# Patient Record
Sex: MALE | Race: WHITE | Employment: UNEMPLOYED | URBAN - METROPOLITAN AREA
[De-identification: names, ages, dates, MRNs, and addresses within clinical notes are randomized per-mention and may not be internally consistent; named-entity substitution may affect disease eponyms.]

---

## 2017-01-01 ENCOUNTER — OFFICE VISIT (OUTPATIENT)
Dept: FAMILY MEDICINE | Facility: CLINIC | Age: 67
End: 2017-01-01
Payer: COMMERCIAL

## 2017-01-01 ENCOUNTER — TELEPHONE (OUTPATIENT)
Dept: FAMILY MEDICINE | Facility: CLINIC | Age: 67
End: 2017-01-01

## 2017-01-01 ENCOUNTER — OFFICE VISIT (OUTPATIENT)
Dept: FAMILY MEDICINE | Facility: CLINIC | Age: 67
End: 2017-01-01
Payer: MEDICARE

## 2017-01-01 ENCOUNTER — OFFICE VISIT (OUTPATIENT)
Dept: FAMILY MEDICINE | Facility: CLINIC | Age: 67
End: 2017-01-01

## 2017-01-01 VITALS
SYSTOLIC BLOOD PRESSURE: 100 MMHG | OXYGEN SATURATION: 95 % | BODY MASS INDEX: 22.96 KG/M2 | RESPIRATION RATE: 16 BRPM | DIASTOLIC BLOOD PRESSURE: 60 MMHG | TEMPERATURE: 98.1 F | WEIGHT: 160 LBS | HEART RATE: 67 BPM

## 2017-01-01 VITALS
TEMPERATURE: 97.3 F | RESPIRATION RATE: 20 BRPM | SYSTOLIC BLOOD PRESSURE: 112 MMHG | DIASTOLIC BLOOD PRESSURE: 66 MMHG | WEIGHT: 162 LBS | OXYGEN SATURATION: 94 % | BODY MASS INDEX: 23.24 KG/M2 | HEART RATE: 63 BPM

## 2017-01-01 VITALS
TEMPERATURE: 97.8 F | WEIGHT: 160 LBS | RESPIRATION RATE: 18 BRPM | SYSTOLIC BLOOD PRESSURE: 112 MMHG | BODY MASS INDEX: 22.9 KG/M2 | HEIGHT: 70 IN | OXYGEN SATURATION: 90 % | HEART RATE: 76 BPM | DIASTOLIC BLOOD PRESSURE: 74 MMHG

## 2017-01-01 VITALS
RESPIRATION RATE: 14 BRPM | TEMPERATURE: 97.4 F | DIASTOLIC BLOOD PRESSURE: 70 MMHG | BODY MASS INDEX: 23.34 KG/M2 | HEIGHT: 70 IN | OXYGEN SATURATION: 98 % | WEIGHT: 163 LBS | HEART RATE: 68 BPM | SYSTOLIC BLOOD PRESSURE: 110 MMHG

## 2017-01-01 VITALS
WEIGHT: 162 LBS | SYSTOLIC BLOOD PRESSURE: 132 MMHG | DIASTOLIC BLOOD PRESSURE: 86 MMHG | RESPIRATION RATE: 16 BRPM | BODY MASS INDEX: 23.24 KG/M2 | OXYGEN SATURATION: 93 % | HEART RATE: 74 BPM | TEMPERATURE: 98.4 F

## 2017-01-01 DIAGNOSIS — Z12.11 SCREEN FOR COLON CANCER: ICD-10-CM

## 2017-01-01 DIAGNOSIS — G47.00 INSOMNIA, UNSPECIFIED TYPE: ICD-10-CM

## 2017-01-01 DIAGNOSIS — R06.2 WHEEZING: ICD-10-CM

## 2017-01-01 DIAGNOSIS — M54.40 CHRONIC MIDLINE LOW BACK PAIN WITH SCIATICA, SCIATICA LATERALITY UNSPECIFIED: ICD-10-CM

## 2017-01-01 DIAGNOSIS — G89.29 CHRONIC BILATERAL LOW BACK PAIN WITH LEFT-SIDED SCIATICA: ICD-10-CM

## 2017-01-01 DIAGNOSIS — F33.1 MODERATE EPISODE OF RECURRENT MAJOR DEPRESSIVE DISORDER (H): ICD-10-CM

## 2017-01-01 DIAGNOSIS — G89.4 CHRONIC PAIN SYNDROME: Primary | ICD-10-CM

## 2017-01-01 DIAGNOSIS — M54.50 CHRONIC MIDLINE LOW BACK PAIN WITHOUT SCIATICA: ICD-10-CM

## 2017-01-01 DIAGNOSIS — J44.1 OBSTRUCTIVE CHRONIC BRONCHITIS WITH EXACERBATION (H): ICD-10-CM

## 2017-01-01 DIAGNOSIS — G47.09 OTHER INSOMNIA: Primary | ICD-10-CM

## 2017-01-01 DIAGNOSIS — G89.29 CHRONIC BILATERAL LOW BACK PAIN WITH LEFT-SIDED SCIATICA: Primary | ICD-10-CM

## 2017-01-01 DIAGNOSIS — Z53.9 ERRONEOUS ENCOUNTER--DISREGARD: Primary | ICD-10-CM

## 2017-01-01 DIAGNOSIS — G89.29 CHRONIC MIDLINE LOW BACK PAIN WITH SCIATICA, SCIATICA LATERALITY UNSPECIFIED: ICD-10-CM

## 2017-01-01 DIAGNOSIS — Z23 NEED FOR VACCINATION: ICD-10-CM

## 2017-01-01 DIAGNOSIS — G89.29 CHRONIC MIDLINE LOW BACK PAIN WITHOUT SCIATICA: Primary | ICD-10-CM

## 2017-01-01 DIAGNOSIS — G89.29 CHRONIC MIDLINE LOW BACK PAIN WITHOUT SCIATICA: ICD-10-CM

## 2017-01-01 DIAGNOSIS — R05.9 COUGH: ICD-10-CM

## 2017-01-01 DIAGNOSIS — M54.42 CHRONIC BILATERAL LOW BACK PAIN WITH LEFT-SIDED SCIATICA: ICD-10-CM

## 2017-01-01 DIAGNOSIS — J41.0 SIMPLE CHRONIC BRONCHITIS (H): ICD-10-CM

## 2017-01-01 DIAGNOSIS — F17.200 TOBACCO USE DISORDER: ICD-10-CM

## 2017-01-01 DIAGNOSIS — Z12.11 SCREEN FOR COLON CANCER: Primary | ICD-10-CM

## 2017-01-01 DIAGNOSIS — Z12.11 ENCOUNTER FOR SCREENING FOR MALIGNANT NEOPLASM OF COLON: ICD-10-CM

## 2017-01-01 DIAGNOSIS — Z23 NEED FOR PROPHYLACTIC VACCINATION AND INOCULATION AGAINST INFLUENZA: ICD-10-CM

## 2017-01-01 DIAGNOSIS — E78.5 HYPERLIPIDEMIA LDL GOAL <100: ICD-10-CM

## 2017-01-01 DIAGNOSIS — G89.4 CHRONIC PAIN SYNDROME: ICD-10-CM

## 2017-01-01 DIAGNOSIS — Z23 NEED FOR PROPHYLACTIC VACCINATION AGAINST STREPTOCOCCUS PNEUMONIAE (PNEUMOCOCCUS): ICD-10-CM

## 2017-01-01 DIAGNOSIS — M54.42 CHRONIC BILATERAL LOW BACK PAIN WITH LEFT-SIDED SCIATICA: Primary | ICD-10-CM

## 2017-01-01 DIAGNOSIS — M51.06 INTERVERTEBRAL LUMBAR DISC DISORDER WITH MYELOPATHY, LUMBAR REGION: ICD-10-CM

## 2017-01-01 DIAGNOSIS — M54.50 CHRONIC MIDLINE LOW BACK PAIN WITHOUT SCIATICA: Primary | ICD-10-CM

## 2017-01-01 LAB
GABAPENTIN SERPLBLD-MCNC: 7.8 UG/ML
PAIN DRUG SCR UR W RPTD MEDS: NORMAL

## 2017-01-01 PROCEDURE — 99213 OFFICE O/P EST LOW 20 MIN: CPT | Mod: 25 | Performed by: FAMILY MEDICINE

## 2017-01-01 PROCEDURE — 99214 OFFICE O/P EST MOD 30 MIN: CPT | Performed by: FAMILY MEDICINE

## 2017-01-01 PROCEDURE — 80307 DRUG TEST PRSMV CHEM ANLYZR: CPT | Mod: 90 | Performed by: FAMILY MEDICINE

## 2017-01-01 PROCEDURE — 90715 TDAP VACCINE 7 YRS/> IM: CPT | Performed by: FAMILY MEDICINE

## 2017-01-01 PROCEDURE — 90670 PCV13 VACCINE IM: CPT | Performed by: FAMILY MEDICINE

## 2017-01-01 PROCEDURE — 80171 DRUG SCREEN QUANT GABAPENTIN: CPT | Mod: 90 | Performed by: FAMILY MEDICINE

## 2017-01-01 PROCEDURE — 99000 SPECIMEN HANDLING OFFICE-LAB: CPT | Performed by: FAMILY MEDICINE

## 2017-01-01 PROCEDURE — 99214 OFFICE O/P EST MOD 30 MIN: CPT | Performed by: INTERNAL MEDICINE

## 2017-01-01 PROCEDURE — G0009 ADMIN PNEUMOCOCCAL VACCINE: HCPCS | Performed by: FAMILY MEDICINE

## 2017-01-01 PROCEDURE — 36415 COLL VENOUS BLD VENIPUNCTURE: CPT | Performed by: FAMILY MEDICINE

## 2017-01-01 PROCEDURE — 90472 IMMUNIZATION ADMIN EACH ADD: CPT | Performed by: FAMILY MEDICINE

## 2017-01-01 RX ORDER — ALBUTEROL SULFATE 90 UG/1
2 AEROSOL, METERED RESPIRATORY (INHALATION) EVERY 6 HOURS
Qty: 1 INHALER | Refills: 5 | Status: SHIPPED | OUTPATIENT
Start: 2017-01-01 | End: 2017-01-01

## 2017-01-01 RX ORDER — CYCLOBENZAPRINE HCL 10 MG
10 TABLET ORAL 3 TIMES DAILY PRN
Qty: 90 TABLET | Refills: 5 | Status: ON HOLD | OUTPATIENT
Start: 2017-01-01 | End: 2018-01-01

## 2017-01-01 RX ORDER — DOXEPIN HYDROCHLORIDE 25 MG/1
25 CAPSULE ORAL AT BEDTIME
Qty: 30 CAPSULE | Refills: 1 | Status: ON HOLD | OUTPATIENT
Start: 2017-01-01 | End: 2018-01-01

## 2017-01-01 RX ORDER — AZITHROMYCIN 500 MG/1
500 TABLET, FILM COATED ORAL DAILY
Qty: 3 TABLET | Refills: 0 | Status: ON HOLD | OUTPATIENT
Start: 2017-01-01 | End: 2018-01-01

## 2017-01-01 RX ORDER — ALBUTEROL SULFATE 90 UG/1
2 AEROSOL, METERED RESPIRATORY (INHALATION) EVERY 6 HOURS
Qty: 1 INHALER | Refills: 5 | Status: SHIPPED | OUTPATIENT
Start: 2017-01-01

## 2017-01-01 RX ORDER — CYCLOBENZAPRINE HCL 10 MG
10 TABLET ORAL 3 TIMES DAILY PRN
Qty: 90 TABLET | Refills: 5 | Status: SHIPPED | OUTPATIENT
Start: 2017-01-01 | End: 2017-01-01

## 2017-01-01 RX ORDER — GABAPENTIN 600 MG/1
TABLET ORAL
Qty: 180 TABLET | Refills: 0 | Status: SHIPPED | OUTPATIENT
Start: 2017-01-01 | End: 2017-01-01

## 2017-01-01 RX ORDER — GABAPENTIN 600 MG/1
TABLET ORAL
Qty: 180 TABLET | Refills: 0 | OUTPATIENT
Start: 2017-01-01

## 2017-01-01 RX ORDER — GABAPENTIN 600 MG/1
TABLET ORAL
Qty: 180 TABLET | Refills: 2 | Status: ON HOLD | OUTPATIENT
Start: 2017-01-01 | End: 2018-01-01

## 2017-01-01 RX ORDER — ZOLPIDEM TARTRATE 5 MG/1
5 TABLET ORAL
Qty: 30 TABLET | Refills: 5 | Status: ON HOLD | OUTPATIENT
Start: 2017-01-01 | End: 2018-01-01

## 2017-01-01 RX ORDER — GABAPENTIN 600 MG/1
TABLET ORAL
Qty: 180 TABLET | Refills: 2 | Status: SHIPPED | OUTPATIENT
Start: 2017-01-01 | End: 2017-01-01

## 2017-01-01 RX ORDER — DULOXETIN HYDROCHLORIDE 30 MG/1
30 CAPSULE, DELAYED RELEASE ORAL 2 TIMES DAILY
Qty: 60 CAPSULE | Refills: 5 | Status: ON HOLD | OUTPATIENT
Start: 2017-01-01 | End: 2018-01-01

## 2017-01-01 ASSESSMENT — PATIENT HEALTH QUESTIONNAIRE - PHQ9
SUM OF ALL RESPONSES TO PHQ QUESTIONS 1-9: 16
SUM OF ALL RESPONSES TO PHQ QUESTIONS 1-9: 10
5. POOR APPETITE OR OVEREATING: SEVERAL DAYS
10. IF YOU CHECKED OFF ANY PROBLEMS, HOW DIFFICULT HAVE THESE PROBLEMS MADE IT FOR YOU TO DO YOUR WORK, TAKE CARE OF THINGS AT HOME, OR GET ALONG WITH OTHER PEOPLE: VERY DIFFICULT
SUM OF ALL RESPONSES TO PHQ QUESTIONS 1-9: 16

## 2017-01-01 ASSESSMENT — ANXIETY QUESTIONNAIRES
6. BECOMING EASILY ANNOYED OR IRRITABLE: SEVERAL DAYS
2. NOT BEING ABLE TO STOP OR CONTROL WORRYING: SEVERAL DAYS
7. FEELING AFRAID AS IF SOMETHING AWFUL MIGHT HAPPEN: SEVERAL DAYS
1. FEELING NERVOUS, ANXIOUS, OR ON EDGE: SEVERAL DAYS
GAD7 TOTAL SCORE: 7
3. WORRYING TOO MUCH ABOUT DIFFERENT THINGS: SEVERAL DAYS
5. BEING SO RESTLESS THAT IT IS HARD TO SIT STILL: SEVERAL DAYS
IF YOU CHECKED OFF ANY PROBLEMS ON THIS QUESTIONNAIRE, HOW DIFFICULT HAVE THESE PROBLEMS MADE IT FOR YOU TO DO YOUR WORK, TAKE CARE OF THINGS AT HOME, OR GET ALONG WITH OTHER PEOPLE: SOMEWHAT DIFFICULT
GAD7 TOTAL SCORE: 7

## 2017-02-22 NOTE — MR AVS SNAPSHOT
After Visit Summary   2/22/2017    Joni Muñoz    MRN: [id number]           Patient Information     Date Of Birth          1950        Visit Information        Provider Department      2/22/2017 7:30 AM Mayur Quinones MD [facility]        Today's Diagnoses     Chronic bilateral low back pain with left-sided sciatica    -  1    Moderate episode of recurrent major depressive disorder (H)        Wheezing        Cough        Tobacco use disorder        Chronic pain syndrome          Care Instructions    Today we added duloxetine (cymbalata) 30 mg twice per day as an antidepressant. This can also help with pain relief.                                                 You should schedule a yearly physical exam with Dr Gonzalez or with me.         Follow-ups after your visit        Additional Services     PHYSICAL THERAPY REFERRAL       Treatment: Evaluation & Treatment  Special Instructions/Modalities: no  Special Programs: low back program    Please be aware that coverage of these services is subject to the terms and limitations of your health insurance plan.  Call member services at your health plan with any benefit or coverage questions.      **Note to Provider:  If you are referring outside of [place] for the therapy appointment, please list the name of the location in the  special instructions  above, print the referral and give to the patient to schedule the appointment.                  Who to contact     If you have questions or need follow up information about today's clinic visit or your schedule please contact [facility] directly at [phone].  Normal or non-critical lab and imaging results will be communicated to you by MyChart, letter or phone within 4 business days after the clinic has received the results. If you do not hear from us within 7 days, please contact the clinic through MyChart or phone. If you have a critical or  "abnormal lab result, we will notify you by phone as soon as possible.  Submit refill requests through [employer] or call your pharmacy and they will forward the refill request to us. Please allow 3 business days for your refill to be completed.          Additional Information About Your Visit        [employer] Information     [employer] lets you send messages to your doctor, view your test results, renew your prescriptions, schedule appointments and more. To sign up, go to [email or web address] . Click on \"Log in\" on the left side of the screen, which will take you to the Welcome page. Then click on \"Sign up Now\" on the right side of the page.     You will be asked to enter the access code listed below, as well as some personal information. Please follow the directions to create your username and password.     Your access code is: [email or web address]  Expires: 3/8/2017 12:01 PM     Your access code will  in 90 days. If you need help or a new code, please call your [place] clinic or [phone].        Care EveryWhere ID     This is your Care EveryWhere ID. This could be used by other organizations to access your [facility] medical records  [id number]        Your Vitals Were     Pulse Temperature Respirations Height Pulse Oximetry BMI (Body Mass Index)    76 97.8  F (36.6  C) 18 5' 10\" (1.778 m) 90% 22.96 kg/m2       Blood Pressure from Last 3 Encounters:   17 112/74   16 130/86   16 116/70    Weight from Last 3 Encounters:   17 160 lb (72.6 kg)   16 167 lb (75.8 kg)   16 167 lb (75.8 kg)              We Performed the Following     PHYSICAL THERAPY REFERRAL          Today's Medication Changes          These changes are accurate as of: 17  8:03 AM.  If you have any questions, ask your nurse or doctor.               Start taking these medicines.        Dose/Directions    DULoxetine 30 MG EC capsule   Commonly known as:  CYMBALTA   Used for:  Moderate episode of recurrent major " depressive disorder (H)   Started by:  Mayur Quinones MD        Dose:  30 mg   Take 1 capsule (30 mg) by mouth 2 times daily   Quantity:  60 capsule   Refills:  5            Where to get your medicines      These medications were sent to [facility] - [place], [place] - [address] AT [address] [place] [address] & [facility]  [address] [place] [address] [address]    Hours:  24-hours Phone:  [phone]     albuterol 108 (90 BASE) MCG/ACT Inhaler    beclomethasone 80 MCG/ACT Inhaler    cyclobenzaprine 10 MG tablet    DULoxetine 30 MG EC capsule                Primary Care Provider Office Phone # Fax #    Kodi Gonzalez [phone]       [facility] [address] [facility] [address]        Thank you!     Thank you for choosing [facility]  for your care. Our goal is always to provide you with excellent care. Hearing back from our patients is one way we can continue to improve our services. Please take a few minutes to complete the written survey that you may receive in the mail after your visit with us. Thank you!             Your Updated Medication List - Protect others around you: Learn how to safely use, store and throw away your medicines at www.disposemymeds.org.          This list is accurate as of: 2/22/17  8:03 AM.  Always use your most recent med list.                   Brand Name Dispense Instructions for use    albuterol 108 (90 BASE) MCG/ACT Inhaler    albuterol    1 Inhaler    Inhale 2 puffs into the lungs every 6 hours       beclomethasone 80 MCG/ACT Inhaler    QVAR    1 Inhaler    Inhale 2 puffs into the lungs 2 times daily       cyclobenzaprine 10 MG tablet    FLEXERIL    90 tablet    Take 1 tablet (10 mg) by mouth 3 times daily as needed for muscle spasms       DULoxetine 30 MG EC capsule    CYMBALTA    60 capsule    Take 1 capsule (30 mg) by mouth 2 times daily       gabapentin 600 MG tablet    NEURONTIN    180 tablet    2 tabs  three times a day

## 2017-02-22 NOTE — PATIENT INSTRUCTIONS
Today we added duloxetine (cymbalata) 30 mg twice per day as an antidepressant. This can also help with pain relief.                                                 You should schedule a yearly physical exam with Dr Gonzalez or with me.

## 2017-02-22 NOTE — NURSING NOTE
"Chief Complaint   Patient presents with     Referral       Initial /74  Pulse 76  Temp 97.8  F (36.6  C)  Resp 18  Ht 5' 10\" (1.778 m)  Wt 160 lb (72.6 kg)  SpO2 90%  BMI 22.96 kg/m2 Estimated body mass index is 22.96 kg/(m^2) as calculated from the following:    Height as of this encounter: 5' 10\" (1.778 m).    Weight as of this encounter: 160 lb (72.6 kg).  Medication Reconciliation: harpreet Platt CMA      "

## 2017-02-22 NOTE — PROGRESS NOTES
"  SUBJECTIVE:                                                    Joni Muñoz is a 66 year old male who presents to clinic today for the following health issues:    Health Maintenance Due   Topic Date Due     LATRICIA QUESTIONNAIRE 1 YEAR  1950     ADVANCE DIRECTIVE PLANNING Q5 YRS (NO INBASKET)  11/06/1969     URINE DRUG SCREEN Q1 YR  07/02/2014     FIT Q1 YR (NO INBASKET)  04/22/2015     INFLUENZA VACCINE (SYSTEM ASSIGNED)  09/01/2016     PNEUMOCOCCAL (1 of 2 - PCV13) 11/06/2016     AORTIC ANEURYSM SCREENING (SYSTEM ASSIGNED)  11/06/2016     PHQ-9 Q6 MONTHS (NO INBASKET)  03/06/2017     Health Maintenance reviewed at today's visit patient asked to schedule/complete:   Colon Cancer:  Patient agrees to schedule  chronic pain        Referral      Duration:     Description (location/character/radiation): pt needs a referral for PT and requesting a MRI if it is time to have one again    Intensity:      Accompanying signs and symptoms:     History (similar episodes/previous evaluation): None    Precipitating or alleviating factors: None    Therapies tried and outcome: None                      Wants PT; he knows where he wants to go, but does not know the name of the clinic.                     Goes to a methadone clinic; he wants to find another pain clinic.        Wants an MRI of his lumber spine; another one; \"they usually want one at a pain clinic\".                          He lives with his sister; she is going home from a NH soon; she needs lots of care.                                                                                                                                   He reports no leg weakness; but has low back and L leg pain.      He reports no real major change in his symptoms since his last MRI in December 2015.                                                                                                   His medication list needs to be updated. He is no longer taking Lexapro. He reports " "it did not help as an antidepressant. He is no longer taking Wellbutrin. He reports having an adverse effect when he took that. He is no longer taking Suboxone. He attends a methadone clinic. He wants refills of his albuterol and Qvar inhalers. He still smokes.        Problem list and histories reviewed & adjusted, as indicated.  Additional history: as documented    Current Outpatient Prescriptions   Medication Sig Dispense Refill     cyclobenzaprine (FLEXERIL) 10 MG tablet Take 1 tablet (10 mg) by mouth 3 times daily as needed for muscle spasms 90 tablet 5     gabapentin (NEURONTIN) 600 MG tablet 2 tabs three times a day 180 tablet 5     albuterol (ALBUTEROL) 108 (90 BASE) MCG/ACT inhaler Inhale 2 puffs into the lungs every 6 hours 1 Inhaler 5     beclomethasone (QVAR) 80 MCG/ACT Inhaler Inhale 2 puffs into the lungs 2 times daily 1 Inhaler 1     No Known Allergies  BP Readings from Last 3 Encounters:   12/14/16 130/86   12/08/16 116/70   09/06/16 118/70    Wt Readings from Last 3 Encounters:   02/22/17 160 lb (72.6 kg)   12/14/16 167 lb (75.8 kg)   12/08/16 167 lb (75.8 kg)                  Problem list, Medication list, Allergies, and Medical/Social/Surgical histories reviewed in EPIC and updated as appropriate.    ROS:  CONSTITUTIONAL:NEGATIVE for fever, chills, change in weight  NEURO: POSITIVE for numbness or tingling  and radicular pain left leg    OBJECTIVE:                                                    Ht 5' 10\" (1.778 m)  Wt 160 lb (72.6 kg)  BMI 22.96 kg/m2  Body mass index is 22.96 kg/(m^2).  GENERAL APPEARANCE: healthy, alert, mild distress and he needs to stand up occasionally to relieve pain  MS: decreased range of motion lumbar spine  NEURO: Normal strength and tone    Diagnostic test results:  none      ASSESSMENT/PLAN:                                                        ICD-10-CM    1. Chronic bilateral low back pain with left-sided sciatica M54.42 cyclobenzaprine (FLEXERIL) 10 MG tablet    " G89.29 PHYSICAL THERAPY REFERRAL   2. Moderate episode of recurrent major depressive disorder (H) F33.1 DULoxetine (CYMBALTA) 30 MG EC capsule   3. Wheezing R06.2 albuterol (ALBUTEROL) 108 (90 BASE) MCG/ACT Inhaler   4. Cough R05 beclomethasone (QVAR) 80 MCG/ACT Inhaler    antibiotic/ cough syrup / inhalers    5. Tobacco use disorder F17.200    6. Chronic pain syndrome G89.4        We reviewed several issues today. He wants to have another course of physical therapy and I gave him an order for that.            For now he will continue with the methadone clinic.            He reports planning to look for another pain clinic. I renewed his albuterol and Qvar.             Patient Instructions   Today we added duloxetine (cymbalata) 30 mg twice per day as an antidepressant. This can also help with pain relief.                                                 You should schedule a yearly physical exam with Dr Gonzalez or with me.       Mayur Quinones MD  [facility]

## 2017-05-22 NOTE — TELEPHONE ENCOUNTER
Controlled Substance Refill Request for Gabapentin  Problem List Complete:  No     PROVIDER TO CONSIDER COMPLETION OF PROBLEM LIST AND OVERVIEW/CONTROLLED SUBSTANCE AGREEMENT    Last Written Prescription Date:  12-8-16  Last Fill Quantity: 180,   # refills: 5    Last Office Visit with [facility] primary care provider: 2-22-17    Future Office visit:     Controlled substance agreement on file: No.     Processing:  Fax Rx to [facility] pharmacy   checked in past 6 months?  Yes 5-22-17 with no concerns

## 2017-05-23 NOTE — TELEPHONE ENCOUNTER
Gabapentin 600 mg      Last Written Prescription Date:  5/23/17  Last Fill Quantity: 180,   # refills: 0  Last Office Visit with [facility] prescribing provider: 2/22/17  Future Office visit:       Routing refill request to provider for review/approval because:  Drug not on the [facility] refill protocol or controlled substance

## 2017-05-24 NOTE — TELEPHONE ENCOUNTER
Controlled Substance Refill Request for Gabapentin  Problem List Complete:  No     PROVIDER TO CONSIDER COMPLETION OF PROBLEM LIST AND OVERVIEW/CONTROLLED SUBSTANCE AGREEMENT    Controlled substance agreement on file: No.     Processing:  Fax Rx to [facility] pharmacy   checked in past 6 months?  Yes 5-22-17

## 2017-06-28 NOTE — PROGRESS NOTES
SUBJECTIVE:                                                    Joni Muñoz is a 66 year old male who presents to clinic today for the following health issues:      Medication Followup of Gabapentin and flexeril    Taking Medication as prescribed: yes    Side Effects:  None    Medication Helping Symptoms:  yes     Here fro refill on medications he is going to methadone lcinic on [address] in [facility]. States now out of gabapentin and flexeril.   PROBLEMS TO ADD ON...    Problem list and histories reviewed & adjusted, as indicated.  Additional history: as documented    Patient Active Problem List   Diagnosis     Sprain of great toe     Chronic pain syndrome     Disorder of bone and cartilage     Moderate episode of recurrent major depressive disorder (H)     Tobacco use disorder     Backache     Degeneration of lumbar or lumbosacral intervertebral disc     Intervertebral lumbar disc disorder with myelopathy, lumbar region     Facet arthritis of lumbar region (H)     Chronic low back pain     Sacroiliac dysfunction     Lumbago     Hyperlipidemia LDL goal <130     Cocaine abuse     Shoulder pain, right     Past Surgical History:   Procedure Laterality Date     BACK SURGERY       HERNIA REPAIR  1969     ORTHOPEDIC SURGERY      L arm        Social History   Substance Use Topics     Smoking status: Current Every Day Smoker     Packs/day: 0.50     Years: 30.00     Types: Cigarettes     Smokeless tobacco: Never Used     Alcohol use No     Family History   Problem Relation Age of Onset     CEREBROVASCULAR DISEASE Father            Reviewed and updated as needed this visit by clinical staff  Allergies       Reviewed and updated as needed this visit by Provider         ROS:  CONSTITUTIONAL:NEGATIVE for fever, chills, change in weight  INTEGUMENTARY/SKIN: NEGATIVE for worrisome rashes, moles or lesions  RESP:NEGATIVE for significant cough or SOB  CV: NEGATIVE for chest pain, palpitations or peripheral  edema  MUSCULOSKELETAL: chronic back pain   Psych-    Stressed as sister is ill and had had abscess on buttocks area. He is caring for her. States is stressed as she has been very ill and he is going to be caring for her including needs to lear to pack her abscess.     OBJECTIVE:                                                    /60  Pulse 67  Temp 98.1  F (36.7  C)  Resp 16  Wt 160 lb (72.6 kg)  SpO2 95%  BMI 22.96 kg/m2  Body mass index is 22.96 kg/(m^2).  GENERAL APPEARANCE: healthy, alert and mild distress  SKIN: no suspicious lesions or rashes  NEURO: Normal strength and tone, mentation intact and speech normal  Back exam  Not repeated.   Diagnostic test results:  Diagnostic Test Results:  none      ASSESSMENT/PLAN:                                                    1. Chronic midline low back pain with sciatica, sciatica laterality unspecified    - Fecal colorectal cancer screen FIT - Future (S+30); Future  - gabapentin (NEURONTIN) 600 MG tablet; TAKE 2 TABLETS BY MOUTH THREE TIMES DAILY  Dispense: 180 tablet; Refill: 2    2. Screen for colon cancer      3. Need for prophylactic vaccination against Streptococcus pneumoniae (pneumococcus)    - Pneumococcal vaccine 13 valent PCV13 IM (Prevnar) [41955]  - ADMIN: Vaccine, Initial (55832)    4. Chronic bilateral low back pain with left-sided sciatica    - cyclobenzaprine (FLEXERIL) 10 MG tablet; Take 1 tablet (10 mg) by mouth 3 times daily as needed for muscle spasms  Dispense: 90 tablet; Refill: 5    5. Encounter for screening for malignant neoplasm of colon     - Fecal colorectal cancer screen FIT - Future (S+30); Future    6. Other insomnia    - zolpidem (AMBIEN) 5 MG tablet; Take 1 tablet (5 mg) by mouth nightly as needed for sleep  Dispense: 30 tablet; Refill: 5    7. Need for vaccination    - TDAP VACCINE (ADACEL) [49297.002]  - Each additional admin.  (Right click and add QUANTITY)  [82352]    Discussed caution with use of multiple medications  discussed ambien is at low dose if not helpful we could discussss increase. Is currently on methadone 120 mg a day.   Follow up with Provider - 2-4 weeks or as needed.      Kodi Gonzalez MD  [facility]

## 2017-06-28 NOTE — NURSING NOTE
"Chief Complaint   Patient presents with     Recheck Medication       Initial /60  Pulse 67  Temp 98.1  F (36.7  C)  Resp 16  Wt 160 lb (72.6 kg)  SpO2 95%  BMI 22.96 kg/m2 Estimated body mass index is 22.96 kg/(m^2) as calculated from the following:    Height as of 2/22/17: 5' 10\" (1.778 m).    Weight as of this encounter: 160 lb (72.6 kg).  Medication Reconciliation: harpreet Platt CMA      "

## 2017-06-28 NOTE — MR AVS SNAPSHOT
After Visit Summary   6/28/2017    Joni Muñoz    MRN: [id number]           Patient Information     Date Of Birth          1950        Visit Information        Provider Department      6/28/2017 8:30 AM Kodi Gonzalez MD [facility]        Today's Diagnoses     Other insomnia    -  1    Chronic midline low back pain with sciatica, sciatica laterality unspecified        Screen for colon cancer        Need for prophylactic vaccination against Streptococcus pneumoniae (pneumococcus)        Chronic bilateral low back pain with left-sided sciatica        Encounter for screening for malignant neoplasm of colon         Need for vaccination           Follow-ups after your visit        Future tests that were ordered for you today     Open Future Orders        Priority Expected Expires Ordered    Fecal colorectal cancer screen FIT - Future (S+30) Routine 7/19/2017 7/28/2017 6/28/2017            Who to contact     If you have questions or need follow up information about today's clinic visit or your schedule please contact [facility] directly at [phone].  Normal or non-critical lab and imaging results will be communicated to you by Saiseihart, letter or phone within 4 business days after the clinic has received the results. If you do not hear from us within 7 days, please contact the clinic through IncreaseCardt or phone. If you have a critical or abnormal lab result, we will notify you by phone as soon as possible.  Submit refill requests through [employer] or call your pharmacy and they will forward the refill request to us. Please allow 3 business days for your refill to be completed.          Additional Information About Your Visit        Saiseihart Information     [employer] lets you send messages to your doctor, view your test results, renew your prescriptions, schedule appointments and more. To sign up, go to [email or web address] . Click on  "\"Log in\" on the left side of the screen, which will take you to the Welcome page. Then click on \"Sign up Now\" on the right side of the page.     You will be asked to enter the access code listed below, as well as some personal information. Please follow the directions to create your username and password.     Your access code is: [email or web address]  Expires: 2017 10:51 AM     Your access code will  in 90 days. If you need help or a new code, please call your [place] clinic or [phone].        Care EveryWhere ID     This is your Care EveryWhere ID. This could be used by other organizations to access your [place] medical records  [id number]        Your Vitals Were     Pulse Temperature Respirations Pulse Oximetry BMI (Body Mass Index)       67 98.1  F (36.7  C) 16 95% 22.96 kg/m2        Blood Pressure from Last 3 Encounters:   17 100/60   17 112/74   16 130/86    Weight from Last 3 Encounters:   17 160 lb (72.6 kg)   17 160 lb (72.6 kg)   16 167 lb (75.8 kg)              We Performed the Following     ADMIN: Vaccine, Initial (71548)     Each additional admin.  (Right click and add QUANTITY)  [86719]     Pneumococcal vaccine 13 valent PCV13 IM (Prevnar) [53533]     TDAP VACCINE (ADACEL) [19782.002]          Today's Medication Changes          These changes are accurate as of: 17 11:18 AM.  If you have any questions, ask your nurse or doctor.               Start taking these medicines.        Dose/Directions    cyclobenzaprine 10 MG tablet   Commonly known as:  FLEXERIL   Used for:  Chronic bilateral low back pain with left-sided sciatica   Started by:  Kodi Gonzalez MD        Dose:  10 mg   Take 1 tablet (10 mg) by mouth 3 times daily as needed for muscle spasms   Quantity:  90 tablet   Refills:  5       zolpidem 5 MG tablet   Commonly known as:  AMBIEN   Used for:  Other insomnia   Started by:  Kodi Gonzalez MD        Dose:  5 mg   Take 1 tablet (5 " mg) by mouth nightly as needed for sleep   Quantity:  30 tablet   Refills:  5         These medicines have changed or have updated prescriptions.        Dose/Directions    gabapentin 600 MG tablet   Commonly known as:  NEURONTIN   This may have changed:  See the new instructions.   Used for:  Chronic midline low back pain with sciatica, sciatica laterality unspecified   Changed by:  Kodi Gonzalez MD        TAKE 2 TABLETS BY MOUTH THREE TIMES DAILY   Quantity:  180 tablet   Refills:  2            Where to get your medicines      These medications were sent to [facility] [address] [place] [address] [address] AT [address] [place] [address] & [facility]  [address] [place], [place] [address] [id number]    Hours:  24-hours Phone:  [phone]     gabapentin 600 MG tablet         Some of these will need a paper prescription and others can be bought over the counter.  Ask your nurse if you have questions.     Bring a paper prescription for each of these medications     cyclobenzaprine 10 MG tablet    zolpidem 5 MG tablet                Primary Care Provider Office Phone # Fax #    Kodi Gonzalez [phone]       [facility] [address] [facility] [address]        Equal Access to Services     RICKY BENTON AH: Hadii aad ku hadasho Soomaali, waaxda luqadaha, qaybta kaalmada adeegyada, waxay idiin hayaan kristen khanca romano. So [facility] [phone].    ATENCIÓN: Si habla español, tiene a beckett disposición servicios gratuitos de asistencia lingüística. Rozina al [phone].    We comply with applicable federal civil rights laws and [place] laws. We do not discriminate on the basis of race, color, national origin, age, disability sex, sexual orientation or gender identity.            Thank you!     Thank you for choosing [facility]  for your care. Our goal is always to provide you with excellent care. Hearing back from our patients is one way we can continue to  improve our services. Please take a few minutes to complete the written survey that you may receive in the mail after your visit with us. Thank you!             Your Updated Medication List - Protect others around you: Learn how to safely use, store and throw away your medicines at www.disposemymeds.org.          This list is accurate as of: 6/28/17 11:18 AM.  Always use your most recent med list.                   Brand Name Dispense Instructions for use Diagnosis    albuterol 108 (90 BASE) MCG/ACT Inhaler    albuterol    1 Inhaler    Inhale 2 puffs into the lungs every 6 hours    Wheezing       beclomethasone 80 MCG/ACT Inhaler    QVAR    1 Inhaler    Inhale 2 puffs into the lungs 2 times daily    Cough       cyclobenzaprine 10 MG tablet    FLEXERIL    90 tablet    Take 1 tablet (10 mg) by mouth 3 times daily as needed for muscle spasms    Chronic bilateral low back pain with left-sided sciatica       DULoxetine 30 MG EC capsule    CYMBALTA    60 capsule    Take 1 capsule (30 mg) by mouth 2 times daily    Moderate episode of recurrent major depressive disorder (H)       gabapentin 600 MG tablet    NEURONTIN    180 tablet    TAKE 2 TABLETS BY MOUTH THREE TIMES DAILY    Chronic midline low back pain with sciatica, sciatica laterality unspecified       zolpidem 5 MG tablet    AMBIEN    30 tablet    Take 1 tablet (5 mg) by mouth nightly as needed for sleep    Other insomnia

## 2017-07-12 NOTE — PROGRESS NOTES
SUBJECTIVE:                                                    Joni Muñoz is a 66 year old male who presents to clinic today for the following health issues:      Insomnia      Duration: ongoing    Description  Frequency of insomnia:  nightly  Time to fall asleep: hours  Middle of night awakening:  nightly  Early morning awakening:  nightly    Accompanying signs and symptoms:  pain and stress    History  Similar episodes in past:  YES  Previous evaluation/sleep study:  no     Precipitating or alleviating factors:  New stressful situation: YES  Caffeine intake after lunchtime: no   OTC decongestants: no   Any new medications: no     Therapies tried and outcome: pt was prescribed a new medication for insomnia and its not helping    Musculoskeletal problem/pain      Duration: ongoing but pt says it is getting worse    Description  Location: back pain    Intensity:  moderate    Accompanying signs and symptoms: none    History  Previous similar problem: YES  Previous evaluation:  MRI and orthopedic evaluation    Precipitating or alleviating factors:  Trauma or overuse: no   Aggravating factors include: sitting, standing and walking    Therapies tried and outcome: rest/inactivity      PROBLEMS TO ADD ON...    Problem list and histories reviewed & adjusted, as indicated.  Additional history: as documented    Patient Active Problem List   Diagnosis     Sprain of great toe     Chronic pain syndrome     Disorder of bone and cartilage     Moderate episode of recurrent major depressive disorder (H)     Tobacco use disorder     Backache     Degeneration of lumbar or lumbosacral intervertebral disc     Intervertebral lumbar disc disorder with myelopathy, lumbar region     Facet arthritis of lumbar region (H)     Chronic low back pain     Sacroiliac dysfunction     Lumbago     Hyperlipidemia LDL goal <130     Cocaine abuse     Shoulder pain, right     Past Surgical History:   Procedure Laterality Date     BACK SURGERY        HERNIA REPAIR  1969     ORTHOPEDIC SURGERY      L arm        Social History   Substance Use Topics     Smoking status: Current Every Day Smoker     Packs/day: 0.50     Years: 30.00     Types: Cigarettes     Smokeless tobacco: Never Used     Alcohol use No     Family History   Problem Relation Age of Onset     CEREBROVASCULAR DISEASE Father          Here for disucssio of pills and insomnia. Not being seen at a pain clinic but is in [facility] and this is providing some relief of the back pain he has had surgery prevously for back,   Reviewed and updated as needed this visit by clinical staff       Reviewed and updated as needed this visit by Provider         ROS:  CONSTITUTIONAL:NEGATIVE for fever, chills, change in weight  INTEGUMENTARY/SKIN: NEGATIVE for worrisome rashes, moles or lesions  MUSCULOSKELETAL: previous back surgery. The neurontin helps for back pain is on methadone.  and back pain  NEURO: NEGATIVE for weakness, dizziness or paresthesias  PSYCHIATRIC: NEGATIVE for changes in mood or affect and stressed as sister is very ill and stressed about long term course of his back pain.     OBJECTIVE:                                                    /86  Pulse 74  Temp 98.4  F (36.9  C)  Resp 16  Wt 162 lb (73.5 kg)  SpO2 93%  BMI 23.24 kg/m2  Body mass index is 23.24 kg/(m^2).  GENERAL APPEARANCE: healthy, alert and moderate distress  EYES: Eyes grossly normal to inspection, PERRL and conjunctivae and sclerae normal  RESP: lungs clear to auscultation - no rales, rhonchi or wheezes  CV: regular rates and rhythm, normal S1 S2, no S3 or S4 and no murmur, click or rub  MS: extremities normal- no gross deformities noted and decreased range of motion of back  SKIN: no suspicious lesions or rashes    Diagnostic test results:  Diagnostic Test Results:  none      ASSESSMENT/PLAN:                                                    1. Screen for colon cancer    - Fecal colorectal cancer screen FIT -  Future (S+30); Future    2. Chronic midline low back pain without sciatica    - PHYSICAL THERAPY REFERRAL  - Gabapentin level  - Drug  Screen Comprehensive , Urine with Reported Meds (MedTox) (Pain Care Package)    3. Insomnia, unspecified type    - doxepin (SINEQUAN) 25 MG capsule; Take 1 capsule (25 mg) by mouth At Bedtime  Dispense: 30 capsule; Refill: 1      He would like to try physicla therapy discssed cautious us eof the sinequan.   Follow up with Provider - 2-6 weeks or as needed.      Kodi Gonzalez MD  [facility]

## 2017-07-12 NOTE — NURSING NOTE
"Chief Complaint   Patient presents with     Back Pain     referral for PT     Insomnia       Initial /86  Pulse 74  Temp 98.4  F (36.9  C)  Resp 16  Wt 162 lb (73.5 kg)  SpO2 93%  BMI 23.24 kg/m2 Estimated body mass index is 23.24 kg/(m^2) as calculated from the following:    Height as of 2/22/17: 5' 10\" (1.778 m).    Weight as of this encounter: 162 lb (73.5 kg).  Medication Reconciliation: harpreet Platt CMA      "

## 2017-07-12 NOTE — MR AVS SNAPSHOT
"              After Visit Summary   7/12/2017    Joni Muñoz    MRN: [id number]           Patient Information     Date Of Birth          1950        Visit Information        Provider Department      7/12/2017 3:45 PM Kodi Gonzalez MD [facility]        Today's Diagnoses     Screen for colon cancer    -  1    Chronic midline low back pain without sciatica        Insomnia, unspecified type           Follow-ups after your visit        Additional Services     PHYSICAL THERAPY REFERRAL       *This therapy referral will be filtered to a centralized scheduling office at [facility] and the patient will receive a call to schedule an appointment at a [facility] location most convenient for them. *     [facility] provides Physical Therapy evaluation and treatment and many specialty services across the [facility] system.  If requesting a specialty program, please choose from the list below.    If you have not heard from the scheduling office within 2 business days, please call [phone] for all locations, with the exception of [place], please call [phone].  Treatment: Evaluation & Treatment  Special Instructions/Modalitiesnone  Special Programs: None  EVALUATE AND TREAT    Please be aware that coverage of these services is subject to the terms and limitations of your health insurance plan.  Call member services at your health plan with any benefit or coverage questions.      **Note to Provider:  If you are referring outside of [facility] for the therapy appointment, please list the name of the location in the \"special instructions\" above, print the referral and give to the patient to schedule the appointment.                  Future tests that were ordered for you today     Open Future Orders        Priority Expected Expires Ordered    Fecal colorectal cancer screen FIT - Future (S+30) Routine 8/2/2017 8/11/2017 7/12/2017            Who " "to contact     If you have questions or need follow up information about today's clinic visit or your schedule please contact [facility] directly at [phone].  Normal or non-critical lab and imaging results will be communicated to you by MyChart, letter or phone within 4 business days after the clinic has received the results. If you do not hear from us within 7 days, please contact the clinic through MyChart or phone. If you have a critical or abnormal lab result, we will notify you by phone as soon as possible.  Submit refill requests through [employer] or call your pharmacy and they will forward the refill request to us. Please allow 3 business days for your refill to be completed.          Additional Information About Your Visit        [facility] [facility] Information     [employer] lets you send messages to your doctor, view your test results, renew your prescriptions, schedule appointments and more. To sign up, go to [email or web address] . Click on \"Log in\" on the left side of the screen, which will take you to the Welcome page. Then click on \"Sign up Now\" on the right side of the page.     You will be asked to enter the access code listed below, as well as some personal information. Please follow the directions to create your username and password.     Your access code is: [email or web address]  Expires: 2017 10:51 AM     Your access code will  in 90 days. If you need help or a new code, please call your [place] clinic or [phone].        Care EveryWhere ID     This is your Care EveryWhere ID. This could be used by other organizations to access your [place] medical records  [id number]        Your Vitals Were     Pulse Temperature Respirations Pulse Oximetry BMI (Body Mass Index)       74 98.4  F (36.9  C) 16 93% 23.24 kg/m2        Blood Pressure from Last 3 Encounters:   17 132/86   17 100/60   17 112/74    Weight from Last 3 Encounters:   17 162 lb (73.5 " kg)   06/28/17 160 lb (72.6 kg)   02/22/17 160 lb (72.6 kg)              We Performed the Following     Drug  Screen Comprehensive , Urine with Reported Meds (MedTox) (Pain Care Package)     Gabapentin level     PHYSICAL THERAPY REFERRAL          Today's Medication Changes          These changes are accurate as of: 7/12/17  4:54 PM.  If you have any questions, ask your nurse or doctor.               Start taking these medicines.        Dose/Directions    doxepin 25 MG capsule   Commonly known as:  SINEquan   Used for:  Insomnia, unspecified type   Started by:  Kodi Gonzalez MD        Dose:  25 mg   Take 1 capsule (25 mg) by mouth At Bedtime   Quantity:  30 capsule   Refills:  1            Where to get your medicines      These medications were sent to [facility] [address] [place] [address] [address] AT [address] [place] [address] & [facility]  [address] [place] [id number]    Hours:  24-hours Phone:  [phone]     doxepin 25 MG capsule                Primary Care Provider Office Phone # Fax #    Kodi Gonzalez [phone]       [facility] [address] [facility] [address]        Equal Access to Services     JOSH BENTON AH: Hadii aad ku hadasho Soomaali, waaxda luqadaha, qaybta kaalmada adeegyada, waxay idiin hayaan kristen kharaadalid romano. So [facility] [phone].    ATENCIÓN: Si habla español, tiene a beckett disposición servicios gratuitos de asistencia lingüística. Llame al [phone].    We comply with applicable federal civil rights laws and [place] laws. We do not discriminate on the basis of race, color, national origin, age, disability sex, sexual orientation or gender identity.            Thank you!     Thank you for choosing [facility]  for your care. Our goal is always to provide you with excellent care. Hearing back from our patients is one way we can continue to improve our services. Please take a few minutes to complete the  written survey that you may receive in the mail after your visit with us. Thank you!             Your Updated Medication List - Protect others around you: Learn how to safely use, store and throw away your medicines at www.[employer].org.          This list is accurate as of: 7/12/17  4:54 PM.  Always use your most recent med list.                   Brand Name Dispense Instructions for use Diagnosis    albuterol 108 (90 BASE) MCG/ACT Inhaler    albuterol    1 Inhaler    Inhale 2 puffs into the lungs every 6 hours    Wheezing       beclomethasone 80 MCG/ACT Inhaler    QVAR    1 Inhaler    Inhale 2 puffs into the lungs 2 times daily    Cough       cyclobenzaprine 10 MG tablet    FLEXERIL    90 tablet    Take 1 tablet (10 mg) by mouth 3 times daily as needed for muscle spasms    Chronic bilateral low back pain with left-sided sciatica       doxepin 25 MG capsule    SINEquan    30 capsule    Take 1 capsule (25 mg) by mouth At Bedtime    Insomnia, unspecified type       DULoxetine 30 MG EC capsule    CYMBALTA    60 capsule    Take 1 capsule (30 mg) by mouth 2 times daily    Moderate episode of recurrent major depressive disorder (H)       gabapentin 600 MG tablet    NEURONTIN    180 tablet    TAKE 2 TABLETS BY MOUTH THREE TIMES DAILY    Chronic midline low back pain with sciatica, sciatica laterality unspecified       zolpidem 5 MG tablet    AMBIEN    30 tablet    Take 1 tablet (5 mg) by mouth nightly as needed for sleep    Other insomnia

## 2017-07-12 NOTE — LETTER
August 22, 2017      Joni Muñoz  [address]  [facility] [place] [address]        Dear ,    We are writing to inform you of your test results.    Result was abnormal cocaine was present in the urine test.     Resulted Orders   Gabapentin level   Result Value Ref [place]    Gabapentin Level 7.8       Comment:      Analysis performed by [employer] [place] [address]  Reference range: 4.0  to  16.0  Unit: ug/mL     Drug  Screen Comprehensive , Urine with Reported Meds (MedTox) (Pain Care Package)   Result Value Ref [place]    Pain Drug SCR UR W RPTD Meds       FINAL  (Note)  ====================================================================  [facility] COMP DRUG ANALYSIS,UR  ====================================================================  Test                             Result       Flag       Units    Drug Present   Cocaine                        >3571                   ng/mg creat   Benzoylecgonine                >3571                   ng/mg creat    Source of cocaine is most commonly illicit, but cocaine is    present in some topical anesthetic solutions. Benzoylecgonine is    an expected metabolite of cocaine.     Methadone                      >7143                   ng/mg creat   EDDP (Methadone Mtb)           >7143                   ng/mg creat    Sources of methadone include scheduled prescription medications.    EDDP is an expected metabolite of methadone.     Gabapentin                     PRESENT   Cyclobenzaprine                PRESENT   Ibuprofen                      PRESENT  ============================================= =======================  Test                      Result    Flag   Units      Ref [place]   Creatinine              140              mg/dL      >=20  ====================================================================  Declared Medications:  Medication list was not provided.  ====================================================================  For clinical  consultation, please call [phone].  ====================================================================  Analysis performed by [email or web address] [place] [address]         If you have any questions or concerns, please call the clinic at the number listed above.       Sincerely,        Kodi Gonzalez MD

## 2017-07-28 NOTE — TELEPHONE ENCOUNTER
Patient is requesting new PT order for [facility] would like order faxed to [phone] the [facility].  Order pended. Please approve if agree with order and send message to patient care team or triage to fax.

## 2017-08-22 NOTE — PROGRESS NOTES
Result was abnormal cocaine was present in the urine test. .      Please let patient know by calling or sending a letter.

## 2017-09-13 NOTE — TELEPHONE ENCOUNTER
gabapentin (NEURONTIN) 600 MG tablet      Last Written Prescription Date:  6/28/17  Last Fill Quantity: 180,   # refills: 2  Last Office Visit with [facility] [place], [facility] or [facility] prescribing provider: 7/12/17  Future Office visit:       Routing refill request to provider for review/approval because:  Drug not on the [facility] [place], [facility] or [facility] refill protocol or controlled substance

## 2017-09-13 NOTE — TELEPHONE ENCOUNTER
Controlled Substance Refill Request for Gabapentin  Problem List Complete:  No     PROVIDER TO CONSIDER COMPLETION OF PROBLEM LIST AND OVERVIEW/CONTROLLED SUBSTANCE AGREEMENT      Future Office visit:     Controlled substance agreement on file: No.     Processing:  Fax Rx to [facility] pharmacy     checked in past 6 months?  Yes 5-22-17 with no concerns

## 2017-09-21 NOTE — MR AVS SNAPSHOT
After Visit Summary   9/21/2017    Joni Muñoz    MRN: [id number]           Patient Information     Date Of Birth          1950        Visit Information        Provider Department      9/21/2017 10:40 AM Roselyn Luis MD [facility]        Today's Diagnoses     ERRONEOUS ENCOUNTER--DISREGARD    -  1    Screen for colon cancer        Tobacco use disorder        Need for prophylactic vaccination and inoculation against influenza          Care Instructions    1. Consider LIfe Line Screening which does a state of the art ultra sound of the carotid or neck arteries for stroke risk detection, of the abdomen to detect aneurysm, and the legs to find arterial stenosis or sclerosis and is cheap.  The advertisement usually comes in the mail and they are in a school or [employer] near you or you can call [phone]    2. No difference was  Noted by patients in a double blind study when given codeine, tylenol ( acetaminophen) or ibuprofen (all in identical pills). They felt no difference in pain relief. Since ibuprofen and the NSAIDs  causes kidney damage, esophageal damage with heartburn, and can increase the risk of esophageal and stomach cancer and ulcers,and colonic strictures. They also cause increased risk of heart attack .   I recommend that you use tylenol(acetaminophen) for pain. Use the acetaminophen ES  Which has 500mgm/tablet You can take up to 2 tablets 4 times a day as need for pain.  If this is not enough, you can add in ibuprofen or aleve(naprosyn) with 2 glasses of fluid and some food-to protect the stomach and esophagus. Please let us know if you are continuing to take ibuprofen or aleve, as we will need to periodically check your kidney function with a blood test.            Follow-ups after your visit        Who to contact     If you have questions or need follow up information about today's clinic visit or your schedule please contact [place]  [facility] directly at [phone].  Normal or non-critical lab and imaging results will be communicated to you by MyChart, letter or phone within 4 business days after the clinic has received the results. If you do not hear from us within 7 days, please contact the clinic through A[facility]ark[facility] or phone. If you have a critical or abnormal lab result, we will notify you by phone as soon as possible.  Submit refill requests through [facility] or call your pharmacy and they will forward the refill request to us. Please allow 3 business days for your refill to be completed.          Additional Information About Your Visit        AardvarkharInside Information     K[employer]cker lets you send messages to your doctor, view your test results, renew your prescriptions, schedule appointments and more. To sign up, go to [email or web address] . Click on \"Log in\" on the left side of the screen, which will take you to the Welcome page. Then click on \"Sign up Now\" on the right side of the page.     You will be asked to enter the access code listed below, as well as some personal information. Please follow the directions to create your username and password.     Your access code is: [email or web address]  Expires: 2017 10:14 AM     Your access code will  in 90 days. If you need help or a new code, please call your [place] clinic or [phone].        Care EveryWhere ID     This is your Care EveryWhere ID. This could be used by other organizations to access your [place] medical records  [id number]        Your Vitals Were     Pulse Temperature Respirations Height Pulse Oximetry BMI (Body Mass Index)    68 97.4  F (36.3  C) (Tympanic) 14 5' 10\" (1.778 m) 98% 23.39 kg/m2       Blood Pressure from Last 3 Encounters:   17 112/66   17 110/70   17 132/86    Weight from Last 3 Encounters:   17 162 lb (73.5 kg)   17 163 lb (73.9 kg)   17 162 lb (73.5 kg)              Today, you had the following     " No orders found for display       Primary Care Provider Office Phone # Fax #    Kodi Gonzalez [phone]       [address] [facility] [address]        Equal Access to Services     JOSH BENTON : Hadii aad ku hadkristeno Soomaali, waaxda luqadaha, qaybta kaalmada adeegyada, waxay idiin haymacrinan adeankita [place] lajax romano. So [facility] [phone].    ATENCIÓN: Si habla español, tiene a beckett disposición servicios gratuitos de asistencia lingüística. Llame al [phone].    We comply with applicable federal civil rights laws and [place] laws. We do not discriminate on the basis of race, color, national origin, age, disability, sex, sexual orientation, or gender identity.            Thank you!     Thank you for choosing [facility]  for your care. Our goal is always to provide you with excellent care. Hearing back from our patients is one way we can continue to improve our services. Please take a few minutes to complete the written survey that you may receive in the mail after your visit with us. Thank you!             Your Updated Medication List - Protect others around you: Learn how to safely use, store and throw away your medicines at www.disposemymeds.org.          This list is accurate as of: 9/21/17 11:59 PM.  Always use your most recent med list.                   Brand Name Dispense Instructions for use Diagnosis    beclomethasone 80 MCG/ACT Inhaler    QVAR    1 Inhaler    Inhale 2 puffs into the lungs 2 times daily    Cough       doxepin 25 MG capsule    SINEquan    30 capsule    Take 1 capsule (25 mg) by mouth At Bedtime    Insomnia, unspecified type       DULoxetine 30 MG EC capsule    CYMBALTA    60 capsule    Take 1 capsule (30 mg) by mouth 2 times daily    Moderate episode of recurrent major depressive disorder (H)       zolpidem 5 MG tablet    AMBIEN    30 tablet    Take 1 tablet (5 mg) by mouth nightly as needed for sleep    Other insomnia

## 2017-09-21 NOTE — NURSING NOTE
"Chief Complaint   Patient presents with     Establish Care     Recheck Medication     /70  Pulse 68  Temp 97.4  F (36.3  C) (Tympanic)  Resp 14  Ht 5' 10\" (1.778 m)  Wt 163 lb (73.9 kg)  SpO2 98%  BMI 23.39 kg/m2 Estimated body mass index is 23.39 kg/(m^2) as calculated from the following:    Height as of this encounter: 5' 10\" (1.778 m).    Weight as of this encounter: 163 lb (73.9 kg).  BP completed using cuff size: regular   Teressa Garcia CMA    Health Maintenance Due   Topic Date Due     TOBACCO CESSATION COUNSELING Q1 YR  1950     ADVANCE DIRECTIVE PLANNING Q5 YRS  11/06/2006     LIPID MONITORING Q1 YEAR  04/16/2014     FIT Q1 YR  04/22/2015     AORTIC ANEURYSM SCREENING (SYSTEM ASSIGNED)  11/06/2016     INFLUENZA VACCINE (SYSTEM ASSIGNED)  09/01/2017     Health Maintenance reviewed at today's visit patient asked to schedule/complete:   Immunizations:  Patient agrees to schedule    "

## 2017-09-21 NOTE — LETTER
My Depression Action Plan  Name: Joni Muñoz   Date of Birth 1950  Date: 9/21/2017    My doctor: Kodi Gonzalez   My clinic: [address] [address]  [phone]          GREEN    ZONE   Good Control    What it looks like:     Things are going generally well. You have normal up s and down s. You may even feel depressed from time to time, but bad moods usually last less than a day.   What you need to do:  1. Continue to care for yourself (see self care plan)  2. Check your depression survival kit and update it as needed  3. Follow your physician s recommendations including any medication.  4. Do not stop taking medication unless you consult with your physician first.           YELLOW         ZONE Getting Worse    What it looks like:     Depression is starting to interfere with your life.     It may be hard to get out of bed; you may be starting to isolate yourself from others.    Symptoms of depression are starting to last most all day and this has happened for several days.     You may have suicidal thoughts but they are not constant.   What you need to do:     1. Call your care team, your response to treatment will improve if you keep your care team informed of your progress. Yellow periods are signs an adjustment may need to be made.     2. Continue your self-care, even if you have to fake it!    3. Talk to someone in your support network    4. Open up your depression survival kit           RED    ZONE Medical Alert - Get Help    What it looks like:     Depression is seriously interfering with your life.     You may experience these or other symptoms: You can t get out of bed most days, can t work or engage in other necessary activities, you have trouble taking care of basic hygiene, or basic responsibilities, thoughts of suicide or death that will not go away, self-injurious behavior.     What you need  to do:  1. Call your care team and request a same-day appointment. If they are not available (weekends or after hours) call your local crisis line, emergency room or 911.      Electronically signed by: Roselyn Luis, September 21, 2017    Depression Self Care Plan / Survival Kit    Self-Care for Depression  Here s the deal. Your body and mind are really not as separate as most people think.  What you do and think affects how you feel and how you feel influences what you do and think. This means if you do things that people who feel good do, it will help you feel better.  Sometimes this is all it takes.  There is also a place for medication and therapy depending on how severe your depression is, so be sure to consult with your medical provider and/ or Behavioral Health Consultant if your symptoms are worsening or not improving.     In order to better manage my stress, I will:    Exercise  Get some form of exercise, every day. This will help reduce pain and release endorphins, the  feel good  chemicals in your brain. This is almost as good as taking antidepressants!  This is not the same as joining a gym and then never going! (they count on that by the way ) It can be as simple as just going for a walk or doing some gardening, anything that will get you moving.      Hygiene   Maintain good hygiene (Get out of bed in the morning, Make your bed, Brush your teeth, Take a shower, and Get dressed like you were going to work, even if you are unemployed).  If your clothes don't fit try to get ones that do.    Diet  I will strive to eat foods that are good for me, drink plenty of water, and avoid excessive sugar, caffeine, alcohol, and other mood-altering substances.  Some foods that are helpful in depression are: complex carbohydrates, B vitamins, flaxseed, fish or fish oil, fresh fruits and vegetables.    Psychotherapy  I agree to participate in Individual Therapy (if recommended).    Medication  If prescribed  medications, I agree to take them.  Missing doses can result in serious side effects.  I understand that drinking alcohol, or other illicit drug use, may cause potential side effects.  I will not stop my medication abruptly without first discussing it with my provider.    Staying Connected With Others  I will stay in touch with my friends, family members, and my primary care provider/team.    Use your imagination  Be creative.  We all have a creative side; it doesn t matter if it s oil painting, sand castles, or mud pies! This will also kick up the endorphins.    Witness Beauty  (AKA stop and smell the roses) Take a look outside, even in mid-winter. Notice colors, textures. Watch the squirrels and birds.     Service to others  Be of service to others.  There is always someone else in need.  By helping others we can  get out of ourselves  and remember the really important things.  This also provides opportunities for practicing all the other parts of the program.    Humor  Laugh and be silly!  Adjust your TV habits for less news and crime-drama and more comedy.    Control your stress  Try breathing deep, massage therapy, biofeedback, and meditation. Find time to relax each day.     My support system    Clinic Contact:  Phone number:    Contact 1:  Phone number:    Contact 2:  Phone number:    Nondenominational/:  Phone number:    Therapist:  Phone number:    [facility] crisis center:    Phone number:    Other community support:  Phone number:

## 2017-09-21 NOTE — PATIENT INSTRUCTIONS
1. Consider LIfe Line Screening which does a state of the art ultra sound of the carotid or neck arteries for stroke risk detection, of the abdomen to detect aneurysm, and the legs to find arterial stenosis or sclerosis and is cheap.  The advertisement usually comes in the mail and they are in a school or [employer] near you or you can call [phone]    2. No difference was  Noted by patients in a double blind study when given codeine, tylenol ( acetaminophen) or ibuprofen (all in identical pills). They felt no difference in pain relief. Since ibuprofen and the NSAIDs  causes kidney damage, esophageal damage with heartburn, and can increase the risk of esophageal and stomach cancer and ulcers,and colonic strictures. They also cause increased risk of heart attack .   I recommend that you use tylenol(acetaminophen) for pain. Use the acetaminophen ES  Which has 500mgm/tablet You can take up to 2 tablets 4 times a day as need for pain.  If this is not enough, you can add in ibuprofen or aleve(naprosyn) with 2 glasses of fluid and some food-to protect the stomach and esophagus. Please let us know if you are continuing to take ibuprofen or aleve, as we will need to periodically check your kidney function with a blood test.

## 2017-09-28 NOTE — LETTER
[facility]  [address]  [address]  [place] [address] [phone]                                                                                                           Joni Muñoz  [address]  [place] [address]    September 28, 2017      Dear Joni,      I STRONGLY RECOMMEND THAT YOU QUIT SMOKING OR USING ORAL TOBACCO    FIRST START SLOWLY CUTTING DOWN ON THE TIMES OF DAY THAT YOU SMOKE     BREATHER WHEN CRAVINGS COME    PUT CIGARETTES IN A JAR TO  REMIND YOU OF YOUR HABIT    TAKE THE MONEY YOU SAVE AND PLAN A VACATION OR GET AWAY    WRITE A BIG CHECK TO AN OPPOSING POLITICAL ORG ANIZATION    NEXT CONSIDER NICOTINE REPLACEMENT EITHER PATCHES 21MG IF YOU SMOKE PACK PER DAY    14 MG  IF YOU SMOKE  15 CIGARETTES PER DAY     7MG IF  SMOKE 10 CIGARETTES PER DAY     ORAL LOZENGES, OR GUM, OR INHALER ARE ALSO A CONSIDERATION  FOR CRAVINGS    E CIGARETTES HAVE NOT BEEN PROVEN, BUT MIGHT BE A CONSIDERATION BUT KEEP THEM AWAY FROM CHILDREN AND OTHERS     ZYBAN OR WELLBUTRIN 100MG SR OR 150MG PO TWICE DAILY  X 2 WEEKS    THEN SET A DATE TO QUIT    DON'T TAKE IF YOU HAVE HAD SEIZURES    LASTLY CONSIDER CHANTIX     SIDE EFFECTS INCLUDE ANOREXIA AND NAUSEA AND POSSIBLE WORSENING OF DEPRESSION  BUT  IT HAS THE HIGHEST QUIT RATE    SET A DATE TO QUIT IN TWO WEEKS    DEVELOP BEHAVIORS TO HELP FIGHT THE URGE    POSITIVE AFFIRMATIONS     RELY ON HIGHER POWER IF YOU HAVE ONE           Thank you for choosing [facility].  We appreciate the opportunity to serve you and look forward to supporting your healthcare needs in the future.    If you have any questions or concerns, please call me or my staff at [phone].      Sincerely,    Ike Luis Jr MD

## 2017-09-28 NOTE — PROGRESS NOTES
SUBJECTIVE:   Joni Muñoz is a 66 year old male who presents to clinic today for the following health issues:      Medication Followup of gabapentin    Taking Medication as prescribed: yes    Side Effects:  None    Medication Helping Symptoms:  yes         RESPIRATORY SYMPTOMS      Duration: 3-4 weeks    Description  nasal congestion, rhinorrhea, cough, headache and fatigue/malaise    Severity: moderate    Accompanying signs and symptoms: watery eyes    History (predisposing factors):  tobacco abuse and recurring in the fall    Precipitating or alleviating factors: None    Therapies tried and outcome:  None        Smoker     Sinus pressure    Chronic bronchitis exacerbation     Sister in bad shape     Unable to walk and stand        CHRONIC LOWER BACK PAIN with RADICULOPATHY     LEFT LEG INTO FOOT     GABAPENTIN     SURGERY     GABAPENTIN PER DAY  6 PER DAY     HISTORY OF LOWER BACK PAIN SURGERY     ONGOING ISSUES     WORSENED AFTER SURGERY     FUSION     USED TO GOLF   HE HAS A PIT BULL Result was abnormal cocaine was present in the urine test. .       COPD Follow-Up    Symptoms are currently: slightly worsened    Current fatigue or dyspnea with ambulation: YES     Shortness of breath: slightly worsened    Increased or change in Cough/Sputum: No    Fever(s): No    Baseline ambulation without stopping to rest:    blocks. Able to walk up 1 flights of stairs without stopping to rest.    Any ER/UC or hospital admissions since your last visit? No     History   Smoking Status     Current Every Day Smoker     Packs/day: 0.50     Years: 30.00     Types: Cigarettes   Smokeless Tobacco     Current User     Lab Results   Component Value Date    FEV1 2.60 03/26/2015    WSE2WJP 67 03/26/2015     Chronic Pain Follow-Up 25 YEARS AND GETTING WORSE   DOWN LEFT LEG        Type / Location of Pain:  LOWER BACK PAIN LEFT LEG SACROILIAC   Analgesia/pain control:       Recent changes:  worse      Overall control: Tolerable with  discomfort  Activity level/function:      Daily activities:  Able to do light housework, cooking    Work:  not applicable  Adverse effects:  No  Adherance    Taking medication as directed?  Yes    Participating in other treatments: yes  Risk Factors:    Sleep:  Good    Mood/anxiety:  controlled    Recent family or social stressors:  none noted    Other aggravating factors: none  PHQ-9 SCORE 9/6/2016 6/28/2017 9/28/2017   Total Score - - -   Total Score MyChart - - 16 (Moderately severe depression)   Total Score 4 10 16     LATRICIA-7 SCORE 6/28/2017   Total Score 7     Encounter-Level CSA:     There are no encounter-level csa.        Back Pain Follow Up      Description:   Location of pain:  left  Character of pain: sharp  Pain radiation: radiates into the left buttocks and radiates below the knee   Since last visit, pain is:  worsened  New numbness or weakness in legs, not attributed to pain:  no     Intensity: moderate    History:   Pain interferes with job: Not applicable  Therapies tried without relief: acetaminophen (Tylenol) and Physical Therapy  Therapies tried with relief: acetaminophen (Tylenol), massage and muscle relaxants           Accompanying Signs & Symptoms:  Risk of Fracture:  None  Risk of Cauda Equina:  None  Risk of Infection:  None  Risk of Cancer:  None            Amount of exercise or physical activity: 6-7 days/week for an average of 15-30 minutes    Problems taking medications regularly: No    Medication side effects: none  Diet: low salt, low fat/cholesterol and diabetic     .  Current Outpatient Prescriptions   Medication Sig Dispense Refill     gabapentin (NEURONTIN) 600 MG tablet TAKE 2 TABLETS BY MOUTH THREE TIMES DAILY 180 tablet 2     cyclobenzaprine (FLEXERIL) 10 MG tablet Take 1 tablet (10 mg) by mouth 3 times daily as needed for muscle spasms 90 tablet 5     gabapentin (NEURONTIN) 600 MG tablet TAKE 2 TABLETS BY MOUTH THREE TIMES DAILY 180 tablet 2     cyclobenzaprine (FLEXERIL) 10 MG  tablet Take 1 tablet (10 mg) by mouth 3 times daily as needed for muscle spasms 90 tablet 5     albuterol (PROAIR HFA) 108 (90 BASE) MCG/ACT Inhaler Inhale 2 puffs into the lungs every 6 hours 1 Inhaler 5     azithromycin (ZITHROMAX) 500 MG tablet Take 1 tablet (500 mg) by mouth daily 3 tablet 0     beclomethasone (QVAR) 80 MCG/ACT Inhaler Inhale 2 puffs into the lungs 2 times daily 1 Inhaler 11     doxepin (SINEQUAN) 25 MG capsule Take 1 capsule (25 mg) by mouth At Bedtime (Patient not taking: Reported on 9/28/2017) 30 capsule 1     zolpidem (AMBIEN) 5 MG tablet Take 1 tablet (5 mg) by mouth nightly as needed for sleep (Patient not taking: Reported on 9/28/2017) 30 tablet 5     [DISCONTINUED] gabapentin (NEURONTIN) 600 MG tablet TAKE 2 TABLETS BY MOUTH THREE TIMES DAILY 180 tablet 2     DULoxetine (CYMBALTA) 30 MG EC capsule Take 1 capsule (30 mg) by mouth 2 times daily (Patient not taking: Reported on 9/28/2017) 60 capsule 5     [DISCONTINUED] albuterol (ALBUTEROL) 108 (90 BASE) MCG/ACT Inhaler Inhale 2 puffs into the lungs every 6 hours 1 Inhaler 5          Allergies   Allergen Reactions     Acetaminophen Nausea       Immunization History   Administered Date(s) Administered     Influenza (IIV3) 10/06/2009, 10/14/2010, 10/03/2011, 09/24/2012, 11/18/2013     Influenza Vaccine IM 3yrs+ 4 Valent IIV4 10/01/2014     Pneumococcal (PCV 13) 06/28/2017     TDAP Vaccine (Adacel) 06/28/2017     Tdap (Adacel,Boostrix) 10/15/2007         reports that he does not drink alcohol.      reports that he does not use illicit drugs.    family history includes CEREBROVASCULAR DISEASE in his father; Unknown/Adopted in his mother. There is no history of DIABETES, Coronary Artery Disease, Hypertension, Hyperlipidemia, Breast Cancer, Colon Cancer, Prostate Cancer, Other Cancer, Depression, Anxiety Disorder, MENTAL ILLNESS, Substance Abuse, Anesthesia Reaction, Asthma, OSTEOPOROSIS, Genetic Disorder, Thyroid Disease, or  Obesity.    indicated that the status of his no family hx of is unknown. He indicated that his mother is alive. He indicated that his father is . He indicated that both of his sisters are alive. He indicated that both of his brothers are alive.      has a past surgical history that includes back surgery; hernia repair (); and orthopedic surgery.     reports that he currently engages in sexual activity and has had female partners.  .  Pediatric History   Patient Guardian Status     Not on file.     Other Topics Concern     Parent/Sibling W/ Cabg, Mi Or Angioplasty Before 65f 55m? Yes      Service No     Blood Transfusions No     Caffeine Concern No     Occupational Exposure No     Hobby Hazards No     Sleep Concern No     Stress Concern No     Weight Concern No     Special Diet No     Back Care Yes     Exercise Yes     Bike Helmet No     Seat Belt Yes     Self-Exams No     Social History Narrative         reports that he has been smoking Cigarettes.  He has a 15.00 pack-year smoking history. He uses smokeless tobacco.    Medical, social, surgical, and family histories reviewed.    Labs reviewed in EPIC  Patient Active Problem List   Diagnosis     Sprain of great toe     Chronic pain syndrome     Moderate episode of recurrent major depressive disorder (H)     Tobacco use disorder     Backache     Degeneration of lumbar or lumbosacral intervertebral disc     Intervertebral lumbar disc disorder with myelopathy, lumbar region     Facet arthritis of lumbar region (H)     Chronic low back pain     Sacroiliac dysfunction     Lumbago     Hyperlipidemia LDL goal <130     Cocaine abuse     Shoulder pain, right     Past Surgical History:   Procedure Laterality Date     BACK SURGERY       HERNIA REPAIR       ORTHOPEDIC SURGERY      L arm        Social History   Substance Use Topics     Smoking status: Current Every Day Smoker     Packs/day: 0.50     Years: 30.00     Types: Cigarettes     Smokeless  tobacco: Current User     Alcohol use No     Family History   Problem Relation Age of Onset     Unknown/Adopted Mother      CEREBROVASCULAR DISEASE Father      DIABETES No family hx of      Coronary Artery Disease No family hx of      Hypertension No family hx of      Hyperlipidemia No family hx of      Breast Cancer No family hx of      Colon Cancer No family hx of      Prostate Cancer No family hx of      Other Cancer No family hx of      Depression No family hx of      Anxiety Disorder No family hx of      MENTAL ILLNESS No family hx of      Substance Abuse No family hx of      Anesthesia Reaction No family hx of      Asthma No family hx of      OSTEOPOROSIS No family hx of      Genetic Disorder No family hx of      Thyroid Disease No family hx of      Obesity No family hx of          Allergies   Allergen Reactions     Acetaminophen Nausea     Recent Labs   Lab Test  05/17/16   0924  04/16/13   0847   LDL   --   144*   HDL   --   53   TRIG   --   80   ALT  26  77*   CR  0.84  0.80   GFRESTIMATED  >90  Non  GFR Calc    >90   GFRESTBLACK  >90   GFR Calc    >90   POTASSIUM  3.5  4.6   TSH   --   1.55        BP Readings from Last 6 Encounters:   09/28/17 112/66   09/21/17 110/70   07/12/17 132/86   06/28/17 100/60   02/22/17 112/74   12/14/16 130/86       Wt Readings from Last 3 Encounters:   09/28/17 162 lb (73.5 kg)   09/21/17 163 lb (73.9 kg)   07/12/17 162 lb (73.5 kg)         Positive symptoms or findings indicated by bold designation:     ROS: 10 point ROS neg other than the symptoms noted above in the HPI.except  has Sprain of great toe; Chronic pain syndrome; Moderate episode of recurrent major depressive disorder (H); Tobacco use disorder; Backache; Degeneration of lumbar or lumbosacral intervertebral disc; Intervertebral lumbar disc disorder with myelopathy, lumbar region; Facet arthritis of lumbar region (H); Chronic low back pain; Sacroiliac dysfunction; Lumbago;  Hyperlipidemia LDL goal <130; Cocaine abuse; and Shoulder pain, right on his problem list.   Constitutional: The patient denied fatigue, fever, insomnia, night sweats, recent illness and weight loss.  WEIGHT STABLE     Eyes: The patient denied blindness, eye pain, eye tearing, photophobia, vision change and visual disturbance. READERS       Ears/Nose/Throat/Neck: The patient denied dizziness, facial pain, hearing loss, nasal discharge, oral pain, otalgia, postnasal drip, sinus congestion, sore throat, tinnitus and voice change.   POOR     Cardiovascular: The patient denied arrhythmia, chest pain/pressure, claudication, edema, exercise intolerance, fatigue, orthopnea, palpitations and syncope.  HISTORY OF CHEST PAIN NEGATIVE FOR MYOCARDIAL INFARCTION     Respiratory: The patient denied asthma, chest congestion, cough, dyspnea on exertion, dyspnea/shortness of breath, hemoptysis, pedal edema, pleuritic pain, productive sputum, snoring and wheezing. CHRONIC OBSTRUCTIVE LUNG DISEASE WITH AN EXERCERBATION     Gastrointestinal: The patient denied abdominal pain, anorexia, constipation, diarrhea, dysphagia, gastroesophageal reflux, hematochezia, hemorrhoids, melena, nausea and vomiting . NORMAL     Genitourinary/Nephrology: The patient denied breast complaint, dysuria, nocturia sexual dysfunction, t, urinary frequency, urinary incontinence, urinary urgency   NORMAL     Musculoskeletal: The patient denied arthralgia(s), back pain, joint complaint, muscle weakness, myalgias, osteoporosis, sciatica, stiffness and swelling. CHRONIC LOWER BACK PAIN     Dermatoligic:: The patient denied acne, dermatitis, ecchymosis, itching, mole change, rash, skin cancer, skin lesion and sores.      Neurologic: The patient denied dizziness, gait abnormality, headache, memory loss, mental status change, paresis, paresthesia, seizure, syncope, tremor and vision change.     Psychiatric: The patient denied anxiety, depression, disturbances of  memory, drug abuse, insomnia, mood swings and relationship difficulties.  NORMAL     Endocrine: The patient denied , goiter, obesity, polyuria and thyroid disease. NORMAL     Hematologic/Lymphatic: The patient denied abnormal bleeding and bruising, abnormal ecchymoses, anemia, lymph node enlargement/mass, petechiae and venous  Thrombosis. NORMAL     Allergy/Immunology: The patient denied food allergy and  Allergic rhinitis or conjunctivitis. NORMAL       PE:  /66  Pulse 63  Temp 97.3  F (36.3  C) (Tympanic)  Resp 20  Wt 162 lb (73.5 kg)  SpO2 94%  BMI 23.24 kg/m2 Body mass index is 23.24 kg/(m^2).    Constitutional: general appearance, well nourished, well developed, in no acute distress, well developed, appears stated age, normal body habitus, NORMAL N    Eyes:; The patient has normal eyelids sclerae and conjunctivae : NORMAL      Ears/Nose/Throat: external ear, overall: normal appearance; external nose, overall: benign appearance, normal moujth gums and lips  The patient has: NORMAL     Neck: thyroid, overall: normal size, normal consistency, nontender, NORMAL     Respiratory:  palpation of chest, overall: normal excursion, NORMAL   Clear to percussion and auscultation RHONCHI     Tachypnea N left  Color  NOL     Cardiovascular:  Good color with no peripheral edema NORMAL   Regular sinus rhythm without murmur. Physiologic heart sounds Heart is unelarged  .   Chest/Breast: normal shape NORMAL      Abdominal exam,  Liver and spleen are  unenlarged  NORMAL       Tenderness  NORMAL   Scars  NORMAL     Urogenital; no renal, flank or bladder  tenderness;      Lymphatic: neck nodes,     Other nodes     Musculoskeletal:  Brief ortho exam normal except:       Integument: inspection of skin, no rash, lesions; and, palpation, no induration, no tenderness.      Neurologic mental status, overall: alert and oriented; gait, no ataxia, no unsteadiness; coordination, no tremors; cranial nerves, overall: normal motor,  overall: normal bulk, tone.      Psychiatric: orientation/consciousness, overall: oriented to person, place and time; behavior/psychomotor activity, no tics, normal psychomotor activity; mood and affect, overall: normal mood and affect; appearance, overall: well-groomed, good eye contact; speech, overall: normal quality, no aphasia and normal quality, quantity, intact.      Diagnostic Test Results:  Results for orders placed or performed in visit on 07/12/17   Gabapentin level   Result Value Ref Range    Gabapentin Level 7.8    Drug  Screen Comprehensive , Urine with Reported Meds ([facility]) (Pain Care Package)   Result Value Ref Range    Pain Drug SCR UR W RPTD Meds       FINAL  (Note)  ====================================================================  [facility] COMP DRUG ANALYSIS,UR  ====================================================================  Test                             Result       Flag       Units    Drug Present   Cocaine                        >3571                   ng/mg creat   Benzoylecgonine                >3571                   ng/mg creat    Source of cocaine is most commonly illicit, but cocaine is    present in some topical anesthetic solutions. Benzoylecgonine is    an expected metabolite of cocaine.     Methadone                      >7143                   ng/mg creat   EDDP (Methadone Mtb)           >7143                   ng/mg creat    Sources of methadone include scheduled prescription medications.    EDDP is an expected metabolite of methadone.     Gabapentin                     PRESENT   Cyclobenzaprine                PRESENT   Ibuprofen                      PRESENT  ============================================= =======================  Test                      Result    Flag   Units      Ref Range   Creatinine              140              mg/dL      >=20  ====================================================================  Declared Medications:  Medication list was not  provided.  ====================================================================  For clinical consultation, please call [phone].  ====================================================================  Analysis performed by [employer] [place] [address]           ICD-10-CM    1. Chronic pain syndrome G89.4    2. Screen for colon cancer Z12.11    3. Intervertebral lumbar disc disorder with myelopathy, lumbar region M51.06    4. Tobacco use disorder F17.200 TOBACCO CESSATION ORDER FOR    5. Need for prophylactic vaccination and inoculation against influenza Z23    6. Hyperlipidemia LDL goal <100 E78.5 Lipid panel reflex to direct LDL   7. Chronic midline low back pain with sciatica, sciatica laterality unspecified M54.40 gabapentin (NEURONTIN) 600 MG tablet    G89.29 gabapentin (NEURONTIN) 600 MG tablet   8. Chronic bilateral low back pain with left-sided sciatica M54.42 cyclobenzaprine (FLEXERIL) 10 MG tablet    G89.29 cyclobenzaprine (FLEXERIL) 10 MG tablet   9. Wheezing R06.2 albuterol (PROAIR HFA) 108 (90 BASE) MCG/ACT Inhaler   10. Obstructive chronic bronchitis with exacerbation (H) J44.1 COPD ACTION PLAN - order for Health Maintenance     azithromycin (ZITHROMAX) 500 MG tablet        .    Side effects benefits and risks thoroughly discussed. .he may come in early if unimproved or getting worse          Importance of adhering to regimen discussed and if medications were dispensed, the importance of taking medications discussed and bringing in the medications after every visit for chronic problems         Please drink 2 glasses of water prior to meals and walk 15-30 minutes after meals    I spent 25 MINUTES SPENT  with patient massiel ssing the following issues   The primary encounter diagnosis was Chronic pain syndrome. Diagnoses of Screen for colon cancer, Intervertebral lumbar disc disorder with myelopathy, lumbar region, Tobacco use disorder, Need for prophylactic vaccination and  inoculation against influenza, Hyperlipidemia LDL goal <100, Chronic midline low back pain with sciatica, sciatica laterality unspecified, Chronic bilateral low back pain with left-sided sciatica, Wheezing, and Obstructive chronic bronchitis with exacerbation (H) were also pertinent to this visit. over half of which involved counseling and coordination of care.    Patient Instructions   Assessment: Lower back pain    Plan: do these exercises at least twice daily:  Standing or sitting exercise can be done every two hours    Earle' Flexion Versus Beronica   Extension Exercises For   Low Back Pain   Examples of Earle' Flexion Exercises  1. Pelvic tilt.  Please press the small of your back against the floor.  Start with 5-10  and increase to 100 count over one month   2. Single Knee to chest. Lie on your back with legs in bent position. Alternate one leg and the other very slowly bringing the knee to chest.  Start with a count of 5-10   Over one month work up to 100  3. Double knee to chest.  Lie on your back with knees in bent position.  Bring both knees to the chest slowly hold for a count of 5-to 10. Over one month work to 100  4. Partial sit-up or crunch.  Lie on your back in bent leg position.  Please bring your body with arms crossed in front  To 30 degrees of flexion. Start with 5-10 over one month work up to 100 or more  5. Sit back.  Please sit on the side of the bed or a stair landing  And lie backwards until the abdominal muscles start to quiver.  Hold for a count of 5-10 and over a month work up to 100.  5. Hamstring stretch.  Please extend your legs while sitting on the floor as tolerated for 5-10 count.  Gradually increase to count of 30 over one month      Alternately find a stair landing or sturdy chair and place heel  In a comfortable level of extension  And stretch one hamstring at a time for 5-10 seconds.  Increase to count of 30 over one month                         Squat.  Stand with legs  comfortably apart and lower the body slowly by flexing the knees  for count of 5-10 over one month increase to 30.  Useful for anterior disc protrusion, facette joint arthritis spond-10ylolysis, spondylolisthesis and spinal stenosis  (G89.4) Chronic pain syndrome  (primary encounter diagnosis)  Comment:    Plan:      (Z12.11) Screen for colon cancer  Comment:    Plan:      (M51.06) Intervertebral lumbar disc disorder with myelopathy, lumbar region  Comment:    Plan:      (F17.200) Tobacco use disorder  Comment:    Plan: TOBACCO CESSATION ORDER FOR HM             (Z23) Need for prophylactic vaccination and inoculation against influenza  Comment:    Plan:      (E78.5) Hyperlipidemia LDL goal <100  Comment:    Plan: Lipid panel reflex to direct LDL             (M54.40,  G89.29) Chronic midline low back pain with sciatica, sciatica laterality unspecified  Comment:    Plan: gabapentin (NEURONTIN) 600 MG tablet             (M54.42,  G89.29) Chronic bilateral low back pain with left-sided sciatica  Comment:    Plan: cyclobenzaprine (FLEXERIL) 10 MG tablet             (R06.2) Wheezing  Comment:    Plan:       (J44.1) Obstructive chronic bronchitis with exacerbation (H)  Comment:    Plan: COPD ACTION PLAN - order for Health Maintenance                       ALL THE ABOVE PROBLEMS ARE STABLE AND MED CHANGES AS NOTED    Diet:  MEDITERRANEAN DIET RECOMMENDED     Exercise:  CHRONIC LOWER BACK PAIN   Exercises Range of motion, balance, isometric, and strengthening exercises 30 repetitions twice daily of involved joints      .HALLIE TREJO MD 9/28/2017 10:04 AM  September 28, 2017

## 2017-09-28 NOTE — PATIENT INSTRUCTIONS
Assessment: Lower back pain    Plan: do these exercises at least twice daily:  Standing or sitting exercise can be done every two hours    Earle' Flexion Versus Beronica   Extension Exercises For   Low Back Pain   Examples of Earle' Flexion Exercises  1. Pelvic tilt.  Please press the small of your back against the floor.  Start with 5-10  and increase to 100 count over one month   2. Single Knee to chest. Lie on your back with legs in bent position. Alternate one leg and the other very slowly bringing the knee to chest.  Start with a count of 5-10   Over one month work up to 100  3. Double knee to chest.  Lie on your back with knees in bent position.  Bring both knees to the chest slowly hold for a count of 5-to 10. Over one month work to 100  4. Partial sit-up or crunch.  Lie on your back in bent leg position.  Please bring your body with arms crossed in front  To 30 degrees of flexion. Start with 5-10 over one month work up to 100 or more  5. Sit back.  Please sit on the side of the bed or a stair landing  And lie backwards until the abdominal muscles start to quiver.  Hold for a count of 5-10 and over a month work up to 100.  5. Hamstring stretch.  Please extend your legs while sitting on the floor as tolerated for 5-10 count.  Gradually increase to count of 30 over one month      Alternately find a stair landing or sturdy chair and place heel  In a comfortable level of extension  And stretch one hamstring at a time for 5-10 seconds.  Increase to count of 30 over one month                         Squat.  Stand with legs comfortably apart and lower the body slowly by flexing the knees  for count of 5-10 over one month increase to 30.  Useful for anterior disc protrusion, facette joint arthritis spond-10ylolysis, spondylolisthesis and spinal stenosis  (G89.4) Chronic pain syndrome  (primary encounter diagnosis)  Comment:    Plan:      (Z12.11) Screen for colon cancer  Comment:    Plan:      (M51.06)  Intervertebral lumbar disc disorder with myelopathy, lumbar region  Comment:    Plan:      (F17.200) Tobacco use disorder  Comment:    Plan: TOBACCO CESSATION ORDER FOR HM             (Z23) Need for prophylactic vaccination and inoculation against influenza  Comment:    Plan:      (E78.5) Hyperlipidemia LDL goal <100  Comment:    Plan: Lipid panel reflex to direct LDL             (M54.40,  G89.29) Chronic midline low back pain with sciatica, sciatica laterality unspecified  Comment:    Plan: gabapentin (NEURONTIN) 600 MG tablet             (M54.42,  G89.29) Chronic bilateral low back pain with left-sided sciatica  Comment:    Plan: cyclobenzaprine (FLEXERIL) 10 MG tablet             (R06.2) Wheezing  Comment:    Plan:       (J44.1) Obstructive chronic bronchitis with exacerbation (H)  Comment:    Plan: COPD ACTION PLAN - order for Health Maintenance

## 2017-09-28 NOTE — NURSING NOTE
"Chief Complaint   Patient presents with     URI     Recheck Medication       Initial /66  Pulse 63  Temp 97.3  F (36.3  C) (Tympanic)  Resp 20  Wt 162 lb (73.5 kg)  SpO2 94%  BMI 23.24 kg/m2 Estimated body mass index is 23.24 kg/(m^2) as calculated from the following:    Height as of 9/21/17: 5' 10\" (1.778 m).    Weight as of this encounter: 162 lb (73.5 kg).  Medication Reconciliation: complete   Janeen Cruz CMA    "

## 2017-09-28 NOTE — MR AVS SNAPSHOT
After Visit Summary   9/28/2017    Joni Muñoz    MRN: [id number]           Patient Information     Date Of Birth          1950        Visit Information        Provider Department      9/28/2017 9:45 AM Ike Luis MD [facility]        Today's Diagnoses     Chronic pain syndrome    -  1    Screen for colon cancer        Intervertebral lumbar disc disorder with myelopathy, lumbar region        Tobacco use disorder        Need for prophylactic vaccination and inoculation against influenza        Hyperlipidemia LDL goal <100        Chronic midline low back pain with sciatica, sciatica laterality unspecified        Chronic bilateral low back pain with left-sided sciatica        Wheezing        Obstructive chronic bronchitis with exacerbation (H)          Care Instructions    Assessment: Lower back pain    Plan: do these exercises at least twice daily:  Standing or sitting exercise can be done every two hours    Earle' Flexion Versus Beronica   Extension Exercises For   Low Back Pain   Examples of Earle' Flexion Exercises  1. Pelvic tilt.  Please press the small of your back against the floor.  Start with 5-10  and increase to 100 count over one month   2. Single Knee to chest. Lie on your back with legs in bent position. Alternate one leg and the other very slowly bringing the knee to chest.  Start with a count of 5-10   Over one month work up to 100  3. Double knee to chest.  Lie on your back with knees in bent position.  Bring both knees to the chest slowly hold for a count of 5-to 10. Over one month work to 100  4. Partial sit-up or crunch.  Lie on your back in bent leg position.  Please bring your body with arms crossed in front  To 30 degrees of flexion. Start with 5-10 over one month work up to 100 or more  5. Sit back.  Please sit on the side of the bed or a stair landing  And lie backwards until the abdominal muscles start to  quiver.  Hold for a count of 5-10 and over a month work up to 100.  5. Hamstring stretch.  Please extend your legs while sitting on the floor as tolerated for 5-10 count.  Gradually increase to count of 30 over one month      Alternately find a stair landing or sturdy chair and place heel  In a comfortable level of extension  And stretch one hamstring at a time for 5-10 seconds.  Increase to count of 30 over one month                         Squat.  Stand with legs comfortably apart and lower the body slowly by flexing the knees  for count of 5-10 over one month increase to 30.  Useful for anterior disc protrusion, facette joint arthritis spond-10ylolysis, spondylolisthesis and spinal stenosis  (G89.4) Chronic pain syndrome  (primary encounter diagnosis)  Comment:    Plan:      (Z12.11) Screen for colon cancer  Comment:    Plan:      (M51.06) Intervertebral lumbar disc disorder with myelopathy, lumbar region  Comment:    Plan:      (F17.200) Tobacco use disorder  Comment:    Plan: TOBACCO CESSATION ORDER FOR              (Z23) Need for prophylactic vaccination and inoculation against influenza  Comment:    Plan:      (E78.5) Hyperlipidemia LDL goal <100  Comment:    Plan: Lipid panel reflex to direct LDL             (M54.40,  G89.29) Chronic midline low back pain with sciatica, sciatica laterality unspecified  Comment:    Plan: gabapentin (NEURONTIN) 600 MG tablet             (M54.42,  G89.29) Chronic bilateral low back pain with left-sided sciatica  Comment:    Plan: cyclobenzaprine (FLEXERIL) 10 MG tablet             (R06.2) Wheezing  Comment:    Plan:       (J44.1) Obstructive chronic bronchitis with exacerbation (H)  Comment:    Plan: COPD ACTION PLAN - order for Health Maintenance                         Follow-ups after your visit        Who to contact     If you have questions or need follow up information about today's clinic visit or your schedule please contact [facility]  "[facility] directly at [phone].  Normal or non-critical lab and imaging results will be communicated to you by MyChart, letter or phone within 4 business days after the clinic has received the results. If you do not hear from us within 7 days, please contact the clinic through [facility] [employer] or phone. If you have a critical or abnormal lab result, we will notify you by phone as soon as possible.  Submit refill requests through [employer] or call your pharmacy and they will forward the refill request to us. Please allow 3 business days for your refill to be completed.          Additional Information About Your Visit        [facility] Information     Hum lets you send messages to your doctor, view your test results, renew your prescriptions, schedule appointments and more. To sign up, go to [email or web address] . Click on \"Log in\" on the left side of the screen, which will take you to the Welcome page. Then click on \"Sign up Now\" on the right side of the page.     You will be asked to enter the access code listed below, as well as some personal information. Please follow the directions to create your username and password.     Your access code is: [email or web address]  Expires: 2017 10:14 AM     Your access code will  in 90 days. If you need help or a new code, please call your [facility] clinic or [phone].        Care EveryWhere ID     This is your Care EveryWhere ID. This could be used by other organizations to access your [facility] medical records  [id number]        Your Vitals Were     Pulse Temperature Respirations Pulse Oximetry BMI (Body Mass Index)       63 97.3  F (36.3  C) (Tympanic) 20 94% 23.24 kg/m2        Blood Pressure from Last 3 Encounters:   17 112/66   17 110/70   17 132/86    Weight from Last 3 Encounters:   17 162 lb (73.5 kg)   17 163 lb (73.9 kg)   17 162 lb (73.5 kg)              We Performed the Following     COPD ACTION PLAN - order for Health " Maintenance     Lipid panel reflex to direct LDL     TOBACCO CESSATION ORDER FOR HM          Today's Medication Changes          These changes are accurate as of: 9/28/17 10:22 AM.  If you have any questions, ask your nurse or doctor.               Start taking these medicines.        Dose/Directions    azithromycin 500 MG tablet   Commonly known as:  ZITHROMAX   Used for:  Obstructive chronic bronchitis with exacerbation (H)   Started by:  Ike Luis MD        Dose:  500 mg   Take 1 tablet (500 mg) by mouth daily   Quantity:  3 tablet   Refills:  0       * cyclobenzaprine 10 MG tablet   Commonly known as:  FLEXERIL   Used for:  Chronic bilateral low back pain with left-sided sciatica   Started by:  Ike Luis MD        Dose:  10 mg   Take 1 tablet (10 mg) by mouth 3 times daily as needed for muscle spasms   Quantity:  90 tablet   Refills:  5       * cyclobenzaprine 10 MG tablet   Commonly known as:  FLEXERIL   Used for:  Chronic bilateral low back pain with left-sided sciatica   Started by:  Ike Luis MD        Dose:  10 mg   Take 1 tablet (10 mg) by mouth 3 times daily as needed for muscle spasms   Quantity:  90 tablet   Refills:  5       * gabapentin 600 MG tablet   Commonly known as:  NEURONTIN   Used for:  Chronic midline low back pain with sciatica, sciatica laterality unspecified   Started by:  Ike Luis MD        TAKE 2 TABLETS BY MOUTH THREE TIMES DAILY   Quantity:  180 tablet   Refills:  2       * gabapentin 600 MG tablet   Commonly known as:  NEURONTIN   Used for:  Chronic midline low back pain with sciatica, sciatica laterality unspecified   Started by:  Ike Luis MD        TAKE 2 TABLETS BY MOUTH THREE TIMES DAILY   Quantity:  180 tablet   Refills:  2       * Notice:  This list has 4 medication(s) that are the same as other medications prescribed for you. Read the directions carefully, and ask your doctor or other care provider to  review them with you.         Where to get your medicines      These medications were sent to [facility] [address] [place] [address] [address] [facility]  [address] [place] [address]    Hours:  24-hours Phone:  [phone]     albuterol 108 (90 BASE) MCG/ACT Inhaler    azithromycin 500 MG tablet    cyclobenzaprine 10 MG tablet    cyclobenzaprine 10 MG tablet    gabapentin 600 MG tablet    gabapentin 600 MG tablet                Primary Care Provider Office Phone # Fax #    Kodi Gonzalez [phone]       [address]  [facility] [address]        Equal Access to Services     JOSH BENTON : Hadii lori bradshaw hadasho Soomaali, waaxda luqadaha, qaybta kaalmada adeegyada, clifford walden . So [facility] [phone].    ATENCIÓN: Si habla español, tiene a beckett disposición servicios gratuitos de asistencia lingüística. Llame al [phone].    We comply with applicable federal civil rights laws and [place] laws. We do not discriminate on the basis of race, color, national origin, age, disability sex, sexual orientation or gender identity.            Thank you!     Thank you for choosing [facility]  for your care. Our goal is always to provide you with excellent care. Hearing back from our patients is one way we can continue to improve our services. Please take a few minutes to complete the written survey that you may receive in the mail after your visit with us. Thank you!             Your Updated Medication List - Protect others around you: Learn how to safely use, store and throw away your medicines at www.disposemymeds.org.          This list is accurate as of: 9/28/17 10:22 AM.  Always use your most recent med list.                   Brand Name Dispense Instructions for use Diagnosis    albuterol 108 (90 BASE) MCG/ACT Inhaler    PROAIR HFA    1 Inhaler    Inhale 2 puffs into the lungs every 6 hours     Wheezing       azithromycin 500 MG tablet    ZITHROMAX    3 tablet    Take 1 tablet (500 mg) by mouth daily    Obstructive chronic bronchitis with exacerbation (H)       beclomethasone 80 MCG/ACT Inhaler    QVAR    1 Inhaler    Inhale 2 puffs into the lungs 2 times daily    Cough       * cyclobenzaprine 10 MG tablet    FLEXERIL    90 tablet    Take 1 tablet (10 mg) by mouth 3 times daily as needed for muscle spasms    Chronic bilateral low back pain with left-sided sciatica       * cyclobenzaprine 10 MG tablet    FLEXERIL    90 tablet    Take 1 tablet (10 mg) by mouth 3 times daily as needed for muscle spasms    Chronic bilateral low back pain with left-sided sciatica       doxepin 25 MG capsule    SINEquan    30 capsule    Take 1 capsule (25 mg) by mouth At Bedtime    Insomnia, unspecified type       DULoxetine 30 MG EC capsule    CYMBALTA    60 capsule    Take 1 capsule (30 mg) by mouth 2 times daily    Moderate episode of recurrent major depressive disorder (H)       * gabapentin 600 MG tablet    NEURONTIN    180 tablet    TAKE 2 TABLETS BY MOUTH THREE TIMES DAILY    Chronic midline low back pain with sciatica, sciatica laterality unspecified       * gabapentin 600 MG tablet    NEURONTIN    180 tablet    TAKE 2 TABLETS BY MOUTH THREE TIMES DAILY    Chronic midline low back pain with sciatica, sciatica laterality unspecified       zolpidem 5 MG tablet    AMBIEN    30 tablet    Take 1 tablet (5 mg) by mouth nightly as needed for sleep    Other insomnia       * Notice:  This list has 4 medication(s) that are the same as other medications prescribed for you. Read the directions carefully, and ask your doctor or other care provider to review them with you.

## 2018-01-01 ENCOUNTER — APPOINTMENT (OUTPATIENT)
Dept: GENERAL RADIOLOGY | Facility: CLINIC | Age: 68
DRG: 871 | End: 2018-01-01
Attending: EMERGENCY MEDICINE
Payer: MEDICARE

## 2018-01-01 ENCOUNTER — APPOINTMENT (OUTPATIENT)
Dept: CT IMAGING | Facility: CLINIC | Age: 68
DRG: 871 | End: 2018-01-01
Attending: EMERGENCY MEDICINE
Payer: MEDICARE

## 2018-01-01 ENCOUNTER — APPOINTMENT (OUTPATIENT)
Dept: MRI IMAGING | Facility: CLINIC | Age: 68
DRG: 871 | End: 2018-01-01
Attending: INTERNAL MEDICINE
Payer: MEDICARE

## 2018-01-01 ENCOUNTER — APPOINTMENT (OUTPATIENT)
Dept: GENERAL RADIOLOGY | Facility: CLINIC | Age: 68
DRG: 871 | End: 2018-01-01
Attending: INTERNAL MEDICINE
Payer: MEDICARE

## 2018-01-01 ENCOUNTER — HOSPITAL ENCOUNTER (INPATIENT)
Facility: CLINIC | Age: 68
LOS: 9 days | Discharge: HOSPICE | DRG: 871 | End: 2018-01-31
Attending: EMERGENCY MEDICINE | Admitting: INTERNAL MEDICINE
Payer: MEDICARE

## 2018-01-01 ENCOUNTER — APPOINTMENT (OUTPATIENT)
Dept: SPEECH THERAPY | Facility: CLINIC | Age: 68
DRG: 871 | End: 2018-01-01
Attending: INTERNAL MEDICINE
Payer: MEDICARE

## 2018-01-01 ENCOUNTER — OFFICE VISIT (OUTPATIENT)
Dept: FAMILY MEDICINE | Facility: CLINIC | Age: 68
End: 2018-01-01
Payer: MEDICARE

## 2018-01-01 ENCOUNTER — APPOINTMENT (OUTPATIENT)
Dept: CARDIOLOGY | Facility: CLINIC | Age: 68
DRG: 871 | End: 2018-01-01
Attending: INTERNAL MEDICINE
Payer: MEDICARE

## 2018-01-01 VITALS
HEIGHT: 70 IN | OXYGEN SATURATION: 93 % | HEART RATE: 81 BPM | WEIGHT: 148.59 LBS | TEMPERATURE: 97.5 F | DIASTOLIC BLOOD PRESSURE: 89 MMHG | BODY MASS INDEX: 21.27 KG/M2 | RESPIRATION RATE: 18 BRPM | SYSTOLIC BLOOD PRESSURE: 145 MMHG

## 2018-01-01 VITALS
TEMPERATURE: 98 F | OXYGEN SATURATION: 93 % | HEART RATE: 70 BPM | WEIGHT: 164.5 LBS | SYSTOLIC BLOOD PRESSURE: 120 MMHG | DIASTOLIC BLOOD PRESSURE: 64 MMHG | BODY MASS INDEX: 23.6 KG/M2

## 2018-01-01 DIAGNOSIS — M54.42 CHRONIC BILATERAL LOW BACK PAIN WITH LEFT-SIDED SCIATICA: ICD-10-CM

## 2018-01-01 DIAGNOSIS — A41.9 SEVERE SEPSIS (H): ICD-10-CM

## 2018-01-01 DIAGNOSIS — F17.200 TOBACCO USE DISORDER: ICD-10-CM

## 2018-01-01 DIAGNOSIS — G89.29 CHRONIC BILATERAL LOW BACK PAIN WITH LEFT-SIDED SCIATICA: ICD-10-CM

## 2018-01-01 DIAGNOSIS — N17.9 ACUTE RENAL FAILURE, UNSPECIFIED ACUTE RENAL FAILURE TYPE (H): ICD-10-CM

## 2018-01-01 DIAGNOSIS — K72.90 LIVER FAILURE WITHOUT HEPATIC COMA, UNSPECIFIED CHRONICITY (H): ICD-10-CM

## 2018-01-01 DIAGNOSIS — Z97.8 ENDOTRACHEALLY INTUBATED: ICD-10-CM

## 2018-01-01 DIAGNOSIS — R65.20 SEVERE SEPSIS (H): ICD-10-CM

## 2018-01-01 DIAGNOSIS — J10.1 INFLUENZA A: ICD-10-CM

## 2018-01-01 DIAGNOSIS — J18.9 PNEUMONIA OF LEFT LOWER LOBE DUE TO INFECTIOUS ORGANISM: ICD-10-CM

## 2018-01-01 DIAGNOSIS — E78.5 HYPERLIPIDEMIA LDL GOAL <130: ICD-10-CM

## 2018-01-01 DIAGNOSIS — J96.01 ACUTE RESPIRATORY FAILURE WITH HYPOXIA (H): ICD-10-CM

## 2018-01-01 DIAGNOSIS — J20.9 ACUTE BRONCHITIS WITH SYMPTOMS > 10 DAYS: Primary | ICD-10-CM

## 2018-01-01 LAB
ACETAMINOPHEN QUAL: NEGATIVE
ACETAMINOPHEN QUAL: NEGATIVE
ALBUMIN SERPL-MCNC: 2.2 G/DL (ref 3.4–5)
ALBUMIN SERPL-MCNC: 2.3 G/DL (ref 3.4–5)
ALBUMIN SERPL-MCNC: 2.3 G/DL (ref 3.4–5)
ALBUMIN SERPL-MCNC: 2.4 G/DL (ref 3.4–5)
ALBUMIN SERPL-MCNC: 2.5 G/DL (ref 3.4–5)
ALBUMIN SERPL-MCNC: 2.5 G/DL (ref 3.4–5)
ALBUMIN SERPL-MCNC: 3.3 G/DL (ref 3.4–5)
ALBUMIN UR-MCNC: NEGATIVE MG/DL
ALP SERPL-CCNC: 100 U/L (ref 40–150)
ALP SERPL-CCNC: 111 U/L (ref 40–150)
ALP SERPL-CCNC: 114 U/L (ref 40–150)
ALP SERPL-CCNC: 115 U/L (ref 40–150)
ALP SERPL-CCNC: 127 U/L (ref 40–150)
ALP SERPL-CCNC: 138 U/L (ref 40–150)
ALP SERPL-CCNC: 153 U/L (ref 40–150)
ALT SERPL W P-5'-P-CCNC: 118 U/L (ref 0–70)
ALT SERPL W P-5'-P-CCNC: 156 U/L (ref 0–70)
ALT SERPL W P-5'-P-CCNC: 339 U/L (ref 0–70)
ALT SERPL W P-5'-P-CCNC: 366 U/L (ref 0–70)
ALT SERPL W P-5'-P-CCNC: 515 U/L (ref 0–70)
ALT SERPL W P-5'-P-CCNC: 708 U/L (ref 0–70)
ALT SERPL W P-5'-P-CCNC: 940 U/L (ref 0–70)
AMANTADINE: NEGATIVE
AMITRIPTYLINE QUAL: NEGATIVE
AMMONIA PLAS-SCNC: 33 UMOL/L (ref 10–50)
AMMONIA PLAS-SCNC: 35 UMOL/L (ref 10–50)
AMOBARBITAL QUAL: NEGATIVE
AMOXAPINE: NEGATIVE
AMPHETAMINES QUAL: NEGATIVE
ANION GAP SERPL CALCULATED.3IONS-SCNC: 10 MMOL/L (ref 3–14)
ANION GAP SERPL CALCULATED.3IONS-SCNC: 10 MMOL/L (ref 3–14)
ANION GAP SERPL CALCULATED.3IONS-SCNC: 11 MMOL/L (ref 3–14)
ANION GAP SERPL CALCULATED.3IONS-SCNC: 4 MMOL/L (ref 3–14)
ANION GAP SERPL CALCULATED.3IONS-SCNC: 5 MMOL/L (ref 3–14)
ANION GAP SERPL CALCULATED.3IONS-SCNC: 6 MMOL/L (ref 3–14)
ANION GAP SERPL CALCULATED.3IONS-SCNC: 8 MMOL/L (ref 3–14)
ANION GAP SERPL CALCULATED.3IONS-SCNC: 9 MMOL/L (ref 3–14)
ANION GAP SERPL CALCULATED.3IONS-SCNC: 9 MMOL/L (ref 3–14)
APAP SERPL-MCNC: <2 MG/L (ref 10–20)
APPEARANCE UR: CLEAR
AST SERPL W P-5'-P-CCNC: 1225 U/L (ref 0–45)
AST SERPL W P-5'-P-CCNC: 245 U/L (ref 0–45)
AST SERPL W P-5'-P-CCNC: 32 U/L (ref 0–45)
AST SERPL W P-5'-P-CCNC: 53 U/L (ref 0–45)
AST SERPL W P-5'-P-CCNC: 749 U/L (ref 0–45)
AST SERPL W P-5'-P-CCNC: 76 U/L (ref 0–45)
AST SERPL W P-5'-P-CCNC: 98 U/L (ref 0–45)
ATROPINE: NEGATIVE
BACTERIA SPEC CULT: ABNORMAL
BACTERIA SPEC CULT: ABNORMAL
BACTERIA SPEC CULT: NO GROWTH
BARBITAL QUAL: NEGATIVE
BASE DEFICIT BLDA-SCNC: 0.6 MMOL/L
BASE DEFICIT BLDA-SCNC: 0.9 MMOL/L
BASE DEFICIT BLDA-SCNC: 5.1 MMOL/L
BASE EXCESS BLDA CALC-SCNC: 1.7 MMOL/L
BASE EXCESS BLDV CALC-SCNC: 0.1 MMOL/L
BASOPHILS # BLD AUTO: 0 10E9/L (ref 0–0.2)
BASOPHILS NFR BLD AUTO: 0.1 %
BASOPHILS NFR BLD AUTO: 0.2 %
BASOPHILS NFR BLD AUTO: 0.2 %
BENZODIAZ UR QL: NEGATIVE
BILIRUB SERPL-MCNC: 0.4 MG/DL (ref 0.2–1.3)
BILIRUB SERPL-MCNC: 0.5 MG/DL (ref 0.2–1.3)
BILIRUB SERPL-MCNC: 0.6 MG/DL (ref 0.2–1.3)
BILIRUB SERPL-MCNC: 0.6 MG/DL (ref 0.2–1.3)
BILIRUB SERPL-MCNC: 0.7 MG/DL (ref 0.2–1.3)
BILIRUB SERPL-MCNC: 0.7 MG/DL (ref 0.2–1.3)
BILIRUB SERPL-MCNC: 0.8 MG/DL (ref 0.2–1.3)
BILIRUB UR QL STRIP: NEGATIVE
BUN SERPL-MCNC: 10 MG/DL (ref 7–30)
BUN SERPL-MCNC: 11 MG/DL (ref 7–30)
BUN SERPL-MCNC: 13 MG/DL (ref 7–30)
BUN SERPL-MCNC: 16 MG/DL (ref 7–30)
BUN SERPL-MCNC: 19 MG/DL (ref 7–30)
BUN SERPL-MCNC: 20 MG/DL (ref 7–30)
BUN SERPL-MCNC: 24 MG/DL (ref 7–30)
BUN SERPL-MCNC: 32 MG/DL (ref 7–30)
BUN SERPL-MCNC: 34 MG/DL (ref 7–30)
BUTABARBITAL QUAL: NEGATIVE
BUTALBITAL QUAL: NEGATIVE
CAFFEINE QUAL: NEGATIVE
CAFFEINE QUAL: NEGATIVE
CALCIUM SERPL-MCNC: 7.3 MG/DL (ref 8.5–10.1)
CALCIUM SERPL-MCNC: 7.4 MG/DL (ref 8.5–10.1)
CALCIUM SERPL-MCNC: 7.8 MG/DL (ref 8.5–10.1)
CALCIUM SERPL-MCNC: 7.9 MG/DL (ref 8.5–10.1)
CALCIUM SERPL-MCNC: 8.1 MG/DL (ref 8.5–10.1)
CALCIUM SERPL-MCNC: 8.1 MG/DL (ref 8.5–10.1)
CALCIUM SERPL-MCNC: 8.2 MG/DL (ref 8.5–10.1)
CALCIUM SERPL-MCNC: 8.2 MG/DL (ref 8.5–10.1)
CALCIUM SERPL-MCNC: 8.3 MG/DL (ref 8.5–10.1)
CANNABINOIDS UR QL SCN: NEGATIVE
CARBAMAZEPINE QUAL: NEGATIVE
CARBAMAZEPINE QUAL: NEGATIVE
CARISOPRODOL QUAL: NEGATIVE
CHLORIDE SERPL-SCNC: 104 MMOL/L (ref 94–109)
CHLORIDE SERPL-SCNC: 108 MMOL/L (ref 94–109)
CHLORIDE SERPL-SCNC: 109 MMOL/L (ref 94–109)
CHLORIDE SERPL-SCNC: 109 MMOL/L (ref 94–109)
CHLORIDE SERPL-SCNC: 110 MMOL/L (ref 94–109)
CHLORIDE SERPL-SCNC: 113 MMOL/L (ref 94–109)
CHLORIDE SERPL-SCNC: 113 MMOL/L (ref 94–109)
CHLORIDE SERPL-SCNC: 114 MMOL/L (ref 94–109)
CHLORIDE SERPL-SCNC: 117 MMOL/L (ref 94–109)
CHLORPHENIRAMINE: NEGATIVE
CHLORPROMAZINE: NEGATIVE
CHLORPROPAMIDE UR-MCNC: NEGATIVE UG/ML
CITALOPRAM QUAL: NEGATIVE
CK SERPL-CCNC: 559 U/L (ref 30–300)
CLOMIPRAMINE QUAL: NEGATIVE
CO2 SERPL-SCNC: 23 MMOL/L (ref 20–32)
CO2 SERPL-SCNC: 23 MMOL/L (ref 20–32)
CO2 SERPL-SCNC: 24 MMOL/L (ref 20–32)
CO2 SERPL-SCNC: 25 MMOL/L (ref 20–32)
CO2 SERPL-SCNC: 25 MMOL/L (ref 20–32)
CO2 SERPL-SCNC: 26 MMOL/L (ref 20–32)
CO2 SERPL-SCNC: 26 MMOL/L (ref 20–32)
CO2 SERPL-SCNC: 28 MMOL/L (ref 20–32)
CO2 SERPL-SCNC: 31 MMOL/L (ref 20–32)
COCAINE QUAL: POSITIVE
COCAINE UR QL: POSITIVE
CODEINE QUAL: NEGATIVE
COLOR UR AUTO: YELLOW
CREAT SERPL-MCNC: 0.52 MG/DL (ref 0.66–1.25)
CREAT SERPL-MCNC: 0.53 MG/DL (ref 0.66–1.25)
CREAT SERPL-MCNC: 0.55 MG/DL (ref 0.66–1.25)
CREAT SERPL-MCNC: 0.55 MG/DL (ref 0.66–1.25)
CREAT SERPL-MCNC: 0.6 MG/DL (ref 0.66–1.25)
CREAT SERPL-MCNC: 0.61 MG/DL (ref 0.66–1.25)
CREAT SERPL-MCNC: 0.61 MG/DL (ref 0.66–1.25)
CREAT SERPL-MCNC: 0.89 MG/DL (ref 0.66–1.25)
CREAT SERPL-MCNC: 1.78 MG/DL (ref 0.66–1.25)
CREAT SERPL-MCNC: 1.94 MG/DL (ref 0.66–1.25)
CREAT SERPL-MCNC: NORMAL MG/DL (ref 0.66–1.25)
CREAT UR-MCNC: 76 MG/DL
DESIPRAMINE QUAL: NEGATIVE
DEXTROMETHORPHAN: NEGATIVE
DIFFERENTIAL METHOD BLD: ABNORMAL
DIPHENHYDRAMINE: NEGATIVE
DOXEPIN/METABOLITE: NEGATIVE
DOXYLAMINE: NEGATIVE
DRUGS SERPL SCN: NEGATIVE
EOSINOPHIL # BLD AUTO: 0 10E9/L (ref 0–0.7)
EOSINOPHIL # BLD AUTO: 0.1 10E9/L (ref 0–0.7)
EOSINOPHIL # BLD AUTO: 0.2 10E9/L (ref 0–0.7)
EOSINOPHIL NFR BLD AUTO: 0 %
EOSINOPHIL NFR BLD AUTO: 0 %
EOSINOPHIL NFR BLD AUTO: 0.2 %
EOSINOPHIL NFR BLD AUTO: 0.6 %
EOSINOPHIL NFR BLD AUTO: 1.1 %
EPHEDRINE OR PSEUDO: NEGATIVE
ERYTHROCYTE [DISTWIDTH] IN BLOOD BY AUTOMATED COUNT: 15.2 % (ref 10–15)
ERYTHROCYTE [DISTWIDTH] IN BLOOD BY AUTOMATED COUNT: 15.3 % (ref 10–15)
ERYTHROCYTE [DISTWIDTH] IN BLOOD BY AUTOMATED COUNT: 15.3 % (ref 10–15)
ERYTHROCYTE [DISTWIDTH] IN BLOOD BY AUTOMATED COUNT: 15.4 % (ref 10–15)
ERYTHROCYTE [DISTWIDTH] IN BLOOD BY AUTOMATED COUNT: 15.4 % (ref 10–15)
ERYTHROCYTE [DISTWIDTH] IN BLOOD BY AUTOMATED COUNT: 15.5 % (ref 10–15)
ERYTHROCYTE [DISTWIDTH] IN BLOOD BY AUTOMATED COUNT: 15.5 % (ref 10–15)
ETHCLORVYNOL QUAL: NEGATIVE
ETHINAMATE QUAL: NEGATIVE
ETHOSUXIMIDE QUAL: NEGATIVE
ETHOTOIN QUAL: NEGATIVE
FENTANYL QUAL: NEGATIVE
FLUAV+FLUBV AG SPEC QL: NEGATIVE
FLUAV+FLUBV AG SPEC QL: POSITIVE
FLUOXETINE AND METAB: NEGATIVE
FRACT EXCRET NA UR+SERPL-RTO: 1 %
GFR SERPL CREATININE-BSD FRML MDRD: 35 ML/MIN/1.7M2
GFR SERPL CREATININE-BSD FRML MDRD: 38 ML/MIN/1.7M2
GFR SERPL CREATININE-BSD FRML MDRD: 86 ML/MIN/1.7M2
GFR SERPL CREATININE-BSD FRML MDRD: >90 ML/MIN/1.7M2
GFR SERPL CREATININE-BSD FRML MDRD: NORMAL ML/MIN/1.7M2
GLUCOSE BLDC GLUCOMTR-MCNC: 101 MG/DL (ref 70–99)
GLUCOSE BLDC GLUCOMTR-MCNC: 101 MG/DL (ref 70–99)
GLUCOSE BLDC GLUCOMTR-MCNC: 103 MG/DL (ref 70–99)
GLUCOSE BLDC GLUCOMTR-MCNC: 103 MG/DL (ref 70–99)
GLUCOSE BLDC GLUCOMTR-MCNC: 106 MG/DL (ref 70–99)
GLUCOSE BLDC GLUCOMTR-MCNC: 109 MG/DL (ref 70–99)
GLUCOSE BLDC GLUCOMTR-MCNC: 111 MG/DL (ref 70–99)
GLUCOSE BLDC GLUCOMTR-MCNC: 113 MG/DL (ref 70–99)
GLUCOSE BLDC GLUCOMTR-MCNC: 115 MG/DL (ref 70–99)
GLUCOSE BLDC GLUCOMTR-MCNC: 117 MG/DL (ref 70–99)
GLUCOSE BLDC GLUCOMTR-MCNC: 119 MG/DL (ref 70–99)
GLUCOSE BLDC GLUCOMTR-MCNC: 121 MG/DL (ref 70–99)
GLUCOSE BLDC GLUCOMTR-MCNC: 122 MG/DL (ref 70–99)
GLUCOSE BLDC GLUCOMTR-MCNC: 122 MG/DL (ref 70–99)
GLUCOSE BLDC GLUCOMTR-MCNC: 123 MG/DL (ref 70–99)
GLUCOSE BLDC GLUCOMTR-MCNC: 124 MG/DL (ref 70–99)
GLUCOSE BLDC GLUCOMTR-MCNC: 124 MG/DL (ref 70–99)
GLUCOSE BLDC GLUCOMTR-MCNC: 125 MG/DL (ref 70–99)
GLUCOSE BLDC GLUCOMTR-MCNC: 125 MG/DL (ref 70–99)
GLUCOSE BLDC GLUCOMTR-MCNC: 127 MG/DL (ref 70–99)
GLUCOSE BLDC GLUCOMTR-MCNC: 127 MG/DL (ref 70–99)
GLUCOSE BLDC GLUCOMTR-MCNC: 135 MG/DL (ref 70–99)
GLUCOSE BLDC GLUCOMTR-MCNC: 137 MG/DL (ref 70–99)
GLUCOSE BLDC GLUCOMTR-MCNC: 139 MG/DL (ref 70–99)
GLUCOSE BLDC GLUCOMTR-MCNC: 143 MG/DL (ref 70–99)
GLUCOSE BLDC GLUCOMTR-MCNC: 143 MG/DL (ref 70–99)
GLUCOSE BLDC GLUCOMTR-MCNC: 146 MG/DL (ref 70–99)
GLUCOSE BLDC GLUCOMTR-MCNC: 78 MG/DL (ref 70–99)
GLUCOSE BLDC GLUCOMTR-MCNC: 83 MG/DL (ref 70–99)
GLUCOSE BLDC GLUCOMTR-MCNC: 85 MG/DL (ref 70–99)
GLUCOSE BLDC GLUCOMTR-MCNC: 86 MG/DL (ref 70–99)
GLUCOSE BLDC GLUCOMTR-MCNC: 88 MG/DL (ref 70–99)
GLUCOSE BLDC GLUCOMTR-MCNC: 90 MG/DL (ref 70–99)
GLUCOSE BLDC GLUCOMTR-MCNC: 91 MG/DL (ref 70–99)
GLUCOSE BLDC GLUCOMTR-MCNC: 93 MG/DL (ref 70–99)
GLUCOSE BLDC GLUCOMTR-MCNC: 94 MG/DL (ref 70–99)
GLUCOSE BLDC GLUCOMTR-MCNC: 95 MG/DL (ref 70–99)
GLUCOSE BLDC GLUCOMTR-MCNC: 96 MG/DL (ref 70–99)
GLUCOSE BLDC GLUCOMTR-MCNC: 96 MG/DL (ref 70–99)
GLUCOSE BLDC GLUCOMTR-MCNC: 97 MG/DL (ref 70–99)
GLUCOSE BLDC GLUCOMTR-MCNC: 98 MG/DL (ref 70–99)
GLUCOSE SERPL-MCNC: 100 MG/DL (ref 70–99)
GLUCOSE SERPL-MCNC: 100 MG/DL (ref 70–99)
GLUCOSE SERPL-MCNC: 101 MG/DL (ref 70–99)
GLUCOSE SERPL-MCNC: 105 MG/DL (ref 70–99)
GLUCOSE SERPL-MCNC: 111 MG/DL (ref 70–99)
GLUCOSE SERPL-MCNC: 131 MG/DL (ref 70–99)
GLUCOSE SERPL-MCNC: 165 MG/DL (ref 70–99)
GLUCOSE SERPL-MCNC: 89 MG/DL (ref 70–99)
GLUCOSE SERPL-MCNC: 92 MG/DL (ref 70–99)
GLUCOSE UR STRIP-MCNC: NEGATIVE MG/DL
GLUTETHIMIDE QUAL: NEGATIVE
HBA1C MFR BLD: 5.6 % (ref 4.3–6)
HCO3 BLD-SCNC: 22 MMOL/L (ref 21–28)
HCO3 BLD-SCNC: 22 MMOL/L (ref 21–28)
HCO3 BLD-SCNC: 24 MMOL/L (ref 21–28)
HCO3 BLD-SCNC: 26 MMOL/L (ref 21–28)
HCO3 BLDV-SCNC: 27 MMOL/L (ref 21–28)
HCT VFR BLD AUTO: 39.5 % (ref 40–53)
HCT VFR BLD AUTO: 40.9 % (ref 40–53)
HCT VFR BLD AUTO: 41.2 % (ref 40–53)
HCT VFR BLD AUTO: 42 % (ref 40–53)
HCT VFR BLD AUTO: 43.3 % (ref 40–53)
HCT VFR BLD AUTO: 43.6 % (ref 40–53)
HCT VFR BLD AUTO: 49.3 % (ref 40–53)
HGB BLD-MCNC: 13.7 G/DL (ref 13.3–17.7)
HGB BLD-MCNC: 14.3 G/DL (ref 13.3–17.7)
HGB BLD-MCNC: 14.3 G/DL (ref 13.3–17.7)
HGB BLD-MCNC: 15 G/DL (ref 13.3–17.7)
HGB BLD-MCNC: 15.2 G/DL (ref 13.3–17.7)
HGB BLD-MCNC: 15.3 G/DL (ref 13.3–17.7)
HGB BLD-MCNC: 17.1 G/DL (ref 13.3–17.7)
HGB UR QL STRIP: NEGATIVE
HYDROCODONE QUAL: NEGATIVE
HYDROMORPHONE QUAL: NEGATIVE
IBUPROFEN QUAL: NEGATIVE
IBUPROFEN QUAL: NEGATIVE
IMIPRAMINE QUAL: NEGATIVE
IMM GRANULOCYTES # BLD: 0.1 10E9/L (ref 0–0.4)
IMM GRANULOCYTES NFR BLD: 0.3 %
IMM GRANULOCYTES NFR BLD: 0.4 %
IMM GRANULOCYTES NFR BLD: 0.4 %
IMM GRANULOCYTES NFR BLD: 0.5 %
IMM GRANULOCYTES NFR BLD: 0.6 %
INR PPP: 1.08 (ref 0.86–1.14)
INR PPP: 1.17 (ref 0.86–1.14)
INR PPP: 1.42 (ref 0.86–1.14)
INTERPRETATION ECG - MUSE: NORMAL
KETONES UR STRIP-MCNC: NEGATIVE MG/DL
L PNEUMO1 AG UR QL IA: NORMAL
LACTATE BLD-SCNC: 0.9 MMOL/L (ref 0.7–2)
LACTATE BLD-SCNC: 1 MMOL/L (ref 0.7–2)
LACTATE BLD-SCNC: 1 MMOL/L (ref 0.7–2)
LACTATE BLD-SCNC: 2.6 MMOL/L (ref 0.7–2)
LACTATE BLD-SCNC: 2.6 MMOL/L (ref 0.7–2)
LACTATE SERPL-SCNC: 2.1 MMOL/L (ref 0.4–2)
LAMOTRIGINE QUAL: NEGATIVE
LEUKOCYTE ESTERASE UR QL STRIP: NEGATIVE
LIPASE SERPL-CCNC: 48 U/L (ref 73–393)
LOXAPINE: NEGATIVE
LYMPHOCYTES # BLD AUTO: 0.9 10E9/L (ref 0.8–5.3)
LYMPHOCYTES # BLD AUTO: 1.2 10E9/L (ref 0.8–5.3)
LYMPHOCYTES # BLD AUTO: 1.6 10E9/L (ref 0.8–5.3)
LYMPHOCYTES # BLD AUTO: 2.4 10E9/L (ref 0.8–5.3)
LYMPHOCYTES # BLD AUTO: 2.9 10E9/L (ref 0.8–5.3)
LYMPHOCYTES NFR BLD AUTO: 11.5 %
LYMPHOCYTES NFR BLD AUTO: 15 %
LYMPHOCYTES NFR BLD AUTO: 15 %
LYMPHOCYTES NFR BLD AUTO: 4.7 %
LYMPHOCYTES NFR BLD AUTO: 6.8 %
Lab: NORMAL
MAGNESIUM SERPL-MCNC: 2.1 MG/DL (ref 1.6–2.3)
MAGNESIUM SERPL-MCNC: 2.2 MG/DL (ref 1.6–2.3)
MAGNESIUM SERPL-MCNC: 2.3 MG/DL (ref 1.6–2.3)
MAGNESIUM SERPL-MCNC: NORMAL MG/DL (ref 1.6–2.3)
MAPROTYLINE: NEGATIVE
MCH RBC QN AUTO: 31.2 PG (ref 26.5–33)
MCH RBC QN AUTO: 31.3 PG (ref 26.5–33)
MCH RBC QN AUTO: 31.7 PG (ref 26.5–33)
MCH RBC QN AUTO: 31.8 PG (ref 26.5–33)
MCHC RBC AUTO-ENTMCNC: 34.6 G/DL (ref 31.5–36.5)
MCHC RBC AUTO-ENTMCNC: 34.7 G/DL (ref 31.5–36.5)
MCHC RBC AUTO-ENTMCNC: 35 G/DL (ref 31.5–36.5)
MCHC RBC AUTO-ENTMCNC: 35.1 G/DL (ref 31.5–36.5)
MCHC RBC AUTO-ENTMCNC: 36.2 G/DL (ref 31.5–36.5)
MCV RBC AUTO: 87 FL (ref 78–100)
MCV RBC AUTO: 89 FL (ref 78–100)
MCV RBC AUTO: 89 FL (ref 78–100)
MCV RBC AUTO: 90 FL (ref 78–100)
MCV RBC AUTO: 90 FL (ref 78–100)
MCV RBC AUTO: 92 FL (ref 78–100)
MCV RBC AUTO: 92 FL (ref 78–100)
MDMA QUAL: NEGATIVE
MEPERIDINE QUAL: NEGATIVE
MEPHENYTOIN QUAL: NEGATIVE
MEPHOBARBITAL QUAL: NEGATIVE
MEPROBAMATE QUAL: NEGATIVE
METHAMPHETAMINE: NEGATIVE
METHAQUALONE QUAL: NEGATIVE
METHARBITAL QUAL: NEGATIVE
METHODONE QUAL: POSITIVE
METHSUXIMIDE QUAL: NEGATIVE
METHYPRYLON QUAL: NEGATIVE
MONOCYTES # BLD AUTO: 1.2 10E9/L (ref 0–1.3)
MONOCYTES # BLD AUTO: 1.6 10E9/L (ref 0–1.3)
MONOCYTES # BLD AUTO: 1.7 10E9/L (ref 0–1.3)
MONOCYTES # BLD AUTO: 1.9 10E9/L (ref 0–1.3)
MONOCYTES # BLD AUTO: 2 10E9/L (ref 0–1.3)
MONOCYTES NFR BLD AUTO: 10.3 %
MONOCYTES NFR BLD AUTO: 10.6 %
MONOCYTES NFR BLD AUTO: 14.1 %
MONOCYTES NFR BLD AUTO: 6.6 %
MONOCYTES NFR BLD AUTO: 8.9 %
MORPHINE QUAL: NEGATIVE
MRSA DNA SPEC QL NAA+PROBE: NEGATIVE
MUCOUS THREADS #/AREA URNS LPF: PRESENT /LPF
NEUTROPHILS # BLD AUTO: 10 10E9/L (ref 1.6–8.3)
NEUTROPHILS # BLD AUTO: 11.5 10E9/L (ref 1.6–8.3)
NEUTROPHILS # BLD AUTO: 14.6 10E9/L (ref 1.6–8.3)
NEUTROPHILS # BLD AUTO: 15.6 10E9/L (ref 1.6–8.3)
NEUTROPHILS # BLD AUTO: 16 10E9/L (ref 1.6–8.3)
NEUTROPHILS NFR BLD AUTO: 73.1 %
NEUTROPHILS NFR BLD AUTO: 73.7 %
NEUTROPHILS NFR BLD AUTO: 74.8 %
NEUTROPHILS NFR BLD AUTO: 84.2 %
NEUTROPHILS NFR BLD AUTO: 85.9 %
NICOTINE: NEGATIVE
NITRATE UR QL: NEGATIVE
NORTRIPTYLINE QUAL: NEGATIVE
NRBC # BLD AUTO: 0 10*3/UL
NRBC BLD AUTO-RTO: 0 /100
O2/TOTAL GAS SETTING VFR VENT: 40 %
O2/TOTAL GAS SETTING VFR VENT: ABNORMAL %
O2/TOTAL GAS SETTING VFR VENT: NORMAL %
OLANZAPINE QUAL: NEGATIVE
OPIATES UR QL SCN: NEGATIVE
OSMOLALITY SERPL: 311 MMOL/KG (ref 280–301)
OXYCODONE QUAL: NEGATIVE
OXYHGB MFR BLD: 96 % (ref 92–100)
OXYHGB MFR BLD: 98 % (ref 92–100)
OXYHGB MFR BLD: 98 % (ref 92–100)
OXYHGB MFR BLD: 99 % (ref 92–100)
OXYHGB MFR BLDV: 77 %
PCO2 BLD: 30 MM HG (ref 35–45)
PCO2 BLD: 37 MM HG (ref 35–45)
PCO2 BLD: 39 MM HG (ref 35–45)
PCO2 BLD: 48 MM HG (ref 35–45)
PCO2 BLDV: 52 MM HG (ref 40–50)
PENTAZOCINE: NEGATIVE
PENTOBARBITAL QUAL: NEGATIVE
PH BLD: 7.27 PH (ref 7.35–7.45)
PH BLD: 7.4 PH (ref 7.35–7.45)
PH BLD: 7.45 PH (ref 7.35–7.45)
PH BLD: 7.47 PH (ref 7.35–7.45)
PH BLDV: 7.33 PH (ref 7.32–7.43)
PH UR STRIP: 6.5 PH (ref 5–7)
PHENACETIN QUAL: NEGATIVE
PHENCYCLIDINE QUAL: NEGATIVE
PHENMETRAZINE: NEGATIVE
PHENOBARBITAL QUAL: NEGATIVE
PHENSUXIMIDE QUAL: NEGATIVE
PHENTERMINE: NEGATIVE
PHENYLBUTAZONE: NEGATIVE
PHENYLPROPANOLAMINE: NEGATIVE
PHENYTOIN QUAL: NEGATIVE
PHOSPHATE SERPL-MCNC: 1.7 MG/DL (ref 2.5–4.5)
PHOSPHATE SERPL-MCNC: 2.3 MG/DL (ref 2.5–4.5)
PHOSPHATE SERPL-MCNC: 2.7 MG/DL (ref 2.5–4.5)
PHOSPHATE SERPL-MCNC: 2.8 MG/DL (ref 2.5–4.5)
PHOSPHATE SERPL-MCNC: NORMAL MG/DL (ref 2.5–4.5)
PLATELET # BLD AUTO: 214 10E9/L (ref 150–450)
PLATELET # BLD AUTO: 245 10E9/L (ref 150–450)
PLATELET # BLD AUTO: 247 10E9/L (ref 150–450)
PLATELET # BLD AUTO: 267 10E9/L (ref 150–450)
PLATELET # BLD AUTO: 272 10E9/L (ref 150–450)
PLATELET # BLD AUTO: 276 10E9/L (ref 150–450)
PLATELET # BLD AUTO: 283 10E9/L (ref 150–450)
PLATELET # BLD AUTO: 298 10E9/L (ref 150–450)
PLATELET # BLD AUTO: NORMAL 10E9/L (ref 150–450)
PO2 BLD: 105 MM HG (ref 80–105)
PO2 BLD: 124 MM HG (ref 80–105)
PO2 BLD: 210 MM HG (ref 80–105)
PO2 BLD: 85 MM HG (ref 80–105)
PO2 BLDV: 47 MM HG (ref 25–47)
POTASSIUM SERPL-SCNC: 2.9 MMOL/L (ref 3.4–5.3)
POTASSIUM SERPL-SCNC: 3.3 MMOL/L (ref 3.4–5.3)
POTASSIUM SERPL-SCNC: 3.4 MMOL/L (ref 3.4–5.3)
POTASSIUM SERPL-SCNC: 3.6 MMOL/L (ref 3.4–5.3)
POTASSIUM SERPL-SCNC: 3.7 MMOL/L (ref 3.4–5.3)
POTASSIUM SERPL-SCNC: 3.7 MMOL/L (ref 3.4–5.3)
POTASSIUM SERPL-SCNC: 3.8 MMOL/L (ref 3.4–5.3)
POTASSIUM SERPL-SCNC: 3.9 MMOL/L (ref 3.4–5.3)
POTASSIUM SERPL-SCNC: 4.4 MMOL/L (ref 3.4–5.3)
POTASSIUM SERPL-SCNC: 5.4 MMOL/L (ref 3.4–5.3)
PREALB SERPL IA-MCNC: 14 MG/DL (ref 15–45)
PRIMIDONE QUAL: NEGATIVE
PROCALCITONIN SERPL-MCNC: 2.44 NG/ML
PROCALCITONIN SERPL-MCNC: 2.59 NG/ML
PROPOXPHENE QUAL: NEGATIVE
PROPRANOLOL QUAL: NEGATIVE
PROT SERPL-MCNC: 6 G/DL (ref 6.8–8.8)
PROT SERPL-MCNC: 6.5 G/DL (ref 6.8–8.8)
PROT SERPL-MCNC: 6.5 G/DL (ref 6.8–8.8)
PROT SERPL-MCNC: 6.6 G/DL (ref 6.8–8.8)
PROT SERPL-MCNC: 8 G/DL (ref 6.8–8.8)
PYRILAMINE: NEGATIVE
RBC # BLD AUTO: 4.39 10E12/L (ref 4.4–5.9)
RBC # BLD AUTO: 4.58 10E12/L (ref 4.4–5.9)
RBC # BLD AUTO: 4.59 10E12/L (ref 4.4–5.9)
RBC # BLD AUTO: 4.73 10E12/L (ref 4.4–5.9)
RBC # BLD AUTO: 4.85 10E12/L (ref 4.4–5.9)
RBC # BLD AUTO: 4.9 10E12/L (ref 4.4–5.9)
RBC # BLD AUTO: 5.38 10E12/L (ref 4.4–5.9)
RBC #/AREA URNS AUTO: <1 /HPF (ref 0–2)
SALICYLATE QUAL: NEGATIVE
SALICYLATE QUAL: NEGATIVE
SECOBARBITAL QUAL: NEGATIVE
SODIUM SERPL-SCNC: 140 MMOL/L (ref 133–144)
SODIUM SERPL-SCNC: 141 MMOL/L (ref 133–144)
SODIUM SERPL-SCNC: 142 MMOL/L (ref 133–144)
SODIUM SERPL-SCNC: 143 MMOL/L (ref 133–144)
SODIUM SERPL-SCNC: 144 MMOL/L (ref 133–144)
SODIUM SERPL-SCNC: 145 MMOL/L (ref 133–144)
SODIUM SERPL-SCNC: 147 MMOL/L (ref 133–144)
SODIUM SERPL-SCNC: 148 MMOL/L (ref 133–144)
SODIUM SERPL-SCNC: 150 MMOL/L (ref 133–144)
SODIUM UR-SCNC: 62 MMOL/L
SOURCE: ABNORMAL
SP GR UR STRIP: 1.01 (ref 1–1.03)
SPECIMEN SOURCE: ABNORMAL
SPECIMEN SOURCE: ABNORMAL
SPECIMEN SOURCE: NORMAL
TALBUTAL QUAL: NEGATIVE
THEOBROMINE: NEGATIVE
THEOPHYLLINE QUAL: NEGATIVE
THIOPENTAL QUAL: NEGATIVE
TOPIRAMATE QUAL: NEGATIVE
TRIMIPRAMINE QUAL: NEGATIVE
TROPONIN I SERPL-MCNC: 0.21 UG/L (ref 0–0.04)
TROPONIN I SERPL-MCNC: 0.64 UG/L (ref 0–0.04)
TSH SERPL DL<=0.005 MIU/L-ACNC: 0.72 MU/L (ref 0.4–4)
TYBAMATE QUAL: NEGATIVE
UROBILINOGEN UR STRIP-MCNC: 2 MG/DL (ref 0–2)
VALPROIC ACID QUAL: NEGATIVE
VENLAFAXINE QUAL: NEGATIVE
WBC # BLD AUTO: 11.1 10E9/L (ref 4–11)
WBC # BLD AUTO: 13.6 10E9/L (ref 4–11)
WBC # BLD AUTO: 13.6 10E9/L (ref 4–11)
WBC # BLD AUTO: 15.8 10E9/L (ref 4–11)
WBC # BLD AUTO: 18.2 10E9/L (ref 4–11)
WBC # BLD AUTO: 19 10E9/L (ref 4–11)
WBC # BLD AUTO: 19.6 10E9/L (ref 4–11)
WBC # BLD AUTO: 20.1 10E9/L (ref 4–11)
WBC #/AREA URNS AUTO: <1 /HPF (ref 0–2)

## 2018-01-01 PROCEDURE — 85025 COMPLETE CBC W/AUTO DIFF WBC: CPT | Performed by: PHYSICIAN ASSISTANT

## 2018-01-01 PROCEDURE — 84100 ASSAY OF PHOSPHORUS: CPT | Performed by: INTERNAL MEDICINE

## 2018-01-01 PROCEDURE — 31500 INSERT EMERGENCY AIRWAY: CPT

## 2018-01-01 PROCEDURE — 25000125 ZZHC RX 250: Performed by: NURSE PRACTITIONER

## 2018-01-01 PROCEDURE — 94640 AIRWAY INHALATION TREATMENT: CPT

## 2018-01-01 PROCEDURE — 25000128 H RX IP 250 OP 636: Performed by: EMERGENCY MEDICINE

## 2018-01-01 PROCEDURE — A9270 NON-COVERED ITEM OR SERVICE: HCPCS | Mod: GY | Performed by: INTERNAL MEDICINE

## 2018-01-01 PROCEDURE — 99233 SBSQ HOSP IP/OBS HIGH 50: CPT | Performed by: INTERNAL MEDICINE

## 2018-01-01 PROCEDURE — 82805 BLOOD GASES W/O2 SATURATION: CPT | Performed by: INTERNAL MEDICINE

## 2018-01-01 PROCEDURE — 99233 SBSQ HOSP IP/OBS HIGH 50: CPT | Performed by: NURSE PRACTITIONER

## 2018-01-01 PROCEDURE — 99214 OFFICE O/P EST MOD 30 MIN: CPT | Performed by: FAMILY MEDICINE

## 2018-01-01 PROCEDURE — 87641 MR-STAPH DNA AMP PROBE: CPT | Performed by: INTERNAL MEDICINE

## 2018-01-01 PROCEDURE — 25000128 H RX IP 250 OP 636: Performed by: INTERNAL MEDICINE

## 2018-01-01 PROCEDURE — 25000132 ZZH RX MED GY IP 250 OP 250 PS 637: Mod: GY | Performed by: INTERNAL MEDICINE

## 2018-01-01 PROCEDURE — 83690 ASSAY OF LIPASE: CPT | Performed by: INTERNAL MEDICINE

## 2018-01-01 PROCEDURE — 94003 VENT MGMT INPAT SUBQ DAY: CPT

## 2018-01-01 PROCEDURE — 25000125 ZZHC RX 250: Performed by: INTERNAL MEDICINE

## 2018-01-01 PROCEDURE — 87077 CULTURE AEROBIC IDENTIFY: CPT | Performed by: EMERGENCY MEDICINE

## 2018-01-01 PROCEDURE — A9585 GADOBUTROL INJECTION: HCPCS | Performed by: INTERNAL MEDICINE

## 2018-01-01 PROCEDURE — 83605 ASSAY OF LACTIC ACID: CPT | Performed by: INTERNAL MEDICINE

## 2018-01-01 PROCEDURE — 93005 ELECTROCARDIOGRAM TRACING: CPT

## 2018-01-01 PROCEDURE — 87181 SC STD AGAR DILUTION PER AGT: CPT | Performed by: EMERGENCY MEDICINE

## 2018-01-01 PROCEDURE — 40000275 ZZH STATISTIC RCP TIME EA 10 MIN

## 2018-01-01 PROCEDURE — 36415 COLL VENOUS BLD VENIPUNCTURE: CPT | Performed by: INTERNAL MEDICINE

## 2018-01-01 PROCEDURE — 40000986 XR CHEST PORT 1 VW

## 2018-01-01 PROCEDURE — 20000003 ZZH R&B ICU

## 2018-01-01 PROCEDURE — 27210995 ZZH RX 272: Performed by: INTERNAL MEDICINE

## 2018-01-01 PROCEDURE — 00000146 ZZHCL STATISTIC GLUCOSE BY METER IP

## 2018-01-01 PROCEDURE — 99223 1ST HOSP IP/OBS HIGH 75: CPT | Performed by: PHYSICIAN ASSISTANT

## 2018-01-01 PROCEDURE — 36600 WITHDRAWAL OF ARTERIAL BLOOD: CPT

## 2018-01-01 PROCEDURE — 84300 ASSAY OF URINE SODIUM: CPT | Performed by: INTERNAL MEDICINE

## 2018-01-01 PROCEDURE — 80307 DRUG TEST PRSMV CHEM ANLYZR: CPT | Performed by: INTERNAL MEDICINE

## 2018-01-01 PROCEDURE — 99221 1ST HOSP IP/OBS SF/LOW 40: CPT | Performed by: PSYCHIATRY & NEUROLOGY

## 2018-01-01 PROCEDURE — 99291 CRITICAL CARE FIRST HOUR: CPT | Performed by: INTERNAL MEDICINE

## 2018-01-01 PROCEDURE — 82570 ASSAY OF URINE CREATININE: CPT | Performed by: INTERNAL MEDICINE

## 2018-01-01 PROCEDURE — 83605 ASSAY OF LACTIC ACID: CPT | Performed by: EMERGENCY MEDICINE

## 2018-01-01 PROCEDURE — 12000000 ZZH R&B MED SURG/OB

## 2018-01-01 PROCEDURE — 85610 PROTHROMBIN TIME: CPT | Performed by: EMERGENCY MEDICINE

## 2018-01-01 PROCEDURE — 83735 ASSAY OF MAGNESIUM: CPT | Performed by: INTERNAL MEDICINE

## 2018-01-01 PROCEDURE — 25000125 ZZHC RX 250: Performed by: RADIOLOGY

## 2018-01-01 PROCEDURE — 85049 AUTOMATED PLATELET COUNT: CPT | Performed by: INTERNAL MEDICINE

## 2018-01-01 PROCEDURE — 40000008 ZZH STATISTIC AIRWAY CARE

## 2018-01-01 PROCEDURE — 85610 PROTHROMBIN TIME: CPT | Performed by: INTERNAL MEDICINE

## 2018-01-01 PROCEDURE — 87070 CULTURE OTHR SPECIMN AEROBIC: CPT | Performed by: EMERGENCY MEDICINE

## 2018-01-01 PROCEDURE — 25000128 H RX IP 250 OP 636: Performed by: PHYSICIAN ASSISTANT

## 2018-01-01 PROCEDURE — 87040 BLOOD CULTURE FOR BACTERIA: CPT | Performed by: INTERNAL MEDICINE

## 2018-01-01 PROCEDURE — 85027 COMPLETE CBC AUTOMATED: CPT | Performed by: INTERNAL MEDICINE

## 2018-01-01 PROCEDURE — 87804 INFLUENZA ASSAY W/OPTIC: CPT | Performed by: FAMILY MEDICINE

## 2018-01-01 PROCEDURE — 72156 MRI NECK SPINE W/O & W/DYE: CPT

## 2018-01-01 PROCEDURE — 80053 COMPREHEN METABOLIC PANEL: CPT | Performed by: INTERNAL MEDICINE

## 2018-01-01 PROCEDURE — 84484 ASSAY OF TROPONIN QUANT: CPT | Performed by: INTERNAL MEDICINE

## 2018-01-01 PROCEDURE — 85025 COMPLETE CBC W/AUTO DIFF WBC: CPT | Performed by: INTERNAL MEDICINE

## 2018-01-01 PROCEDURE — 83036 HEMOGLOBIN GLYCOSYLATED A1C: CPT | Performed by: INTERNAL MEDICINE

## 2018-01-01 PROCEDURE — 40000225 ZZH STATISTIC SLP WARD VISIT: Performed by: SPEECH-LANGUAGE PATHOLOGIST

## 2018-01-01 PROCEDURE — 84132 ASSAY OF SERUM POTASSIUM: CPT | Performed by: INTERNAL MEDICINE

## 2018-01-01 PROCEDURE — 71045 X-RAY EXAM CHEST 1 VIEW: CPT | Mod: 76

## 2018-01-01 PROCEDURE — 40000281 ZZH STATISTIC TRANSPORT TIME EA 15 MIN

## 2018-01-01 PROCEDURE — 99223 1ST HOSP IP/OBS HIGH 75: CPT | Performed by: NURSE PRACTITIONER

## 2018-01-01 PROCEDURE — 80329 ANALGESICS NON-OPIOID 1 OR 2: CPT | Performed by: EMERGENCY MEDICINE

## 2018-01-01 PROCEDURE — 71045 X-RAY EXAM CHEST 1 VIEW: CPT

## 2018-01-01 PROCEDURE — 94002 VENT MGMT INPAT INIT DAY: CPT

## 2018-01-01 PROCEDURE — 99232 SBSQ HOSP IP/OBS MODERATE 35: CPT | Performed by: PSYCHIATRY & NEUROLOGY

## 2018-01-01 PROCEDURE — 80048 BASIC METABOLIC PNL TOTAL CA: CPT | Performed by: INTERNAL MEDICINE

## 2018-01-01 PROCEDURE — 27210429 ZZH NUTRITION PRODUCT INTERMEDIATE LITER

## 2018-01-01 PROCEDURE — 96366 THER/PROPH/DIAG IV INF ADDON: CPT

## 2018-01-01 PROCEDURE — 99232 SBSQ HOSP IP/OBS MODERATE 35: CPT | Performed by: INTERNAL MEDICINE

## 2018-01-01 PROCEDURE — 87640 STAPH A DNA AMP PROBE: CPT | Performed by: INTERNAL MEDICINE

## 2018-01-01 PROCEDURE — 94640 AIRWAY INHALATION TREATMENT: CPT | Mod: 76

## 2018-01-01 PROCEDURE — 40000358 ZZHCL STATISTIC DRUG SCREEN MULTIPLE (METRO): Performed by: INTERNAL MEDICINE

## 2018-01-01 PROCEDURE — 96368 THER/DIAG CONCURRENT INF: CPT

## 2018-01-01 PROCEDURE — 82140 ASSAY OF AMMONIA: CPT | Performed by: INTERNAL MEDICINE

## 2018-01-01 PROCEDURE — 83930 ASSAY OF BLOOD OSMOLALITY: CPT | Performed by: INTERNAL MEDICINE

## 2018-01-01 PROCEDURE — 82550 ASSAY OF CK (CPK): CPT | Performed by: INTERNAL MEDICINE

## 2018-01-01 PROCEDURE — 87086 URINE CULTURE/COLONY COUNT: CPT | Performed by: EMERGENCY MEDICINE

## 2018-01-01 PROCEDURE — 84145 PROCALCITONIN (PCT): CPT | Performed by: INTERNAL MEDICINE

## 2018-01-01 PROCEDURE — 84134 ASSAY OF PREALBUMIN: CPT | Performed by: INTERNAL MEDICINE

## 2018-01-01 PROCEDURE — 93306 TTE W/DOPPLER COMPLETE: CPT | Mod: 26 | Performed by: INTERNAL MEDICINE

## 2018-01-01 PROCEDURE — 82565 ASSAY OF CREATININE: CPT | Performed by: INTERNAL MEDICINE

## 2018-01-01 PROCEDURE — 80053 COMPREHEN METABOLIC PANEL: CPT | Performed by: EMERGENCY MEDICINE

## 2018-01-01 PROCEDURE — 25000131 ZZH RX MED GY IP 250 OP 636 PS 637: Mod: GY | Performed by: INTERNAL MEDICINE

## 2018-01-01 PROCEDURE — 25500064 ZZH RX 255 OP 636: Performed by: INTERNAL MEDICINE

## 2018-01-01 PROCEDURE — 99285 EMERGENCY DEPT VISIT HI MDM: CPT | Mod: 25

## 2018-01-01 PROCEDURE — 85025 COMPLETE CBC W/AUTO DIFF WBC: CPT | Performed by: EMERGENCY MEDICINE

## 2018-01-01 PROCEDURE — 40000893 ZZH STATISTIC PT IP EVAL DEFER

## 2018-01-01 PROCEDURE — 93306 TTE W/DOPPLER COMPLETE: CPT

## 2018-01-01 PROCEDURE — 36415 COLL VENOUS BLD VENIPUNCTURE: CPT | Performed by: PHYSICIAN ASSISTANT

## 2018-01-01 PROCEDURE — 87185 SC STD ENZYME DETCJ PER NZM: CPT | Performed by: EMERGENCY MEDICINE

## 2018-01-01 PROCEDURE — 99239 HOSP IP/OBS DSCHRG MGMT >30: CPT | Performed by: INTERNAL MEDICINE

## 2018-01-01 PROCEDURE — 96375 TX/PRO/DX INJ NEW DRUG ADDON: CPT

## 2018-01-01 PROCEDURE — 70450 CT HEAD/BRAIN W/O DYE: CPT

## 2018-01-01 PROCEDURE — 74340 X-RAY GUIDE FOR GI TUBE: CPT

## 2018-01-01 PROCEDURE — 70553 MRI BRAIN STEM W/O & W/DYE: CPT

## 2018-01-01 PROCEDURE — 87899 AGENT NOS ASSAY W/OPTIC: CPT | Performed by: INTERNAL MEDICINE

## 2018-01-01 PROCEDURE — 99207 ZZC CDG-CHARGE REQUIRED MANUAL ENTRY: CPT | Performed by: PHYSICIAN ASSISTANT

## 2018-01-01 PROCEDURE — 85048 AUTOMATED LEUKOCYTE COUNT: CPT | Performed by: INTERNAL MEDICINE

## 2018-01-01 PROCEDURE — 84443 ASSAY THYROID STIM HORMONE: CPT | Performed by: INTERNAL MEDICINE

## 2018-01-01 PROCEDURE — 25000125 ZZHC RX 250: Performed by: EMERGENCY MEDICINE

## 2018-01-01 PROCEDURE — 80053 COMPREHEN METABOLIC PANEL: CPT | Performed by: PHYSICIAN ASSISTANT

## 2018-01-01 PROCEDURE — 81001 URINALYSIS AUTO W/SCOPE: CPT | Performed by: EMERGENCY MEDICINE

## 2018-01-01 PROCEDURE — 93010 ELECTROCARDIOGRAM REPORT: CPT | Performed by: INTERNAL MEDICINE

## 2018-01-01 PROCEDURE — 92610 EVALUATE SWALLOWING FUNCTION: CPT | Mod: GN | Performed by: SPEECH-LANGUAGE PATHOLOGIST

## 2018-01-01 PROCEDURE — 25000132 ZZH RX MED GY IP 250 OP 250 PS 637: Mod: GY | Performed by: NURSE PRACTITIONER

## 2018-01-01 PROCEDURE — A9270 NON-COVERED ITEM OR SERVICE: HCPCS | Performed by: INTERNAL MEDICINE

## 2018-01-01 PROCEDURE — 96365 THER/PROPH/DIAG IV INF INIT: CPT

## 2018-01-01 PROCEDURE — 82805 BLOOD GASES W/O2 SATURATION: CPT | Performed by: EMERGENCY MEDICINE

## 2018-01-01 PROCEDURE — 87040 BLOOD CULTURE FOR BACTERIA: CPT | Performed by: EMERGENCY MEDICINE

## 2018-01-01 PROCEDURE — 87804 INFLUENZA ASSAY W/OPTIC: CPT | Performed by: EMERGENCY MEDICINE

## 2018-01-01 PROCEDURE — 5A1945Z RESPIRATORY VENTILATION, 24-96 CONSECUTIVE HOURS: ICD-10-PCS | Performed by: EMERGENCY MEDICINE

## 2018-01-01 RX ORDER — FENTANYL CITRATE 50 UG/ML
50-100 INJECTION, SOLUTION INTRAMUSCULAR; INTRAVENOUS
Status: DISCONTINUED | OUTPATIENT
Start: 2018-01-01 | End: 2018-01-01

## 2018-01-01 RX ORDER — DULOXETIN HYDROCHLORIDE 30 MG/1
30 CAPSULE, DELAYED RELEASE ORAL 2 TIMES DAILY
Status: DISCONTINUED | OUTPATIENT
Start: 2018-01-01 | End: 2018-01-01 | Stop reason: HOSPADM

## 2018-01-01 RX ORDER — CARVEDILOL 12.5 MG/1
12.5 TABLET ORAL 2 TIMES DAILY WITH MEALS
Status: DISCONTINUED | OUTPATIENT
Start: 2018-01-01 | End: 2018-01-01

## 2018-01-01 RX ORDER — MORPHINE SULFATE 100 MG/5ML
5-10 SOLUTION ORAL
Status: DISCONTINUED | OUTPATIENT
Start: 2018-01-01 | End: 2018-01-01 | Stop reason: HOSPADM

## 2018-01-01 RX ORDER — AMOXICILLIN 250 MG
2 CAPSULE ORAL 2 TIMES DAILY PRN
Status: DISCONTINUED | OUTPATIENT
Start: 2018-01-01 | End: 2018-01-01 | Stop reason: HOSPADM

## 2018-01-01 RX ORDER — PROPOFOL 10 MG/ML
10-20 INJECTION, EMULSION INTRAVENOUS EVERY 30 MIN PRN
Status: DISCONTINUED | OUTPATIENT
Start: 2018-01-01 | End: 2018-01-01

## 2018-01-01 RX ORDER — METHADONE HYDROCHLORIDE 10 MG/ML
60 CONCENTRATE ORAL DAILY
Status: DISCONTINUED | OUTPATIENT
Start: 2018-01-01 | End: 2018-01-01 | Stop reason: HOSPADM

## 2018-01-01 RX ORDER — FLUTICASONE PROPIONATE 50 MCG
1 SPRAY, SUSPENSION (ML) NASAL DAILY
Qty: 1 BOTTLE | Refills: 11 | Status: ON HOLD | OUTPATIENT
Start: 2018-01-01 | End: 2018-01-01

## 2018-01-01 RX ORDER — HYDRALAZINE HYDROCHLORIDE 20 MG/ML
10 INJECTION INTRAMUSCULAR; INTRAVENOUS EVERY 4 HOURS PRN
Status: DISCONTINUED | OUTPATIENT
Start: 2018-01-01 | End: 2018-01-01

## 2018-01-01 RX ORDER — GADOBUTROL 604.72 MG/ML
8 INJECTION INTRAVENOUS ONCE
Status: COMPLETED | OUTPATIENT
Start: 2018-01-01 | End: 2018-01-01

## 2018-01-01 RX ORDER — AMOXICILLIN 250 MG
1 CAPSULE ORAL 2 TIMES DAILY PRN
Status: DISCONTINUED | OUTPATIENT
Start: 2018-01-01 | End: 2018-01-01 | Stop reason: HOSPADM

## 2018-01-01 RX ORDER — IPRATROPIUM BROMIDE AND ALBUTEROL SULFATE 2.5; .5 MG/3ML; MG/3ML
3 SOLUTION RESPIRATORY (INHALATION) EVERY 4 HOURS PRN
Status: DISCONTINUED | OUTPATIENT
Start: 2018-01-01 | End: 2018-01-01 | Stop reason: HOSPADM

## 2018-01-01 RX ORDER — LABETALOL HYDROCHLORIDE 5 MG/ML
10-20 INJECTION, SOLUTION INTRAVENOUS EVERY 4 HOURS PRN
Status: DISCONTINUED | OUTPATIENT
Start: 2018-01-01 | End: 2018-01-01

## 2018-01-01 RX ORDER — BISACODYL 10 MG
10 SUPPOSITORY, RECTAL RECTAL DAILY PRN
Qty: 30 SUPPOSITORY | DISCHARGE
Start: 2018-01-01

## 2018-01-01 RX ORDER — METHADONE HYDROCHLORIDE 10 MG/ML
60 CONCENTRATE ORAL DAILY
Status: DISCONTINUED | OUTPATIENT
Start: 2018-01-01 | End: 2018-01-01

## 2018-01-01 RX ORDER — MAGNESIUM SULFATE HEPTAHYDRATE 40 MG/ML
4 INJECTION, SOLUTION INTRAVENOUS EVERY 4 HOURS PRN
Status: DISCONTINUED | OUTPATIENT
Start: 2018-01-01 | End: 2018-01-01

## 2018-01-01 RX ORDER — METHADONE HYDROCHLORIDE 10 MG/ML
60 CONCENTRATE ORAL DAILY
Qty: 30 ML | Refills: 0 | Status: SHIPPED | OUTPATIENT
Start: 2018-01-01

## 2018-01-01 RX ORDER — OSELTAMIVIR PHOSPHATE 6 MG/ML
75 FOR SUSPENSION ORAL DAILY
Status: DISCONTINUED | OUTPATIENT
Start: 2018-01-01 | End: 2018-01-01 | Stop reason: CLARIF

## 2018-01-01 RX ORDER — CHLORHEXIDINE GLUCONATE ORAL RINSE 1.2 MG/ML
15 SOLUTION DENTAL EVERY 12 HOURS
Status: DISCONTINUED | OUTPATIENT
Start: 2018-01-01 | End: 2018-01-01 | Stop reason: CLARIF

## 2018-01-01 RX ORDER — DULOXETIN HYDROCHLORIDE 30 MG/1
30 CAPSULE, DELAYED RELEASE ORAL 2 TIMES DAILY
Status: DISCONTINUED | OUTPATIENT
Start: 2018-01-01 | End: 2018-01-01

## 2018-01-01 RX ORDER — SODIUM CHLORIDE 9 MG/ML
INJECTION, SOLUTION INTRAVENOUS CONTINUOUS
Status: DISCONTINUED | OUTPATIENT
Start: 2018-01-01 | End: 2018-01-01

## 2018-01-01 RX ORDER — NALOXONE HYDROCHLORIDE 0.4 MG/ML
.1-.4 INJECTION, SOLUTION INTRAMUSCULAR; INTRAVENOUS; SUBCUTANEOUS
Status: DISCONTINUED | OUTPATIENT
Start: 2018-01-01 | End: 2018-01-01

## 2018-01-01 RX ORDER — POTASSIUM CHLORIDE 29.8 MG/ML
20 INJECTION INTRAVENOUS
Status: DISCONTINUED | OUTPATIENT
Start: 2018-01-01 | End: 2018-01-01 | Stop reason: CLARIF

## 2018-01-01 RX ORDER — MORPHINE SULFATE 10 MG/5ML
5-10 SOLUTION ORAL
Status: DISCONTINUED | OUTPATIENT
Start: 2018-01-01 | End: 2018-01-01

## 2018-01-01 RX ORDER — MORPHINE SULFATE 2 MG/ML
1-2 INJECTION, SOLUTION INTRAMUSCULAR; INTRAVENOUS
Status: DISCONTINUED | OUTPATIENT
Start: 2018-01-01 | End: 2018-01-01

## 2018-01-01 RX ORDER — POTASSIUM CHLORIDE 1500 MG/1
20-40 TABLET, EXTENDED RELEASE ORAL
Status: DISCONTINUED | OUTPATIENT
Start: 2018-01-01 | End: 2018-01-01

## 2018-01-01 RX ORDER — HALOPERIDOL 2 MG/ML
1-2 SOLUTION ORAL EVERY 6 HOURS PRN
Status: DISCONTINUED | OUTPATIENT
Start: 2018-01-01 | End: 2018-01-01 | Stop reason: HOSPADM

## 2018-01-01 RX ORDER — PROPOFOL 10 MG/ML
5-75 INJECTION, EMULSION INTRAVENOUS CONTINUOUS
Status: DISCONTINUED | OUTPATIENT
Start: 2018-01-01 | End: 2018-01-01

## 2018-01-01 RX ORDER — MORPHINE SULFATE 100 MG/5ML
2 SOLUTION ORAL
Qty: 30 ML | Refills: 0 | Status: SHIPPED | OUTPATIENT
Start: 2018-01-01

## 2018-01-01 RX ORDER — MORPHINE SULFATE 100 MG/5ML
1-2 SOLUTION ORAL
Qty: 30 ML | Refills: 0 | Status: SHIPPED | OUTPATIENT
Start: 2018-01-01 | End: 2018-01-01

## 2018-01-01 RX ORDER — METHADONE HYDROCHLORIDE 10 MG/ML
60 CONCENTRATE ORAL DAILY
Qty: 30 ML | Refills: 0 | Status: SHIPPED | OUTPATIENT
Start: 2018-01-01 | End: 2018-01-01

## 2018-01-01 RX ORDER — GLYCOPYRROLATE 0.2 MG/ML
.2-.4 INJECTION, SOLUTION INTRAMUSCULAR; INTRAVENOUS EVERY 4 HOURS PRN
Status: DISCONTINUED | OUTPATIENT
Start: 2018-01-01 | End: 2018-01-01 | Stop reason: HOSPADM

## 2018-01-01 RX ORDER — ATROPINE SULFATE 10 MG/ML
1-2 SOLUTION/ DROPS OPHTHALMIC
Status: DISCONTINUED | OUTPATIENT
Start: 2018-01-01 | End: 2018-01-01 | Stop reason: HOSPADM

## 2018-01-01 RX ORDER — POTASSIUM CHLORIDE 7.45 MG/ML
10 INJECTION INTRAVENOUS
Status: DISCONTINUED | OUTPATIENT
Start: 2018-01-01 | End: 2018-01-01

## 2018-01-01 RX ORDER — ACETAMINOPHEN 650 MG/1
650 SUPPOSITORY RECTAL EVERY 6 HOURS PRN
Qty: 60 SUPPOSITORY | DISCHARGE
Start: 2018-01-01

## 2018-01-01 RX ORDER — ONDANSETRON 4 MG/1
4 TABLET, ORALLY DISINTEGRATING ORAL EVERY 6 HOURS PRN
Status: DISCONTINUED | OUTPATIENT
Start: 2018-01-01 | End: 2018-01-01

## 2018-01-01 RX ORDER — AZITHROMYCIN 500 MG/1
500 TABLET, FILM COATED ORAL DAILY
Qty: 3 TABLET | Refills: 1 | Status: ON HOLD | OUTPATIENT
Start: 2018-01-01 | End: 2018-01-01

## 2018-01-01 RX ORDER — HALOPERIDOL 2 MG/ML
1-2 SOLUTION ORAL EVERY 6 HOURS PRN
Qty: 60 ML | DISCHARGE
Start: 2018-01-01 | End: 2018-01-01

## 2018-01-01 RX ORDER — ALBUTEROL SULFATE 0.83 MG/ML
2.5 SOLUTION RESPIRATORY (INHALATION)
Status: DISCONTINUED | OUTPATIENT
Start: 2018-01-01 | End: 2018-01-01 | Stop reason: HOSPADM

## 2018-01-01 RX ORDER — ACETAMINOPHEN 325 MG/1
650 TABLET ORAL EVERY 6 HOURS PRN
Status: DISCONTINUED | OUTPATIENT
Start: 2018-01-01 | End: 2018-01-01

## 2018-01-01 RX ORDER — OSELTAMIVIR PHOSPHATE 75 MG/1
75 CAPSULE ORAL 2 TIMES DAILY
Qty: 10 CAPSULE | Refills: 0 | Status: ON HOLD | OUTPATIENT
Start: 2018-01-01 | End: 2018-01-01

## 2018-01-01 RX ORDER — OXYMETAZOLINE HYDROCHLORIDE 0.05 G/100ML
2 SPRAY NASAL 2 TIMES DAILY
Status: DISCONTINUED | OUTPATIENT
Start: 2018-01-01 | End: 2018-01-01

## 2018-01-01 RX ORDER — IPRATROPIUM BROMIDE AND ALBUTEROL SULFATE 2.5; .5 MG/3ML; MG/3ML
3 SOLUTION RESPIRATORY (INHALATION) EVERY 4 HOURS
Status: DISCONTINUED | OUTPATIENT
Start: 2018-01-01 | End: 2018-01-01

## 2018-01-01 RX ORDER — SODIUM CHLORIDE 9 MG/ML
1000 INJECTION, SOLUTION INTRAVENOUS CONTINUOUS
Status: DISCONTINUED | OUTPATIENT
Start: 2018-01-01 | End: 2018-01-01 | Stop reason: ALTCHOICE

## 2018-01-01 RX ORDER — ONDANSETRON 2 MG/ML
4 INJECTION INTRAMUSCULAR; INTRAVENOUS EVERY 6 HOURS PRN
Status: DISCONTINUED | OUTPATIENT
Start: 2018-01-01 | End: 2018-01-01

## 2018-01-01 RX ORDER — DEXTROSE MONOHYDRATE 25 G/50ML
25-50 INJECTION, SOLUTION INTRAVENOUS
Status: DISCONTINUED | OUTPATIENT
Start: 2018-01-01 | End: 2018-01-01

## 2018-01-01 RX ORDER — ETOMIDATE 2 MG/ML
25 INJECTION INTRAVENOUS ONCE
Status: COMPLETED | OUTPATIENT
Start: 2018-01-01 | End: 2018-01-01

## 2018-01-01 RX ORDER — HALOPERIDOL 2 MG/ML
2 SOLUTION ORAL EVERY 6 HOURS PRN
Qty: 60 ML | Refills: 0 | Status: SHIPPED | OUTPATIENT
Start: 2018-01-01

## 2018-01-01 RX ORDER — POTASSIUM CL/LIDO/0.9 % NACL 10MEQ/0.1L
10 INTRAVENOUS SOLUTION, PIGGYBACK (ML) INTRAVENOUS
Status: DISCONTINUED | OUTPATIENT
Start: 2018-01-01 | End: 2018-01-01

## 2018-01-01 RX ORDER — POTASSIUM CHLORIDE 1.5 G/1.58G
20-40 POWDER, FOR SOLUTION ORAL
Status: DISCONTINUED | OUTPATIENT
Start: 2018-01-01 | End: 2018-01-01

## 2018-01-01 RX ORDER — NICOTINE POLACRILEX 4 MG
15-30 LOZENGE BUCCAL
Status: DISCONTINUED | OUTPATIENT
Start: 2018-01-01 | End: 2018-01-01

## 2018-01-01 RX ORDER — ACETAMINOPHEN 650 MG/1
650 SUPPOSITORY RECTAL EVERY 6 HOURS PRN
Status: DISCONTINUED | OUTPATIENT
Start: 2018-01-01 | End: 2018-01-01 | Stop reason: HOSPADM

## 2018-01-01 RX ORDER — BISACODYL 10 MG
10 SUPPOSITORY, RECTAL RECTAL
Status: DISCONTINUED | OUTPATIENT
Start: 2018-01-01 | End: 2018-01-01 | Stop reason: HOSPADM

## 2018-01-01 RX ORDER — LABETALOL HYDROCHLORIDE 5 MG/ML
40 INJECTION, SOLUTION INTRAVENOUS EVERY 4 HOURS PRN
Status: DISCONTINUED | OUTPATIENT
Start: 2018-01-01 | End: 2018-01-01

## 2018-01-01 RX ORDER — CARVEDILOL 6.25 MG/1
6.25 TABLET ORAL 2 TIMES DAILY WITH MEALS
Status: DISCONTINUED | OUTPATIENT
Start: 2018-01-01 | End: 2018-01-01

## 2018-01-01 RX ADMIN — CEFTRIAXONE 2 G: 10 INJECTION, POWDER, FOR SOLUTION INTRAVENOUS at 12:54

## 2018-01-01 RX ADMIN — ACETAMINOPHEN 650 MG: 160 SUSPENSION ORAL at 08:22

## 2018-01-01 RX ADMIN — OXYMETAZOLINE HYDROCHLORIDE 2 SPRAY: 5 SPRAY NASAL at 07:31

## 2018-01-01 RX ADMIN — HYDRALAZINE HYDROCHLORIDE 10 MG: 20 INJECTION INTRAMUSCULAR; INTRAVENOUS at 01:21

## 2018-01-01 RX ADMIN — METHADONE HYDROCHLORIDE 60 MG: 10 CONCENTRATE ORAL at 08:49

## 2018-01-01 RX ADMIN — MORPHINE SULFATE 2 MG: 2 INJECTION, SOLUTION INTRAMUSCULAR; INTRAVENOUS at 08:29

## 2018-01-01 RX ADMIN — IPRATROPIUM BROMIDE AND ALBUTEROL SULFATE 3 ML: .5; 3 SOLUTION RESPIRATORY (INHALATION) at 07:15

## 2018-01-01 RX ADMIN — SODIUM CHLORIDE: 9 INJECTION, SOLUTION INTRAVENOUS at 09:09

## 2018-01-01 RX ADMIN — ACETAMINOPHEN 650 MG: 325 TABLET, FILM COATED ORAL at 00:38

## 2018-01-01 RX ADMIN — IPRATROPIUM BROMIDE AND ALBUTEROL SULFATE 3 ML: .5; 3 SOLUTION RESPIRATORY (INHALATION) at 23:39

## 2018-01-01 RX ADMIN — IPRATROPIUM BROMIDE AND ALBUTEROL SULFATE 3 ML: .5; 3 SOLUTION RESPIRATORY (INHALATION) at 11:42

## 2018-01-01 RX ADMIN — IPRATROPIUM BROMIDE AND ALBUTEROL SULFATE 3 ML: .5; 3 SOLUTION RESPIRATORY (INHALATION) at 15:57

## 2018-01-01 RX ADMIN — FENTANYL CITRATE 100 MCG: 50 INJECTION, SOLUTION INTRAMUSCULAR; INTRAVENOUS at 18:09

## 2018-01-01 RX ADMIN — FENTANYL CITRATE 100 MCG: 50 INJECTION, SOLUTION INTRAMUSCULAR; INTRAVENOUS at 14:53

## 2018-01-01 RX ADMIN — MORPHINE SULFATE 1 MG: 2 INJECTION, SOLUTION INTRAMUSCULAR; INTRAVENOUS at 05:15

## 2018-01-01 RX ADMIN — POTASSIUM CHLORIDE 40 MEQ: 1.5 POWDER, FOR SOLUTION ORAL at 13:47

## 2018-01-01 RX ADMIN — IPRATROPIUM BROMIDE AND ALBUTEROL SULFATE 3 ML: .5; 3 SOLUTION RESPIRATORY (INHALATION) at 19:02

## 2018-01-01 RX ADMIN — IPRATROPIUM BROMIDE AND ALBUTEROL SULFATE 3 ML: .5; 3 SOLUTION RESPIRATORY (INHALATION) at 15:00

## 2018-01-01 RX ADMIN — ENOXAPARIN SODIUM 40 MG: 40 INJECTION SUBCUTANEOUS at 18:06

## 2018-01-01 RX ADMIN — SODIUM PHOSPHATE, MONOBASIC, MONOHYDRATE 20 MMOL: 276; 142 INJECTION, SOLUTION INTRAVENOUS at 01:44

## 2018-01-01 RX ADMIN — CARVEDILOL 12.5 MG: 12.5 TABLET, FILM COATED ORAL at 08:48

## 2018-01-01 RX ADMIN — PROPOFOL 20 MCG/KG/MIN: 10 INJECTION, EMULSION INTRAVENOUS at 08:10

## 2018-01-01 RX ADMIN — DULOXETINE HYDROCHLORIDE 30 MG: 30 CAPSULE, DELAYED RELEASE ORAL at 14:00

## 2018-01-01 RX ADMIN — SODIUM CHLORIDE: 9 INJECTION, SOLUTION INTRAVENOUS at 06:06

## 2018-01-01 RX ADMIN — FENTANYL CITRATE 100 MCG: 50 INJECTION, SOLUTION INTRAMUSCULAR; INTRAVENOUS at 15:57

## 2018-01-01 RX ADMIN — CARVEDILOL 6.25 MG: 6.25 TABLET, FILM COATED ORAL at 14:00

## 2018-01-01 RX ADMIN — PROPOFOL 10 MCG/KG/MIN: 10 INJECTION, EMULSION INTRAVENOUS at 09:17

## 2018-01-01 RX ADMIN — POTASSIUM CHLORIDE 40 MEQ: 1.5 POWDER, FOR SOLUTION ORAL at 00:13

## 2018-01-01 RX ADMIN — CARVEDILOL 6.25 MG: 6.25 TABLET, FILM COATED ORAL at 08:00

## 2018-01-01 RX ADMIN — SODIUM CHLORIDE 1000 ML: 9 INJECTION, SOLUTION INTRAVENOUS at 08:58

## 2018-01-01 RX ADMIN — SODIUM CHLORIDE: 9 INJECTION, SOLUTION INTRAVENOUS at 05:42

## 2018-01-01 RX ADMIN — ENOXAPARIN SODIUM 40 MG: 40 INJECTION SUBCUTANEOUS at 15:59

## 2018-01-01 RX ADMIN — PANTOPRAZOLE SODIUM 40 MG: 40 INJECTION, POWDER, FOR SOLUTION INTRAVENOUS at 08:52

## 2018-01-01 RX ADMIN — LABETALOL HYDROCHLORIDE 40 MG: 5 INJECTION, SOLUTION INTRAVENOUS at 19:43

## 2018-01-01 RX ADMIN — LABETALOL HYDROCHLORIDE 20 MG: 5 INJECTION, SOLUTION INTRAVENOUS at 06:07

## 2018-01-01 RX ADMIN — ENOXAPARIN SODIUM 40 MG: 40 INJECTION SUBCUTANEOUS at 17:07

## 2018-01-01 RX ADMIN — ACETAMINOPHEN 650 MG: 160 SUSPENSION ORAL at 08:49

## 2018-01-01 RX ADMIN — METHADONE HYDROCHLORIDE 60 MG: 10 CONCENTRATE ORAL at 13:47

## 2018-01-01 RX ADMIN — CARVEDILOL 6.25 MG: 6.25 TABLET, FILM COATED ORAL at 11:18

## 2018-01-01 RX ADMIN — IPRATROPIUM BROMIDE AND ALBUTEROL SULFATE 3 ML: .5; 3 SOLUTION RESPIRATORY (INHALATION) at 07:48

## 2018-01-01 RX ADMIN — VANCOMYCIN HYDROCHLORIDE 1500 MG: 5 INJECTION, POWDER, LYOPHILIZED, FOR SOLUTION INTRAVENOUS at 10:04

## 2018-01-01 RX ADMIN — ENOXAPARIN SODIUM 40 MG: 40 INJECTION SUBCUTANEOUS at 19:00

## 2018-01-01 RX ADMIN — IPRATROPIUM BROMIDE AND ALBUTEROL SULFATE 3 ML: .5; 3 SOLUTION RESPIRATORY (INHALATION) at 16:09

## 2018-01-01 RX ADMIN — METHADONE HYDROCHLORIDE 60 MG: 10 CONCENTRATE ORAL at 13:58

## 2018-01-01 RX ADMIN — DULOXETINE HYDROCHLORIDE 30 MG: 30 CAPSULE, DELAYED RELEASE ORAL at 20:52

## 2018-01-01 RX ADMIN — SODIUM CHLORIDE 2238 ML: 9 INJECTION, SOLUTION INTRAVENOUS at 09:38

## 2018-01-01 RX ADMIN — IPRATROPIUM BROMIDE AND ALBUTEROL SULFATE 3 ML: .5; 3 SOLUTION RESPIRATORY (INHALATION) at 11:58

## 2018-01-01 RX ADMIN — IPRATROPIUM BROMIDE AND ALBUTEROL SULFATE 3 ML: .5; 3 SOLUTION RESPIRATORY (INHALATION) at 03:31

## 2018-01-01 RX ADMIN — CHLORHEXIDINE GLUCONATE 15 ML: 1.2 RINSE ORAL at 08:12

## 2018-01-01 RX ADMIN — ACETAMINOPHEN 650 MG: 160 SUSPENSION ORAL at 18:06

## 2018-01-01 RX ADMIN — IPRATROPIUM BROMIDE AND ALBUTEROL SULFATE 3 ML: .5; 3 SOLUTION RESPIRATORY (INHALATION) at 23:33

## 2018-01-01 RX ADMIN — HYDRALAZINE HYDROCHLORIDE 10 MG: 20 INJECTION INTRAMUSCULAR; INTRAVENOUS at 13:53

## 2018-01-01 RX ADMIN — SODIUM CHLORIDE: 9 INJECTION, SOLUTION INTRAVENOUS at 10:18

## 2018-01-01 RX ADMIN — FENTANYL CITRATE 50 MCG: 50 INJECTION, SOLUTION INTRAMUSCULAR; INTRAVENOUS at 00:07

## 2018-01-01 RX ADMIN — ENOXAPARIN SODIUM 40 MG: 40 INJECTION SUBCUTANEOUS at 15:42

## 2018-01-01 RX ADMIN — DULOXETINE HYDROCHLORIDE 30 MG: 30 CAPSULE, DELAYED RELEASE ORAL at 08:22

## 2018-01-01 RX ADMIN — SODIUM CHLORIDE, POTASSIUM CHLORIDE, SODIUM LACTATE AND CALCIUM CHLORIDE 500 ML: 600; 310; 30; 20 INJECTION, SOLUTION INTRAVENOUS at 13:42

## 2018-01-01 RX ADMIN — ACETAMINOPHEN 650 MG: 160 SUSPENSION ORAL at 01:30

## 2018-01-01 RX ADMIN — SODIUM CHLORIDE: 9 INJECTION, SOLUTION INTRAVENOUS at 00:58

## 2018-01-01 RX ADMIN — IPRATROPIUM BROMIDE AND ALBUTEROL SULFATE 3 ML: .5; 3 SOLUTION RESPIRATORY (INHALATION) at 11:34

## 2018-01-01 RX ADMIN — GLYCOPYRROLATE 0.2 MG: 0.2 INJECTION, SOLUTION INTRAMUSCULAR; INTRAVENOUS at 16:13

## 2018-01-01 RX ADMIN — IPRATROPIUM BROMIDE AND ALBUTEROL SULFATE 3 ML: .5; 3 SOLUTION RESPIRATORY (INHALATION) at 10:23

## 2018-01-01 RX ADMIN — IPRATROPIUM BROMIDE AND ALBUTEROL SULFATE 3 ML: .5; 3 SOLUTION RESPIRATORY (INHALATION) at 19:05

## 2018-01-01 RX ADMIN — ETOMIDATE 25 MG: 20 INJECTION, SOLUTION INTRAVENOUS at 09:01

## 2018-01-01 RX ADMIN — PROPOFOL 40 MCG/KG/MIN: 10 INJECTION, EMULSION INTRAVENOUS at 01:44

## 2018-01-01 RX ADMIN — HYDRALAZINE HYDROCHLORIDE 10 MG: 20 INJECTION INTRAMUSCULAR; INTRAVENOUS at 08:29

## 2018-01-01 RX ADMIN — IPRATROPIUM BROMIDE AND ALBUTEROL SULFATE 3 ML: .5; 3 SOLUTION RESPIRATORY (INHALATION) at 03:30

## 2018-01-01 RX ADMIN — PROPOFOL 40 MCG/KG/MIN: 10 INJECTION, EMULSION INTRAVENOUS at 06:48

## 2018-01-01 RX ADMIN — IPRATROPIUM BROMIDE AND ALBUTEROL SULFATE 3 ML: .5; 3 SOLUTION RESPIRATORY (INHALATION) at 23:16

## 2018-01-01 RX ADMIN — CEFTRIAXONE 2 G: 10 INJECTION, POWDER, FOR SOLUTION INTRAVENOUS at 14:22

## 2018-01-01 RX ADMIN — IPRATROPIUM BROMIDE AND ALBUTEROL SULFATE 3 ML: .5; 3 SOLUTION RESPIRATORY (INHALATION) at 11:37

## 2018-01-01 RX ADMIN — CARVEDILOL 6.25 MG: 6.25 TABLET, FILM COATED ORAL at 18:40

## 2018-01-01 RX ADMIN — SUCCINYLCHOLINE CHLORIDE 150 MG: 20 INJECTION, SOLUTION INTRAMUSCULAR; INTRAVENOUS at 09:52

## 2018-01-01 RX ADMIN — SULFUR HEXAFLUORIDE 5 ML: KIT at 16:05

## 2018-01-01 RX ADMIN — FENTANYL CITRATE 50 MCG: 50 INJECTION, SOLUTION INTRAMUSCULAR; INTRAVENOUS at 18:25

## 2018-01-01 RX ADMIN — FLUTICASONE FUROATE 1 PUFF: 100 POWDER RESPIRATORY (INHALATION) at 21:38

## 2018-01-01 RX ADMIN — Medication 40 MG: at 08:49

## 2018-01-01 RX ADMIN — CEFTRIAXONE 2 G: 10 INJECTION, POWDER, FOR SOLUTION INTRAVENOUS at 12:32

## 2018-01-01 RX ADMIN — METHADONE HYDROCHLORIDE 60 MG: 10 CONCENTRATE ORAL at 09:29

## 2018-01-01 RX ADMIN — PIPERACILLIN SODIUM AND TAZOBACTAM SODIUM 4.5 G: 36; 4.5 INJECTION, POWDER, FOR SOLUTION INTRAVENOUS at 09:58

## 2018-01-01 RX ADMIN — SODIUM CHLORIDE: 9 INJECTION, SOLUTION INTRAVENOUS at 08:10

## 2018-01-01 RX ADMIN — ACETAMINOPHEN 650 MG: 160 SUSPENSION ORAL at 03:38

## 2018-01-01 RX ADMIN — IPRATROPIUM BROMIDE AND ALBUTEROL SULFATE 3 ML: .5; 3 SOLUTION RESPIRATORY (INHALATION) at 15:14

## 2018-01-01 RX ADMIN — CARVEDILOL 12.5 MG: 12.5 TABLET, FILM COATED ORAL at 18:06

## 2018-01-01 RX ADMIN — SODIUM CHLORIDE: 9 INJECTION, SOLUTION INTRAVENOUS at 17:42

## 2018-01-01 RX ADMIN — CHLORHEXIDINE GLUCONATE 15 ML: 1.2 RINSE ORAL at 08:56

## 2018-01-01 RX ADMIN — METHADONE HYDROCHLORIDE 60 MG: 10 CONCENTRATE ORAL at 09:19

## 2018-01-01 RX ADMIN — Medication 40 MG: at 08:22

## 2018-01-01 RX ADMIN — DULOXETINE HYDROCHLORIDE 30 MG: 30 CAPSULE, DELAYED RELEASE ORAL at 09:07

## 2018-01-01 RX ADMIN — AZITHROMYCIN MONOHYDRATE 250 MG: 500 INJECTION, POWDER, LYOPHILIZED, FOR SOLUTION INTRAVENOUS at 14:51

## 2018-01-01 RX ADMIN — CEFTRIAXONE 2 G: 10 INJECTION, POWDER, FOR SOLUTION INTRAVENOUS at 15:12

## 2018-01-01 RX ADMIN — CARVEDILOL 12.5 MG: 12.5 TABLET, FILM COATED ORAL at 08:22

## 2018-01-01 RX ADMIN — CHLORHEXIDINE GLUCONATE 15 ML: 1.2 RINSE ORAL at 07:45

## 2018-01-01 RX ADMIN — ENOXAPARIN SODIUM 40 MG: 40 INJECTION SUBCUTANEOUS at 16:06

## 2018-01-01 RX ADMIN — INSULIN ASPART 1 UNITS: 100 INJECTION, SOLUTION INTRAVENOUS; SUBCUTANEOUS at 05:07

## 2018-01-01 RX ADMIN — SODIUM PHOSPHATE, MONOBASIC, MONOHYDRATE 15 MMOL: 276; 142 INJECTION, SOLUTION INTRAVENOUS at 15:17

## 2018-01-01 RX ADMIN — METHADONE HYDROCHLORIDE 60 MG: 10 CONCENTRATE ORAL at 09:54

## 2018-01-01 RX ADMIN — Medication 40 MG: at 09:07

## 2018-01-01 RX ADMIN — IPRATROPIUM BROMIDE AND ALBUTEROL SULFATE 3 ML: .5; 3 SOLUTION RESPIRATORY (INHALATION) at 04:27

## 2018-01-01 RX ADMIN — POTASSIUM CHLORIDE 20 MEQ: 1.5 POWDER, FOR SOLUTION ORAL at 16:06

## 2018-01-01 RX ADMIN — IPRATROPIUM BROMIDE AND ALBUTEROL SULFATE 3 ML: .5; 3 SOLUTION RESPIRATORY (INHALATION) at 03:47

## 2018-01-01 RX ADMIN — IPRATROPIUM BROMIDE AND ALBUTEROL SULFATE 3 ML: .5; 3 SOLUTION RESPIRATORY (INHALATION) at 20:00

## 2018-01-01 RX ADMIN — CEFTRIAXONE 2 G: 10 INJECTION, POWDER, FOR SOLUTION INTRAVENOUS at 13:29

## 2018-01-01 RX ADMIN — PANTOPRAZOLE SODIUM 40 MG: 40 INJECTION, POWDER, FOR SOLUTION INTRAVENOUS at 09:56

## 2018-01-01 RX ADMIN — IPRATROPIUM BROMIDE AND ALBUTEROL SULFATE 3 ML: .5; 3 SOLUTION RESPIRATORY (INHALATION) at 19:10

## 2018-01-01 RX ADMIN — IPRATROPIUM BROMIDE AND ALBUTEROL SULFATE 3 ML: .5; 3 SOLUTION RESPIRATORY (INHALATION) at 07:46

## 2018-01-01 RX ADMIN — IPRATROPIUM BROMIDE AND ALBUTEROL SULFATE 3 ML: .5; 3 SOLUTION RESPIRATORY (INHALATION) at 22:48

## 2018-01-01 RX ADMIN — FENTANYL CITRATE 100 MCG: 50 INJECTION, SOLUTION INTRAMUSCULAR; INTRAVENOUS at 00:11

## 2018-01-01 RX ADMIN — IPRATROPIUM BROMIDE AND ALBUTEROL SULFATE 3 ML: .5; 3 SOLUTION RESPIRATORY (INHALATION) at 18:11

## 2018-01-01 RX ADMIN — IPRATROPIUM BROMIDE AND ALBUTEROL SULFATE 3 ML: .5; 3 SOLUTION RESPIRATORY (INHALATION) at 06:54

## 2018-01-01 RX ADMIN — IPRATROPIUM BROMIDE AND ALBUTEROL SULFATE 3 ML: .5; 3 SOLUTION RESPIRATORY (INHALATION) at 03:25

## 2018-01-01 RX ADMIN — IPRATROPIUM BROMIDE AND ALBUTEROL SULFATE 3 ML: .5; 3 SOLUTION RESPIRATORY (INHALATION) at 23:14

## 2018-01-01 RX ADMIN — FENTANYL CITRATE 50 MCG: 50 INJECTION, SOLUTION INTRAMUSCULAR; INTRAVENOUS at 04:19

## 2018-01-01 RX ADMIN — METHADONE HYDROCHLORIDE 60 MG: 10 CONCENTRATE ORAL at 09:14

## 2018-01-01 RX ADMIN — DULOXETINE HYDROCHLORIDE 30 MG: 30 CAPSULE, DELAYED RELEASE ORAL at 09:11

## 2018-01-01 RX ADMIN — IPRATROPIUM BROMIDE AND ALBUTEROL SULFATE 3 ML: .5; 3 SOLUTION RESPIRATORY (INHALATION) at 11:07

## 2018-01-01 RX ADMIN — IPRATROPIUM BROMIDE AND ALBUTEROL SULFATE 3 ML: .5; 3 SOLUTION RESPIRATORY (INHALATION) at 19:57

## 2018-01-01 RX ADMIN — LABETALOL HYDROCHLORIDE 10 MG: 5 INJECTION, SOLUTION INTRAVENOUS at 22:18

## 2018-01-01 RX ADMIN — SODIUM CHLORIDE: 9 INJECTION, SOLUTION INTRAVENOUS at 02:41

## 2018-01-01 RX ADMIN — PROPOFOL 40 MCG/KG/MIN: 10 INJECTION, EMULSION INTRAVENOUS at 08:07

## 2018-01-01 RX ADMIN — DULOXETINE HYDROCHLORIDE 30 MG: 30 CAPSULE, DELAYED RELEASE ORAL at 21:50

## 2018-01-01 RX ADMIN — SODIUM CHLORIDE: 9 INJECTION, SOLUTION INTRAVENOUS at 19:20

## 2018-01-01 RX ADMIN — IPRATROPIUM BROMIDE AND ALBUTEROL SULFATE 3 ML: .5; 3 SOLUTION RESPIRATORY (INHALATION) at 08:29

## 2018-01-01 RX ADMIN — CARVEDILOL 6.25 MG: 6.25 TABLET, FILM COATED ORAL at 09:07

## 2018-01-01 RX ADMIN — IPRATROPIUM BROMIDE AND ALBUTEROL SULFATE 3 ML: .5; 3 SOLUTION RESPIRATORY (INHALATION) at 08:12

## 2018-01-01 RX ADMIN — DULOXETINE HYDROCHLORIDE 30 MG: 30 CAPSULE, DELAYED RELEASE ORAL at 20:50

## 2018-01-01 RX ADMIN — PROPOFOL 10 MCG/KG/MIN: 10 INJECTION, EMULSION INTRAVENOUS at 00:16

## 2018-01-01 RX ADMIN — HYDRALAZINE HYDROCHLORIDE 10 MG: 20 INJECTION INTRAMUSCULAR; INTRAVENOUS at 19:19

## 2018-01-01 RX ADMIN — CEFTRIAXONE 2 G: 10 INJECTION, POWDER, FOR SOLUTION INTRAVENOUS at 12:49

## 2018-01-01 RX ADMIN — OXYMETAZOLINE HYDROCHLORIDE 2 SPRAY: 5 SPRAY NASAL at 20:50

## 2018-01-01 RX ADMIN — PROPOFOL 20 MCG/KG/MIN: 10 INJECTION, EMULSION INTRAVENOUS at 17:57

## 2018-01-01 RX ADMIN — CHLORHEXIDINE GLUCONATE 15 ML: 1.2 RINSE ORAL at 20:37

## 2018-01-01 RX ADMIN — PROPOFOL 15 MCG/KG/MIN: 10 INJECTION, EMULSION INTRAVENOUS at 23:28

## 2018-01-01 RX ADMIN — ENOXAPARIN SODIUM 40 MG: 40 INJECTION SUBCUTANEOUS at 17:25

## 2018-01-01 RX ADMIN — AZITHROMYCIN 500 MG: 500 INJECTION, POWDER, LYOPHILIZED, FOR SOLUTION INTRAVENOUS at 13:36

## 2018-01-01 RX ADMIN — DULOXETINE HYDROCHLORIDE 30 MG: 30 CAPSULE, DELAYED RELEASE ORAL at 08:48

## 2018-01-01 RX ADMIN — HYDRALAZINE HYDROCHLORIDE 10 MG: 20 INJECTION INTRAMUSCULAR; INTRAVENOUS at 08:48

## 2018-01-01 RX ADMIN — IPRATROPIUM BROMIDE AND ALBUTEROL SULFATE 3 ML: .5; 3 SOLUTION RESPIRATORY (INHALATION) at 19:22

## 2018-01-01 RX ADMIN — IPRATROPIUM BROMIDE AND ALBUTEROL SULFATE 3 ML: .5; 3 SOLUTION RESPIRATORY (INHALATION) at 23:57

## 2018-01-01 RX ADMIN — IPRATROPIUM BROMIDE AND ALBUTEROL SULFATE 3 ML: .5; 3 SOLUTION RESPIRATORY (INHALATION) at 08:42

## 2018-01-01 RX ADMIN — LABETALOL HYDROCHLORIDE 40 MG: 5 INJECTION, SOLUTION INTRAVENOUS at 03:31

## 2018-01-01 RX ADMIN — ENOXAPARIN SODIUM 40 MG: 40 INJECTION SUBCUTANEOUS at 15:43

## 2018-01-01 RX ADMIN — CHLORHEXIDINE GLUCONATE 15 ML: 1.2 RINSE ORAL at 20:02

## 2018-01-01 RX ADMIN — IPRATROPIUM BROMIDE AND ALBUTEROL SULFATE 3 ML: .5; 3 SOLUTION RESPIRATORY (INHALATION) at 00:46

## 2018-01-01 RX ADMIN — IPRATROPIUM BROMIDE AND ALBUTEROL SULFATE 3 ML: .5; 3 SOLUTION RESPIRATORY (INHALATION) at 16:21

## 2018-01-01 RX ADMIN — IPRATROPIUM BROMIDE AND ALBUTEROL SULFATE 3 ML: .5; 3 SOLUTION RESPIRATORY (INHALATION) at 20:04

## 2018-01-01 RX ADMIN — MORPHINE SULFATE 5 MG: 100 SOLUTION ORAL at 16:15

## 2018-01-01 RX ADMIN — HYDRALAZINE HYDROCHLORIDE 10 MG: 20 INJECTION INTRAMUSCULAR; INTRAVENOUS at 21:50

## 2018-01-01 RX ADMIN — SODIUM CHLORIDE: 9 INJECTION, SOLUTION INTRAVENOUS at 17:00

## 2018-01-01 RX ADMIN — HYDRALAZINE HYDROCHLORIDE 10 MG: 20 INJECTION INTRAMUSCULAR; INTRAVENOUS at 15:53

## 2018-01-01 RX ADMIN — IPRATROPIUM BROMIDE AND ALBUTEROL SULFATE 3 ML: .5; 3 SOLUTION RESPIRATORY (INHALATION) at 03:55

## 2018-01-01 RX ADMIN — IPRATROPIUM BROMIDE AND ALBUTEROL SULFATE 3 ML: .5; 3 SOLUTION RESPIRATORY (INHALATION) at 15:31

## 2018-01-01 RX ADMIN — DULOXETINE HYDROCHLORIDE 30 MG: 30 CAPSULE, DELAYED RELEASE ORAL at 21:38

## 2018-01-01 RX ADMIN — IPRATROPIUM BROMIDE AND ALBUTEROL SULFATE 3 ML: .5; 3 SOLUTION RESPIRATORY (INHALATION) at 03:04

## 2018-01-01 RX ADMIN — CEFTRIAXONE 2 G: 10 INJECTION, POWDER, FOR SOLUTION INTRAVENOUS at 13:06

## 2018-01-01 RX ADMIN — IPRATROPIUM BROMIDE AND ALBUTEROL SULFATE 3 ML: .5; 3 SOLUTION RESPIRATORY (INHALATION) at 00:00

## 2018-01-01 RX ADMIN — ACETAMINOPHEN 650 MG: 160 SUSPENSION ORAL at 09:07

## 2018-01-01 RX ADMIN — POTASSIUM CHLORIDE 40 MEQ: 1.5 POWDER, FOR SOLUTION ORAL at 03:00

## 2018-01-01 RX ADMIN — SODIUM CHLORIDE: 9 INJECTION, SOLUTION INTRAVENOUS at 19:58

## 2018-01-01 RX ADMIN — PROPOFOL 40 MCG/KG/MIN: 10 INJECTION, EMULSION INTRAVENOUS at 15:45

## 2018-01-01 RX ADMIN — IPRATROPIUM BROMIDE AND ALBUTEROL SULFATE 3 ML: .5; 3 SOLUTION RESPIRATORY (INHALATION) at 07:17

## 2018-01-01 RX ADMIN — LABETALOL HYDROCHLORIDE 10 MG: 5 INJECTION, SOLUTION INTRAVENOUS at 14:01

## 2018-01-01 RX ADMIN — INSULIN ASPART 1 UNITS: 100 INJECTION, SOLUTION INTRAVENOUS; SUBCUTANEOUS at 05:03

## 2018-01-01 RX ADMIN — PANTOPRAZOLE SODIUM 40 MG: 40 INJECTION, POWDER, FOR SOLUTION INTRAVENOUS at 07:52

## 2018-01-01 RX ADMIN — PANTOPRAZOLE SODIUM 40 MG: 40 INJECTION, POWDER, FOR SOLUTION INTRAVENOUS at 13:46

## 2018-01-01 RX ADMIN — IPRATROPIUM BROMIDE AND ALBUTEROL SULFATE 3 ML: .5; 3 SOLUTION RESPIRATORY (INHALATION) at 14:06

## 2018-01-01 RX ADMIN — PROPOFOL 20 MG: 10 INJECTION, EMULSION INTRAVENOUS at 23:29

## 2018-01-01 RX ADMIN — HYDRALAZINE HYDROCHLORIDE 10 MG: 20 INJECTION INTRAMUSCULAR; INTRAVENOUS at 19:59

## 2018-01-01 RX ADMIN — CEFTRIAXONE 2 G: 10 INJECTION, POWDER, FOR SOLUTION INTRAVENOUS at 13:35

## 2018-01-01 RX ADMIN — LIDOCAINE HYDROCHLORIDE 1 TUBE: 20 JELLY TOPICAL at 12:15

## 2018-01-01 RX ADMIN — SODIUM CHLORIDE: 9 INJECTION, SOLUTION INTRAVENOUS at 04:10

## 2018-01-01 RX ADMIN — GADOBUTROL 8 ML: 604.72 INJECTION INTRAVENOUS at 17:08

## 2018-01-01 RX ADMIN — INSULIN ASPART 1 UNITS: 100 INJECTION, SOLUTION INTRAVENOUS; SUBCUTANEOUS at 09:08

## 2018-01-01 RX ADMIN — IPRATROPIUM BROMIDE AND ALBUTEROL SULFATE 3 ML: .5; 3 SOLUTION RESPIRATORY (INHALATION) at 16:08

## 2018-01-01 RX ADMIN — CARVEDILOL 12.5 MG: 12.5 TABLET, FILM COATED ORAL at 17:07

## 2018-01-01 RX ADMIN — MORPHINE SULFATE 2 MG: 2 INJECTION, SOLUTION INTRAMUSCULAR; INTRAVENOUS at 11:27

## 2018-01-01 RX ADMIN — SODIUM CHLORIDE: 9 INJECTION, SOLUTION INTRAVENOUS at 21:11

## 2018-01-01 RX ADMIN — FENTANYL CITRATE 100 MCG: 50 INJECTION, SOLUTION INTRAMUSCULAR; INTRAVENOUS at 20:53

## 2018-01-01 RX ADMIN — SODIUM CHLORIDE: 9 INJECTION, SOLUTION INTRAVENOUS at 18:07

## 2018-01-01 RX ADMIN — FENTANYL CITRATE 100 MCG: 50 INJECTION, SOLUTION INTRAMUSCULAR; INTRAVENOUS at 03:55

## 2018-01-01 ASSESSMENT — ACTIVITIES OF DAILY LIVING (ADL)
ADLS_ACUITY_SCORE: 14
ADLS_ACUITY_SCORE: 18
ADLS_ACUITY_SCORE: 14
ADLS_ACUITY_SCORE: 18
ADLS_ACUITY_SCORE: 14
ADLS_ACUITY_SCORE: 14
ADLS_ACUITY_SCORE: 18
ADLS_ACUITY_SCORE: 14
ADLS_ACUITY_SCORE: 14
ADLS_ACUITY_SCORE: 18
ADLS_ACUITY_SCORE: 12
ADLS_ACUITY_SCORE: 18
ADLS_ACUITY_SCORE: 14
ADLS_ACUITY_SCORE: 18
ADLS_ACUITY_SCORE: 14
ADLS_ACUITY_SCORE: 18
ADLS_ACUITY_SCORE: 12
ADLS_ACUITY_SCORE: 18
ADLS_ACUITY_SCORE: 14
ADLS_ACUITY_SCORE: 14
ADLS_ACUITY_SCORE: 18
ADLS_ACUITY_SCORE: 14
ADLS_ACUITY_SCORE: 18
ADLS_ACUITY_SCORE: 14

## 2018-01-01 ASSESSMENT — ENCOUNTER SYMPTOMS
WHEEZING: 1
FATIGUE: 1

## 2018-01-13 NOTE — PATIENT INSTRUCTIONS
(J20.9) Acute bronchitis with symptoms > 10 days  (primary encounter diagnosis)    Comment:      Plan: Influenza A/B antigen, azithromycin (ZITHROMAX)          500 MG tablet                   (F17.200) Tobacco use disorder    Comment:      Plan:          (E78.5) Hyperlipidemia LDL goal <130    Comment:          Plan:           (M54.42,  G89.29) Chronic bilateral low back pain with left-sided sciatica    Comment:      Plan:           WORD ON FIRE     BISHOP ISIS JOHNSON

## 2018-01-13 NOTE — MR AVS SNAPSHOT
After Visit Summary   1/13/2018    Joni Muñoz    MRN: [id number]           Patient Information     Date Of Birth          1950        Visit Information        Provider Department      1/13/2018 9:00 AM Ike Luis MD [facility]        Today's Diagnoses     Acute bronchitis with symptoms > 10 days    -  1    Tobacco use disorder        Hyperlipidemia LDL goal <130        Chronic bilateral low back pain with left-sided sciatica          Care Instructions      (J20.9) Acute bronchitis with symptoms > 10 days  (primary encounter diagnosis)    Comment:      Plan: Influenza A/B antigen, azithromycin (ZITHROMAX)          500 MG tablet                   (F17.200) Tobacco use disorder    Comment:      Plan:          (E78.5) Hyperlipidemia LDL goal <130    Comment:          Plan:           (M54.42,  G89.29) Chronic bilateral low back pain with left-sided sciatica    Comment:      Plan:           WORD ON MEMO GUTIERREZON    [facility]          Follow-ups after your visit        Follow-up notes from your care team     Return in about 4 weeks (around 2/10/2018).      Who to contact     If you have questions or need follow up information about today's clinic visit or your schedule please contact [facility] directly at [phone].  Normal or non-critical lab and imaging results will be communicated to you by MyChart, letter or phone within 4 business days after the clinic has received the results. If you do not hear from us within 7 days, please contact the clinic through MyChart or phone. If you have a critical or abnormal lab result, we will notify you by phone as soon as possible.  Submit refill requests through [employer] or call your pharmacy and they will forward the refill request to us. Please allow 3 business days for your refill to be completed.          Additional Information About Your Visit        MyChart  "Information     [employer] lets you send messages to your doctor, view your test results, renew your prescriptions, schedule appointments and more. To sign up, go to [email or web address] . Click on \"Log in\" on the left side of the screen, which will take you to the Welcome page. Then click on \"Sign up Now\" on the right side of the page.     You will be asked to enter the access code listed below, as well as some personal information. Please follow the directions to create your username and password.     Your access code is: [email or web address]  Expires: 2018  9:47 AM     Your access code will  in 90 days. If you need help or a new code, please call your [facility] clinic or [phone].        Care EveryWhere ID     This is your Care EveryWhere ID. This could be used by other organizations to access your [facility] medical records  [id number]        Your Vitals Were     Pulse Temperature Pulse Oximetry BMI (Body Mass Index)          70 98  F (36.7  C) (Tympanic) 93% 23.6 kg/m2         Blood Pressure from Last 3 Encounters:   18 120/64   17 112/66   17 110/70    Weight from Last 3 Encounters:   18 164 lb 8 oz (74.6 kg)   17 162 lb (73.5 kg)   17 163 lb (73.9 kg)              We Performed the Following     Influenza A/B antigen          Today's Medication Changes          These changes are accurate as of: 18  9:47 AM.  If you have any questions, ask your nurse or doctor.               Start taking these medicines.        Dose/Directions    fluticasone 50 MCG/ACT spray   Commonly known as:  GNP FLUTICASONE PROPIONATE   Used for:  Acute bronchitis with symptoms > 10 days   Started by:  Ike Luis MD        Dose:  1 spray   Spray 1 spray into both nostrils daily   Quantity:  1 Bottle   Refills:  11         These medicines have changed or have updated prescriptions.        Dose/Directions    * azithromycin 500 MG tablet   Commonly known as:  ZITHROMAX   This may " have changed:  Another medication with the same name was added. Make sure you understand how and when to take each.   Used for:  Obstructive chronic bronchitis with exacerbation (H)   Changed by:  Ike Luis MD        Dose:  500 mg   Take 1 tablet (500 mg) by mouth daily   Quantity:  3 tablet   Refills:  0       * azithromycin 500 MG tablet   Commonly known as:  ZITHROMAX   This may have changed:  You were already taking a medication with the same name, and this prescription was added. Make sure you understand how and when to take each.   Used for:  Acute bronchitis with symptoms > 10 days   Changed by:  Ike Luis MD        Dose:  500 mg   Take 1 tablet (500 mg) by mouth daily   Quantity:  3 tablet   Refills:  1       * Notice:  This list has 2 medication(s) that are the same as other medications prescribed for you. Read the directions carefully, and ask your doctor or other care provider to review them with you.         Where to get your medicines      These medications were sent to [facility] [address] [place] [address] [address] [place] [id number]    Hours:  24-hours Phone:  [phone]     azithromycin 500 MG tablet    fluticasone 50 MCG/ACT spray                Primary Care Provider    Kodi Gonzalez MD       No address on file        Equal Access to Services     RICKY BENTON AH: Hadii lori trevinoo Solisette, waaxda luqadaha, qaybta kaalmada adeegyada, clifford romano. So [facility] [phone].    ATENCIÓN: Si habla español, tiene a beckett disposición servicios gratuitos de asistencia lingüística. Llame al [phone].    We comply with applicable federal civil rights laws and [place] laws. We do not discriminate on the basis of race, color, national origin, age, disability, sex, sexual orientation, or gender identity.            Thank you!     Thank you for choosing [facility]  for your care. Our goal is always to provide you with excellent care. Hearing back from our patients is one way we can continue to improve our services. Please take a few minutes to complete the written survey that you may receive in the mail after your visit with us. Thank you!             Your Updated Medication List - Protect others around you: Learn how to safely use, store and throw away your medicines at www.disposemymeds.org.          This list is accurate as of: 1/13/18  9:47 AM.  Always use your most recent med list.                   Brand Name Dispense Instructions for use Diagnosis    albuterol 108 (90 BASE) MCG/ACT Inhaler    PROAIR HFA    1 Inhaler    Inhale 2 puffs into the lungs every 6 hours    Wheezing       * azithromycin 500 MG tablet    ZITHROMAX    3 tablet    Take 1 tablet (500 mg) by mouth daily    Obstructive chronic bronchitis with exacerbation (H)       * azithromycin 500 MG tablet    ZITHROMAX    3 tablet    Take 1 tablet (500 mg) by mouth daily    Acute bronchitis with symptoms > 10 days       beclomethasone 80 MCG/ACT Inhaler    QVAR    1 Inhaler    Inhale 2 puffs into the lungs 2 times daily    Cough       * cyclobenzaprine 10 MG tablet    FLEXERIL    90 tablet    Take 1 tablet (10 mg) by mouth 3 times daily as needed for muscle spasms    Chronic bilateral low back pain with left-sided sciatica       * cyclobenzaprine 10 MG tablet    FLEXERIL    90 tablet    Take 1 tablet (10 mg) by mouth 3 times daily as needed for muscle spasms    Chronic bilateral low back pain with left-sided sciatica       doxepin 25 MG capsule    SINEquan    30 capsule    Take 1 capsule (25 mg) by mouth At Bedtime    Insomnia, unspecified type       DULoxetine 30 MG EC capsule    CYMBALTA    60 capsule    Take 1 capsule (30 mg) by mouth 2 times daily    Moderate episode of recurrent major depressive disorder (H)       fluticasone 50 MCG/ACT spray    GNP FLUTICASONE PROPIONATE    1 Bottle    Spray 1 spray  into both nostrils daily    Acute bronchitis with symptoms > 10 days       * gabapentin 600 MG tablet    NEURONTIN    180 tablet    TAKE 2 TABLETS BY MOUTH THREE TIMES DAILY    Chronic midline low back pain with sciatica, sciatica laterality unspecified       * gabapentin 600 MG tablet    NEURONTIN    180 tablet    TAKE 2 TABLETS BY MOUTH THREE TIMES DAILY    Chronic midline low back pain with sciatica, sciatica laterality unspecified       varenicline 0.5 MG X 11 & 1 MG X 42 tablet    CHANTIX STARTING MONTH [place]    53 tablet    Take 0.5 mg tab daily for 3 days, then 0.5 mg tab twice daily for 4 days, then 1 mg twice daily.    Obstructive chronic bronchitis with exacerbation (H), Simple chronic bronchitis (H)       zolpidem 5 MG tablet    AMBIEN    30 tablet    Take 1 tablet (5 mg) by mouth nightly as needed for sleep    Other insomnia       * Notice:  This list has 6 medication(s) that are the same as other medications prescribed for you. Read the directions carefully, and ask your doctor or other care provider to review them with you.

## 2018-01-13 NOTE — PROGRESS NOTES
SUBJECTIVE:   Joni Muñoz is a 67 year old male who presents to clinic today for the following health issues:      RESPIRATORY SYMPTOMS      Duration: 10 days    Description  rhinorrhea, cough, headache and phlem, weak, low appetite    Severity: moderate    Accompanying signs and symptoms: None    History (predisposing factors):  none    Precipitating or alleviating factors: None    Therapies tried and outcome:  none      Smoker  One ppd     Acute bronchitis     Shortness of breath     Some wheezing     No significant face pain     Anterior cervical lymph nodes within normal limits     Some poor teeth      Chronic gingivitis     Some sputum production    OSTEOARTHRITIS FLARE LEFT KNEE PAIN     BURSITIS LEFT KNEE PAIN     CHRONIC LOWER BACK PAIN NO RADICULOPATHY     UPPER BACK OK     NECK OK     SHOULDERS     NO HISTORY OF MYOCARDIAL INFARCTION     CHRONIC OBSTRUCTIVE LUNG DISEASE AND CHRONIC BRONCHITITIS    CHRONIC PAIN SYNDROME     RESOLVED SPRAINED TOE     DEPRESSION IMPROVED SISTER     FACETTED ARTHRITIS     LIFTING ACTIVITIES OF DAILY LIVING     SACROILIAC PAIN AT TIMES    RIGHT SIDED SHOULDER PAIN     LEFT SIDED ROTATOR CUFF SURGERY     .  Current Outpatient Prescriptions   Medication Sig Dispense Refill     azithromycin (ZITHROMAX) 500 MG tablet Take 1 tablet (500 mg) by mouth daily 3 tablet 1     fluticasone (GNP FLUTICASONE PROPIONATE) 50 MCG/ACT spray Spray 1 spray into both nostrils daily 1 Bottle 11     oseltamivir (TAMIFLU) 75 MG capsule Take 1 capsule (75 mg) by mouth 2 times daily 10 capsule 0     gabapentin (NEURONTIN) 600 MG tablet TAKE 2 TABLETS BY MOUTH THREE TIMES DAILY 180 tablet 2     cyclobenzaprine (FLEXERIL) 10 MG tablet Take 1 tablet (10 mg) by mouth 3 times daily as needed for muscle spasms 90 tablet 5     gabapentin (NEURONTIN) 600 MG tablet TAKE 2 TABLETS BY MOUTH THREE TIMES DAILY 180 tablet 2     cyclobenzaprine (FLEXERIL) 10 MG tablet Take 1 tablet (10 mg) by mouth 3 times daily  as needed for muscle spasms 90 tablet 5     albuterol (PROAIR HFA) 108 (90 BASE) MCG/ACT Inhaler Inhale 2 puffs into the lungs every 6 hours 1 Inhaler 5     azithromycin (ZITHROMAX) 500 MG tablet Take 1 tablet (500 mg) by mouth daily 3 tablet 0     varenicline (CHANTIX STARTING MONTH ) 0.5 MG X 11 & 1 MG X 42 tablet Take 0.5 mg tab daily for 3 days, then 0.5 mg tab twice daily for 4 days, then 1 mg twice daily. 53 tablet 0     doxepin (SINEQUAN) 25 MG capsule Take 1 capsule (25 mg) by mouth At Bedtime 30 capsule 1     zolpidem (AMBIEN) 5 MG tablet Take 1 tablet (5 mg) by mouth nightly as needed for sleep 30 tablet 5     beclomethasone (QVAR) 80 MCG/ACT Inhaler Inhale 2 puffs into the lungs 2 times daily 1 Inhaler 11     DULoxetine (CYMBALTA) 30 MG EC capsule Take 1 capsule (30 mg) by mouth 2 times daily 60 capsule 5          Allergies   Allergen Reactions     Acetaminophen Nausea       Immunization History   Administered Date(s) Administered     Influenza (IIV3) PF 10/06/2009, 10/14/2010, 10/03/2011, 2012, 2013     Influenza Vaccine IM 3yrs+ 4 Valent IIV4 10/01/2014     Pneumo Conj 13-V (2010&after) 2017     TDAP Vaccine (Adacel) 2017     Tdap (Adacel,Boostrix) 10/15/2007         reports that he does not drink alcohol.      reports that he does not use illicit drugs.    family history includes CEREBROVASCULAR DISEASE in his father; Unknown/Adopted in his mother. There is no history of DIABETES, Coronary Artery Disease, Hypertension, Hyperlipidemia, Breast Cancer, Colon Cancer, Prostate Cancer, Other Cancer, Depression, Anxiety Disorder, MENTAL ILLNESS, Substance Abuse, Anesthesia Reaction, Asthma, OSTEOPOROSIS, Genetic Disorder, Thyroid Disease, or Obesity.    indicated that the status of his no family hx of is unknown. He indicated that his mother is alive. He indicated that his father is . He indicated that both of his sisters are alive. He indicated that both of his brothers are  alive.      has a past surgical history that includes back surgery; hernia repair (1969); and orthopedic surgery.     reports that he currently engages in sexual activity and has had female partners.  .  Pediatric History   Patient Guardian Status     Not on file.     Other Topics Concern     Parent/Sibling W/ Cabg, Mi Or Angioplasty Before 65f 55m? Yes      Service No     Blood Transfusions No     Caffeine Concern No     Occupational Exposure No     Hobby Hazards No     Sleep Concern No     Stress Concern No     Weight Concern No     Special Diet No     Back Care Yes     Exercise Yes     Bike Helmet No     Seat Belt Yes     Self-Exams No     Social History Narrative         reports that he has been smoking Cigarettes.  He has a 15.00 pack-year smoking history. He uses smokeless tobacco.    Medical, social, surgical, and family histories reviewed.    Labs reviewed in EPIC  Patient Active Problem List   Diagnosis     Sprain of great toe     Chronic pain syndrome     Moderate episode of recurrent major depressive disorder (H)     Tobacco use disorder     Backache     Degeneration of lumbar or lumbosacral intervertebral disc     Intervertebral lumbar disc disorder with myelopathy, lumbar region     Facet arthritis of lumbar region (H)     Chronic low back pain     Sacroiliac dysfunction     Lumbago     Hyperlipidemia LDL goal <130     Cocaine abuse     Shoulder pain, right     Past Surgical History:   Procedure Laterality Date     BACK SURGERY       HERNIA REPAIR  1969     ORTHOPEDIC SURGERY      L arm        Social History   Substance Use Topics     Smoking status: Current Every Day Smoker     Packs/day: 0.50     Years: 30.00     Types: Cigarettes     Smokeless tobacco: Current User     Alcohol use No     Family History   Problem Relation Age of Onset     Unknown/Adopted Mother      CEREBROVASCULAR DISEASE Father      DIABETES No family hx of      Coronary Artery Disease No family hx of      Hypertension No  family hx of      Hyperlipidemia No family hx of      Breast Cancer No family hx of      Colon Cancer No family hx of      Prostate Cancer No family hx of      Other Cancer No family hx of      Depression No family hx of      Anxiety Disorder No family hx of      MENTAL ILLNESS No family hx of      Substance Abuse No family hx of      Anesthesia Reaction No family hx of      Asthma No family hx of      OSTEOPOROSIS No family hx of      Genetic Disorder No family hx of      Thyroid Disease No family hx of      Obesity No family hx of          Allergies   Allergen Reactions     Acetaminophen Nausea     Recent Labs   Lab Test  05/17/16   0924  04/16/13   0847   LDL   --   144*   HDL   --   53   TRIG   --   80   ALT  26  77*   CR  0.84  0.80   GFRESTIMATED  >90  Non  GFR Calc    >90   GFRESTBLACK  >90   GFR Calc    >90   POTASSIUM  3.5  4.6   TSH   --   1.55        BP Readings from Last 6 Encounters:   01/13/18 120/64   09/28/17 112/66   09/21/17 110/70   07/12/17 132/86   06/28/17 100/60   02/22/17 112/74       Wt Readings from Last 3 Encounters:   01/13/18 164 lb 8 oz (74.6 kg)   09/28/17 162 lb (73.5 kg)   09/21/17 163 lb (73.9 kg)         Positive symptoms or findings indicated by bold designation:     ROS: 10 point ROS neg other than the symptoms noted above in the HPI.except  has Sprain of great toe; Chronic pain syndrome; Moderate episode of recurrent major depressive disorder (H); Tobacco use disorder; Backache; Degeneration of lumbar or lumbosacral intervertebral disc; Intervertebral lumbar disc disorder with myelopathy, lumbar region; Facet arthritis of lumbar region (H); Chronic low back pain; Sacroiliac dysfunction; Lumbago; Hyperlipidemia LDL goal <130; Cocaine abuse; and Shoulder pain, right on his problem list.   Constitutional: The patient denied fatigue, fever, insomnia, night sweats, recent illness and weight loss.  ACUTE BRONCHITIS  SPUTUM PRODUCTION     Eyes: The  patient denied blindness, eye pain, eye tearing, photophobia, vision change and visual disturbance.       Ears/Nose/Throat/Neck: The patient denied dizziness, facial pain, hearing loss, nasal discharge, oral pain, otalgia, postnasal drip, sinus congestion, sore throat, tinnitus and voice change.   RHINITIS     Cardiovascular: The patient denied arrhythmia, chest pain/pressure, claudication, edema, exercise intolerance, fatigue, orthopnea, palpitations and syncope.      Respiratory: The patient denied asthma, chest congestion, cough, dyspnea on exertion, dyspnea/shortness of breath, hemoptysis, pedal edema, pleuritic pain, productive sputum, snoring and wheezing. ACUTE BRONCHITIS AND SPUTUM PRODUCTION      Gastrointestinal: The patient denied abdominal pain, anorexia, constipation, diarrhea, dysphagia, gastroesophageal reflux, hematochezia, hemorrhoids, melena, nausea and vomiting . NO NAUSEA OR DIARRHEA     Genitourinary/Nephrology: The patient denied breast complaint, dysuria, nocturia sexual dysfunction, t, urinary frequency, urinary incontinence, urinary urgency    NOCTURIA OCCASIONAL       Musculoskeletal: The patient denied arthralgia(s), back pain, joint complaint, muscle weakness, myalgias, osteoporosis, sciatica, stiffness and swelling.  CHRONIC LOWER BACK PAIN     Dermatoligic:: The patient denied acne, dermatitis, ecchymosis, itching, mole change, rash, skin cancer, skin lesion and sores.      Neurologic: The patient denied dizziness, gait abnormality, headache, memory loss, mental status change, paresis, paresthesia, seizure, syncope, tremor and vision change. OCCASIONAL HEADACHES      Psychiatric: The patient denied anxiety, depression, disturbances of memory, drug abuse, insomnia, mood swings and relationship difficulties.  DEPRESSION     Endocrine: The patient denied , goiter, obesity, polyuria and thyroid disease.      Hematologic/Lymphatic: The patient denied abnormal bleeding and bruising, abnormal  ecchymoses, anemia, lymph node enlargement/mass, petechiae and venous  Thrombosis.      Allergy/Immunology: The patient denied food allergy and  Allergic rhinitis or conjunctivitis.        PE:  /64  Pulse 70  Temp 98  F (36.7  C) (Tympanic)  Wt 164 lb 8 oz (74.6 kg)  SpO2 93%  BMI 23.6 kg/m2 Body mass index is 23.6 kg/(m^2).    Constitutional: general appearance, well nourished, well developed, in no acute distress, well developed, appears stated age, normal body habitus,      Eyes:; The patient has normal eyelids sclerae and conjunctivae : NORMAL      Ears/Nose/Throat: external ear, overall: normal appearance; external nose, overall: benign appearance, normal moujth gums and lips  The patient has:  NORMAL     Neck: thyroid, overall: normal size, normal consistency, nontender,  NORMAL     Respiratory:  palpation of chest, overall: normal excursion, NORMAL   Clear to percussion and auscultation  RHONCHI     Tachypnea  NORMAL  Color  NORMAL     Cardiovascular:  Good color with no peripheral edema    Regular sinus rhythm without murmur. Physiologic heart sounds Heart is unelarged  .   Chest/Breast: normal shape       Abdominal exam,  Liver and spleen are  unenlarged  NORMAL       Tenderness NORMAL   Scars  NORMAL     Urogenital; no renal, flank or bladder  tenderness;  NORMAL     Lymphatic: neck nodes,  NORMAL    Other nodes NORMAL     Musculoskeletal:  Brief ortho exam normal except:   OSTEOARTHRITIS KNEES    Integument: inspection of skin, no rash, lesions; and, palpation, no induration, no tenderness.      Neurologic mental status, overall: alert and oriented; gait, no ataxia, no unsteadiness; coordination, no tremors; cranial nerves, overall: normal motor, overall: normal bulk, tone.      Psychiatric: orientation/consciousness, overall: oriented to person, place and time; behavior/psychomotor activity, no tics, normal psychomotor activity; mood and affect, overall: normal mood and affect; appearance,  overall: well-groomed, good eye contact; speech, overall: normal quality, no aphasia and normal quality, quantity, intact.      Diagnostic Test Results:  Results for orders placed or performed in visit on 01/13/18   Influenza A/B antigen   Result Value Ref Range    Influenza A/B Agn Specimen Nasal     Influenza A Positive (A) NEG^Negative    Influenza B Negative NEG^Negative         ICD-10-CM    1. Acute bronchitis with symptoms > 10 days J20.9 Influenza A/B antigen     azithromycin (ZITHROMAX) 500 MG tablet     fluticasone (GNP FLUTICASONE PROPIONATE) 50 MCG/ACT spray     oseltamivir (TAMIFLU) 75 MG capsule    FLU   2. Tobacco use disorder F17.200    3. Hyperlipidemia LDL goal <130 E78.5    4. Chronic bilateral low back pain with left-sided sciatica M54.42     G89.29    5. Influenza A J10.1         .  Side effects benefits and risks thoroughly discussed. .he may come in early if unimproved or getting worse      Importance of adhering to regimen discussed and if medications were dispensed, the importance of taking medications discussed and bringing in the medications after every visit for chronic problems     Please drink 2 glasses of water prior to meals and walk 15-30 minutes after meals    I spent 25 MINUTES SPENT  with patient discussing the following issues   The primary encounter diagnosis was Acute bronchitis with symptoms > 10 days. Diagnoses of Tobacco use disorder, Hyperlipidemia LDL goal <130, Chronic bilateral low back pain with left-sided sciatica, and Influenza A were also pertinent to this visit. over half of which involved counseling and coordination of care.    Patient Instructions   (J20.9) Acute bronchitis with symptoms > 10 days  (primary encounter diagnosis)  Comment:    Plan: Influenza A/B antigen, azithromycin (ZITHROMAX)        500 MG tablet             (F17.200) Tobacco use disorder  Comment:    Plan:      (E78.5) Hyperlipidemia LDL goal <130  Comment:        Plan:       (M54.42,  G89.29)  Chronic bilateral low back pain with left-sided sciatica  Comment:    Plan:       WORD ON MEMO JOHNSON      ALL THE ABOVE PROBLEMS ARE STABLE AND MED CHANGES AS NOTED    Diet:  MEDITERRANEAN DIET     Exercise:    Exercises Range of motion, balance, isometric, and strengthening exercises 30 repetitions twice daily of involved joints      .HALLIE TREJO MD 1/13/2018 9:38 AM  January 13, 2018

## 2018-01-13 NOTE — NURSING NOTE
"Chief Complaint   Patient presents with     URI       Initial /64  Pulse 70  Temp 98  F (36.7  C) (Tympanic)  Wt 164 lb 8 oz (74.6 kg)  SpO2 93%  BMI 23.6 kg/m2 Estimated body mass index is 23.6 kg/(m^2) as calculated from the following:    Height as of 9/21/17: 5' 10\" (1.778 m).    Weight as of this encounter: 164 lb 8 oz (74.6 kg).  Medication Reconciliation: complete   .Leonel GALLARDO      "

## 2018-01-22 PROBLEM — G93.40 ENCEPHALOPATHY ACUTE: Status: ACTIVE | Noted: 2018-01-01

## 2018-01-22 NOTE — ED NOTES
Bed: ST02  Expected date:   Expected time:   Means of arrival:   Comments:  Dayana 436 fever 67 male

## 2018-01-22 NOTE — ED PROVIDER NOTES
History     Chief Complaint:  Altered mental status and hypoxia    History limited due to acuity of condition and subsequently provided by EMS.      HPI   Joni Muñoz is a 67 year old male with a history of substance abuse who presents to the emergency department today via EMS for evaluation of altered mental status and hypoxia. The patient was seen in clinic on January 13th and was diagnosed with bronchitis. He was supposed to follow up in the next four weeks. EMS reports was found by his family lying in minimal response yesterday and family thought he just needed to sleep. He is a methadone clinic patient and has not gone in for the past couple of days with increased deterioration prompting concern. He was found this morning in the same condition prompting a call to EMS and his visit to the emergency department. En route, his sats were in the 70s, blood sugar at 96, and sounded congested with wheezing. He was unresponsive with minimal moans to pain. At arrival, he remained unresponsive and was put on 10 L/min of oxygen. Of note, he lives in the basement of his sister's house.      Niece Nataliia: [phone]    Allergies:  Acetaminophen    Medications:    azithromycin (ZITHROMAX) 500 MG tablet  albuterol (PROAIR HFA) 108 (90 BASE) MCG/ACT Inhaler  doxepin (SINEQUAN) 25 MG capsule  beclomethasone (QVAR) 80 MCG/ACT Inhaler  DULoxetine (CYMBALTA) 30 MG EC capsule    Past Medical History:    Allergic rhinitis, cause unspecified   Back pain, chronic   Depressive disorder   Scoliosis (and kyphoscoliosis), idiopathic   Substance abuse   Tobacco use disorder     Past Surgical History:    Back surgery  Hernia repair  Orthopedic surgery    Family History:    Family history is unknown. Patient was adopted.     Social History:  The patient was alone.  Smoking Status: Current every day smoker  Smokeless Tobacco: Current user  Alcohol Use: No  Marital Status:  Single     Review of Systems   Unable to perform  ROS: Mental status change   Constitutional: Positive for fatigue.   Respiratory: Positive for wheezing.         Hypoxic       Physical Exam   First Vitals:  BP: (!) 175/110  Heart Rate: 90  Resp: 16  Weight: 74.6 kg (164 lb 7.4 oz) (in computer from previous visit)  SpO2: 94 %      Physical Exam  Physical Exam   General: laying minimally responsive on the bed  Head: No obvious trauma to head.  Ears, Nose, Throat:  External ears normal.  Nose normal. Dry MMM  Eyes:  Conjunctivae clear.  Pupils are equal, round, and reactive about 2-3 mm.   Neck: Normal range of motion.  Neck supple.   CV: Regular rate and rhythm.  No murmurs.      Respiratory: congested breath sounds with diffuse wheezing appreciable.    Gastrointestinal: Soft.  No distension.   Neuro: responds to painful stimuli, non verbal, moaning.    Skin: Skin is warm and dry.  No rash noted.     Emergency Department Course   ECG:  Indication: Hypoxia  Completed at 0851.  Read at 0924.   Normal sinus rhythm  Normal ECG   No significant change compared to EKG dated 11/16/16  Rate 93 bpm. PA interval 128. QRS duration 88. QT/QTc 354/440. P-R-T axes 45  -26  54.    Imaging:  Radiology findings were communicated with the patient who voiced understanding of the findings.  XR Chest Port 1 view  IMPRESSION: Linear atelectasis or fibrosis at the left lung base.  Additional mild hazy opacity of the left lung base posterior to the  heart could represent pneumonia or aspiration.  Report per radiology     XR chest port 1 view  IMPRESSION: An endotracheal tube terminates 8.7 cm above the joyce.  There is patchy opacification of the left lung base, worsened compared  to prior exam. No pneumothorax.  Report per radiology     Head CT w/o contrast   IMPRESSION:  1. Chronic intraparenchymal brain changes. No evidence for any acute  intracranial process.  2. Extensive paranasal sinus disease with fluid levels. An acute  sinusitis could be present. Clinical correlation  "suggested.  Report per radiology     Chest XR 1 view portable  IMPRESSION: The endotracheal tube terminates 6.5 cm from the joyce.  Nasogastric tube extends into the stomach beyond the lower margin the  study. Patchy opacification of left lung base as before. No  Pneumothorax.  Report per radiology     Laboratory:  Laboratory findings were communicated with the patient who voiced understanding of the findings.  CBC: WBC 19.0 (H) o/w WNL. (HGB 17.1, )   Lactic Acid (collected 0848): 2.6 (H)  Lactic Acid (collected 1014): 2.1 (H)  Blood gas venous (collected 0848): pH Venous 7.33, PCO2 Venous 52 (H), PO2 Venous 47, Bicarbonate 27, Base Deficit 0.1, FIO2 77   Blood gas venous (collected 1020): pH Venous 7.27 (L), PCO2 Venous 48 (H), PO2 Venous 210 (H), Bicarbonate 22, Base Deficit 5.1, FIO2 80%   CMP: Creatinine 1.94 (H), BUN 34 (H), GFR estimate 35 (L), calcium 8.2 (L), albumin 3.3 (L), alkphos 153 (H),  (HH), AST 1225 (HH) o/w WNL  Influenza A/B Antigen: Negative  UA: Clear, yellow urine, mucous present o/w WNL    Blood cultures: Pending  Urine Culture Aerobic Bacterial: Pending  Sputum culture aerobic bacterial: Pending    Procedures:  [facility] Procedure Note          Intubation:     Date/Time: 09:02 AM  Performed by: Lynn Pineda MD    The procedure was performed in an emergent situation.  Risks and benefits: risks, benefits and alternatives were discussed.  Patient identity confirmed: verbally with patient and arm band  Time out: Immediately prior to procedure a \"time out\" was called to verify the correct patient, procedure, equipment, support staff and site/side marked as required.    Indication: Acute respiratory failure. and Airway protection.    Intubation method: Glidescope  Patient status: RSI  Preoxygenation: Mask  Pretreatment medications: None  Sedatives: Etomidate  Paralytic: succinylcholine  Laryngoscope size: Mac 4  Tube size: 7.5 cuffed with cuff inflated after " placement  Number of attempts: 1  Cricoid pressure: yes  Cords visualized: yes, difficult with bougie   Post-procedure assessment: Breath sounds equal bilaterally with chest rise and absent over the epigastrium, Chest x-ray interpreted by me demonstrating endotracheal tube in appropriate position and CO2 detector.  ETT to teeth: 24 cm  Tube secured with: ETT soto    Patient tolerated the procedure well with no immediate complications.  Complications:  None        Interventions:  0858 NS Bolus 1,000mL IV  0901 etomidate 25 mg IV  0917 Propofol 10mcg/kg/min IV  0952 succinylcholine 150 mg IV  0958 Zosyn 4.5 g IV  1004 Vancomycin 1500 mg IV  1018 sodium chloride infusion 150 mg/hr IV    Emergency Department Course:  Nursing notes and vitals reviewed.  IV was inserted and blood was drawn for laboratory testing, results above.  The patient was sent for a XR chest port 1 view x3 and Head CT w/o contrast while in the emergency department, results above.   0840: I performed an exam of the patient as documented above.   0842: RT was called along with portable x-ray.   0845: On oxygen, patient's sats were 90%.  0850: Chest x-ray was performed and read.   0853: Sats at 89%.   0853: I ordered 25 cc's of Etomidate and 150 cc's of succinylcholine.   0854: EKG was performed and read.  0855: BiPap was started.  0857: Sats at 91% and blood pressure at 159/108.   0901: 25 cc's of Etomidate was administered.  0901: 150 cc's of succinylcholine was administered.  0902: I intubated the patient with a 7.5 tube as noted above.  0902: Sats at 93%.   0904: CO2 was at 34.  0906: Sats at 95%  0941: Patient rechecked and updated.   0946: Patient rechecked and updated.   1034:  I spoke with Dr. Justice of the hospitalist service regarding patient's presentation, findings, and plan of care.  Findings and plan explained to the Patient who consents to admission. Discussed the patient with Dr. Justice, who will admit the patient to a ICU [facility] bed  for further monitoring, evaluation, and treatment.  I personally reviewed the laboratory and imaging results with the Patient and answered all related questions prior to admission.      Impression & Plan    CMS Diagnoses:   The patient has signs of Severe Sepsis as evidenced by:    1. 2 SIRS criteria, AND  2. Suspected infection, AND   3. Organ dysfunction: Lactic Acid >2    Time severe sepsis diagnosis confirmed = 0848 as this was the time when Lactate resulted, and the level was >2      3 Hour Severe Sepsis Bundle Completion:  1. Initial Lactic Acid Result:   Recent Labs   Lab Test  01/22/18   0848   LACT  2.6*     2. Blood Cultures before Antibiotics: Yes  3. Broad Spectrum Antibiotics Administered: Yes     Anti-infectives (Future)    Start     Dose/Rate Route Frequency Ordered Stop    01/22/18 0943  vancomycin (VANCOCIN) 1500 mg in 0.9% NaCl 250 mL PREMIX      1,500 mg  166.7 mL/hr over 90 Minutes Intravenous ONCE 01/22/18 0942          4. 2200 ml of IV fluids.      6 Hour Severe Sepsis Bundle Completion:  1. Repeat Lactic Acid Level: 2.1  2. MAP>65 after initial IVF bolus, will continue to monitor fluid status and vital signs       Medical Decision Making:  Joni Muñoz is a 67 year old male with a history of bronchitis and substance abuse presenting with altered mental status and respiratory failure. Vital signs notable for hypoxic 79% on room air per medics with hypoxia in the emergency department at 90% with face mask and nasal cannula. Broad differential pursued including but not limited to pneumonia, acute intracranial hemorrhage, meningitis and encephalitis, hypoxic brain injury, influenza, bronchitis, electrolyte metabolic abnormality, etc. Initially on presentation, patient was only minimally responsive to painful stimuli. He did respond in bilateral upper extremities and was able to breathe on his own, but having low oxygen saturations despite high supplemental oxygen. Patient dipped down to 89% on  face mask and nasal cannula. Decision was made to intubate for airway protection and respiratory failure. Patient had improved saturations following intubation but had thick purulent appearing drainage through the vocal cords. Initial chest x-ray shows infiltrates in the left lower lobe concerning for pneumonia. Given his hypoxia, respiratory failure and concerning chest x-rays, vancomycin and Zosyn were administered. He of note, has recently been on azithromycin so therefore broader spectrum antibiotics were started with. He tolerated intubation well and was significantly improved. His initial VBG was largely unremarkable with a pH of 7.33 and only some mild hypercapnia at 52 which is somewhat reasonable given it was a venous blood gas. His lactate was elevated at 2.6 concerning for severe sepsis considering his setting of his white count elevation to 19. His x-ray findings and his evidence of acute kidney and acute liver injury. Influenza swab was negative. Repeat lactate did improve after fluid 30 mg/kilo hydration to 2.1. I suspect some poor clearance of his lactate secondary to his shock liver. I suspect ALT, AST and Alkphos elevation are all secondary to shock liver given that he is likely hypoxic and unresponsive for two days. His acute kidney injury is likely from poor hydration as well secondary to his lack of movement for two days. Otherwise his electrolyte and metabolic abnormalities are unremarkable. There is no indication for dialysis at this point. Related to his altered mental status, a head CT was obtained that shows only chronic intraparenchymal changes but no acute findings. Concern for ongoing hypoxic injury given that he is likely hypoxic for 48 hours. This will be continued to be monitored. While intubation and oxygenation improved, he was moving all four extremities more and biting his tube. He appeared to have some improvement in his mental status. Of note, his ET tube was advanced after the  initial x-ray showed how high it was. It is now improved to 6.5 above the joyce. His NG tube is in appropriate place. Patient was discussed with intensivist who agrees with current plan. Patient will be continued on support. No acute care.     Critical Care time:  was 30 minutes for this patient excluding procedures.    Diagnosis:    ICD-10-CM    1. Acute respiratory failure with hypoxia (H) J96.01    2. Pneumonia of left lower lobe due to infectious organism (H) J18.1    3. Severe sepsis (H) A41.9     R65.20    4. Tobacco use disorder F17.200    5. Endotracheally intubated Z97.8    6. Acute renal failure, unspecified acute renal failure type (H) N17.9    7. Liver failure without hepatic coma, unspecified chronicity (H) K72.90        Disposition:  Admitted to ICU [facility] bed with Dr. Justice    Scribe Disclosure:  Moy WHITNEY, am serving as a scribe at 9:11 AM on 1/22/2018 to document services personally performed by Lynn Pineda MD based on my observations and the provider's statements to me.     1/22/2018    EMERGENCY DEPARTMENT       Lynn Pineda MD  01/22/18 2860

## 2018-01-22 NOTE — IP AVS SNAPSHOT
"` `           [facility] [address]: [phone]                                              INTERAGENCY TRANSFER FORM - NURSING   2018                    Hospital Admission Date: 2018  GUADALUPE JEWELL   : 1950  Sex: Male        Attending Provider: Jadiel Justice MD     Allergies:  Acetaminophen    Infection:  None   Service:  HOSPITALIST    Ht:  1.778 m (5' 10\")   Wt:  67.4 kg (148 lb 9.4 oz)   Admission Wt:  74.6 kg (164 lb 7.4 oz)    BMI:  21.32 kg/m 2   BSA:  1.82 m 2            Patient PCP Information     Provider PCP Type    Kodi Gonzalez MD General      Current Code Status     Date Active Code Status Order ID Comments User Context       Prior      Code Status History     Date Active Date Inactive Code Status Order ID Comments User Context    2018  2:32 PM  DNR/DNI [id number]  Nikhil Araya DO Outpatient    2018 10:52 AM 2018  2:32 PM DNR/DNI [id number] Code status discussion is appropriately documented in the chart. Tiffanie Kim, JUSTIN CNP Inpatient    2018 12:22 PM 2018 10:51 AM Full Code [id number]  Jadiel Justice MD Inpatient    2004 10:33 AM 2004 10:33 AM  None  Brenda Estrada      Advance Directives        Scanned docmt in ACP Activity?           No        Hospital Problems as of 2018              Priority Class Noted POA    Encephalopathy acute Medium  2018 Yes      Non-Hospital Problems as of 2018              Priority Class Noted    Sprain of great toe Medium  2012    Chronic pain syndrome Medium  2012    Moderate episode of recurrent major depressive disorder (H) Medium  10/10/2012    Tobacco use disorder Medium  10/10/2012    Backache Medium  10/10/2012    Degeneration of lumbar or lumbosacral intervertebral disc Medium  10/10/2012    Intervertebral lumbar disc disorder with myelopathy, lumbar region Medium  10/10/2012    Facet arthritis of " lumbar region (H) Medium  12/31/2012    Chronic low back pain Medium  12/31/2012    Sacroiliac dysfunction Medium  12/31/2012    Lumbago Medium  4/16/2013    Hyperlipidemia LDL goal <130 Medium  7/9/2013    Cocaine abuse Medium  1/20/2015    Shoulder pain, right Medium  1/19/2016      Immunizations     Name Date      Influenza (IIV3) PF 11/18/13     Influenza (IIV3) PF 09/24/12     Influenza (IIV3) PF 10/03/11     Influenza (IIV3) PF 10/14/10     Influenza (IIV3) PF 10/06/09     Influenza Vaccine IM 3yrs+ 4 Valent IIV4 10/01/14     Pneumo Conj 13-V (2010&after) 06/28/17     TDAP Vaccine (Adacel) 06/28/17     Tdap (Adacel,Boostrix) 10/15/07          END      ASSESSMENT     Discharge Profile Flowsheet     EXPECTED DISCHARGE     Inspection of bony prominences  Full 01/31/18 1124    Expected Discharge Date  01/31/18 (hospice when bed available) 01/30/18 1507   Full except areas not inspected   Buttock, left;Buttock, right;Sacrum;Coccyx 01/26/18 2222    DISCHARGE NEEDS ASSESSMENT     Inspection under devices  Full 01/31/18 0638    Anticipated Changes Related to Illness  none 01/22/18 1254   Not Inspected under devices.  ETT and stabilizer 01/26/18 0524    # of Referrals Placed by CTS  Scheduled Follow-up appointments 05/17/16 0940   Skin WDL  ex;characteristics 01/31/18 1124    GASTROINTESTINAL (ADULT,PEDIATRIC,OB)     Skin Color/Characteristics  bruised (ecchymotic);redness nonblanchable 01/31/18 1124    GI WDL  ex 01/31/18 1331   Skin Temperature  -- (cool feet) 01/31/18 1124    All Quadrants Bowel Sounds  audible and active in all quadrants 01/31/18 0638   Skin Moisture  dry 01/31/18 1124    Last Bowel Movement  01/31/18 01/31/18 1331   Skin Integrity  bruise(s);wound(s) 01/31/18 1124    GI Signs/Symptoms  fecal incontinence 01/31/18 1331   SAFETY      Passing flatus  yes 01/31/18 1331   Safety WDL  WDL 01/31/18 1124    COMMUNICATION ASSESSMENT     Safety Equipment  suction equipment 01/31/18 1125    Patient's  "communication style  spoken language (English or Bilingual) 01/22/18 1254   All Alarms  alarm(s) activated and audible 01/31/18 1124    SKIN                        Assessment WDL (Within Defined Limits) Definitions           Safety WDL     Effective: 09/28/15    Row Information: <b>WDL Definition:</b> Bed in low position, wheels locked; call light in reach; upper side rails up x 2; ID band on<br> <font color=\"gray\"><i>Item=AS safety wdl>>List=AS safety wdl>>Version=F14</i></font>      Skin WDL     Effective: 09/28/15    Row Information: <b>WDL Definition:</b> Warm; dry; intact; elastic; without discoloration; pressure points without redness<br> <font color=\"gray\"><i>Item=AS skin wdl>>List=AS skin wdl>>Version=F14</i></font>      Vitals     Vital Signs Flowsheet     COMMENTS     Compliance w/ventilator (intubated patients)  Extubated 01/26/18 0923    Comments  Arrived from ED 01/22/18 1214   Vocalization (extubated patients)  0 01/26/18 0923    VITAL SIGNS     Muscle Tension  0 01/26/18 0923    Temp  97.5  F (36.4  C) 01/31/18 1538   Total  0 01/26/18 0923    Temp src  Axillary 01/31/18 1538   ANALGESIA SIDE EFFECTS MONITORING      Resp  18 01/31/18 1538   Side Effects Monitoring: Respiratory Quality  I 01/27/18 2115    Pulse  81 01/31/18 1538   Side Effects Monitoring: Respiratory Depth  S 01/27/18 2115    Heart Rate  83 01/30/18 0756   Side Effects Monitoring: Sedation Level  1 01/27/18 2115    Pulse/Heart Rate Source  Monitor 01/31/18 1538   HEIGHT AND WEIGHT      BP  145/89 01/31/18 1538   Height  1.778 m (5' 10\") 01/22/18 1504    BP Location  Left arm 01/31/18 1538   Weight  67.4 kg (148 lb 9.4 oz) 01/30/18 0539    OXYGEN THERAPY     Weight Method  Bed scale 01/30/18 0539    SpO2  93 % 01/31/18 0626   BSA (Calculated - sq m)  1.86 01/22/18 1504    O2 Device  Oxymask 01/31/18 0626   BMI (Calculated)  22.28 01/22/18 1504    FiO2 (%)  40 % 01/25/18 1146   RESPIRATORY MONITORING      Oxygen Delivery  4 LPM 01/31/18 " 0626   Respiratory Monitoring (EtCO2)  29 mmHg 01/22/18 1207    PAIN/COMFORT     MARIE COMA SCALE      Patient Currently in Pain  denies 01/31/18 1353   Best Eye Response  4-->(E4) spontaneous 01/28/18 2242    Preferred Pain Scale  FLACC (Face Legs Arms Cry Consolability Scale) 01/30/18 1005   Best Motor Response  5-->(M5) localizes pain 01/28/18 2242    0-10 Pain Scale  7 01/27/18 1127   Best Verbal Response  2-->(V2) incomprehensible speech 01/28/18 2242    Pain Intervention(s)  Medication (See eMAR) 01/27/18 1811   Marie Coma Scale Score  11 01/28/18 2242    Response to Interventions  Decrease in pain 01/27/18 1811   POSITIONING      PAIN ASSESSMENT/FLACC     Body Position  side-lying, right 01/31/18 1353    Pain Rating: FLACC (rest) - Face  0 01/30/18 2012   Head of Bed (HOB)  HOB at 30-45 degrees 01/31/18 1124    Pain Rating: FLACC (rest) - Legs  0 01/30/18 2012   Positioning/Transfer Devices  pillows 01/31/18 1124    Pain Rating: FLACC (rest) - Activity  0 01/30/18 2012   DAILY CARE      Pain Rating: FLACC (rest) - Cry  0 01/30/18 2012   Activity Management  activity adjusted per tolerance 01/31/18 1124    Pain Rating: FLACC (rest) - Consolability  0 01/30/18 2012   Activity Assistance Provided  assistance, 2 people 01/31/18 1124    Score: FLACC (rest)  0 01/30/18 2012   Assistive Device Utilized  mechanical lift 01/31/18 0136    Pain Rating: FLACC (Activity) - Face  0 01/28/18 0328   ECG      Pain Rating: FLACC (Activity) - Legs  0 01/28/18 0328   ECG Rhythm  Sinus rhythm 01/26/18 0923    Pain Rating: FLACC (Activity) - Activity  0 01/28/18 0328   Lead Monitored  Lead II;V 1 01/26/18 0923    Pain Rating: FLACC (Activity) - Cry  0 01/28/18 0328   Ectopy  None 01/26/18 0923    Pain Rating: FLACC (Activity) - Consolability  0 01/28/18 0328   Equipment  electrodes changed 01/25/18 0254    Score: FLACC (activity)  0 01/28/18 0328   POINT OF CARE TESTING      CRITICAL-CARE PAIN OBSERVATION TOOL (CPOT)      Puncture Site  fingertip 01/22/18 1601    Facial Expression  0 01/26/18 0923   Bedside Glucose (mg/dl )   93 mg/dl 01/22/18 1601    Body Movements  0 01/26/18 0923                 Patient Lines/Drains/Airways Status    Active LINES/DRAINS/AIRWAYS     Name: Placement date: Placement time: Site: Days: Last dressing change:    Urethral Catheter Temperature probe 16 fr 01/22/18   1010   Temperature probe   9     Peripheral IV 01/30/18 Right Lower forearm 01/30/18   0500   Lower forearm   1             Patient Lines/Drains/Airways Status    Active PICC/CVC     None            Intake/Output Detail Report     Date Intake         Output   Net    Shift P.O. I.V. NG/GT IV Piggyback Enteral Total Urine Emesis/NG output Total       Day 01/30/18 0700 - 01/30/18 1459 0 1179 451 -- 1547 3177 450 -- 450 2727    Sabrina 01/30/18 1500 - 01/30/18 2259 -- -- -- -- -- -- 525 -- 525 -525    Noc 01/30/18 2300 - 01/31/18 0659 -- -- -- -- -- -- 500 -- 500 -500    Day 01/31/18 0700 - 01/31/18 1459 120 -- -- -- -- 120 450 -- 450 -330    Sabrina 01/31/18 1500 - 01/31/18 2259 -- -- -- -- -- -- -- -- -- 0      Last Void/BM       Most Recent Value    Urine Occurrence     Stool Occurrence 1 at 01/31/2018 0445      Case Management/Discharge Planning     Case Management/Discharge Planning Flowsheet     REFERRAL INFORMATION     Anticipated Changes Related to Illness  none 01/22/18 1254    # of Referrals Placed by CTS  Scheduled Follow-up appointments 05/17/16 0940   ABUSE RISK SCREEN      LIVING ENVIRONMENT     QUESTION TO PATIENT:  Has a member of your family or a partner(now or in the past) intimidated, hurt, manipulated, or controlled you in any way?  patient declined to answer or is unable to answer 01/22/18 0849    Lives With  sibling(s) 01/22/18 1402   QUESTION TO PATIENT: Do you feel safe going back to the place where you are living?  patient declined to answer or is unable to answer 01/22/18 0849    COPING/STRESS     OBSERVATION: Is there  reason to believe there has been maltreatment of a vulnerable adult (ie. Physical/Sexual/Emotional abuse, self neglect, lack of adequate food, shelter, medical care, or financial exploitation)?  no 01/22/18 0849    Major Change/Loss/Stressor  unable to assess 01/22/18 1402   OTHER      EXPECTED DISCHARGE     Are you depressed or being treated for depression?  No (janet) 01/22/18 1402    Expected Discharge Date  01/31/18 (hospice when bed available) 01/30/18 1507   HOMICIDE RISK      DISCHARGE PLANNING     Feels Like Hurting Others  no 01/22/18 0842

## 2018-01-22 NOTE — IP AVS SNAPSHOT
MRN:[id number]                      After Visit Summary   1/22/2018    Joni Muñoz    MRN: [id number]           Thank you!     Thank you for choosing [facility] for your care. Our goal is always to provide you with excellent care. Hearing back from our patients is one way we can continue to improve our services. Please take a few minutes to complete the written survey that you may receive in the mail after you visit with us. Thank you!        Patient Information     Date Of Birth          1950        Designated Caregiver       Most Recent Value    Caregiver    Will someone help with your care after discharge? yes    Name of designated caregiver Nataliia Quiñonez    Phone number of caregiver     Caregiver address see contact info      About your hospital stay     You were admitted on:  January 22, 2018 [facility]    You were discharged on:  January 31, 2018        Reason for your hospital stay       Acute hypoxic respiratory failure                  Who to Call     For medical emergencies, please call 911.  For non-urgent questions about your medical care, please call your primary care provider or clinic, None          Attending Provider     Provider Specialty    Lynn Pineda MD Emergency Medicine    [facility]adiel Joe MD Critical Care Medicine       Primary Care Provider    Kodi Gonzalez MD      After Care Instructions     Activity - Up ad lety           General info for SNF       Length of Stay Estimate: Long Term Care  Condition at Discharge: Terminal  Level of care:skilled   Rehabilitation Potential: Poor  Admission H&P remains valid and up-to-date: Yes  Recent Chemotherapy: N/A  Use Nursing Home Standing Orders: Yes                  Pending Results     No orders found from 1/20/2018 to 1/23/2018.            Statement of Approval     Ordered          01/31/18 1433  I have reviewed and agree with  "all the recommendations and orders detailed in this document.  EFFECTIVE NOW     Approved and electronically signed by:  Nikhil Araya DO             Admission Information     Date & Time Provider Department Dept. Phone    2018 Jadiel Justice MD [address] [facility] [phone]      Your Vitals Were     Blood Pressure Pulse Temperature Respirations Height Weight    145/89 (BP Location: Left arm) 81 97.5  F (36.4  C) (Axillary) 18 1.778 m (5' 10\") 67.4 kg (148 lb 9.4 oz)    Pulse Oximetry BMI (Body Mass Index)                93% 21.32 kg/m2          MyChart Information     [employer] lets you send messages to your doctor, view your test results, renew your prescriptions, schedule appointments and more. To sign up, go to [email or web address] . Click on \"Log in\" on the left side of the screen, which will take you to the Welcome page. Then click on \"Sign up Now\" on the right side of the page.     You will be asked to enter the access code listed below, as well as some personal information. Please follow the directions to create your username and password.     Your access code is: [email or web address]  Expires: 2018  9:47 AM     Your access code will  in 90 days. If you need help or a new code, please call your [facility] clinic or [phone].        Care EveryWhere ID     This is your Care EveryWhere ID. This could be used by other organizations to access your [facility] medical records  [id number]        Equal Access to Services     [facility] : Hadii lori beltrán Solisette, waaxda luqadaha, qaybta kaalmaclifford begum idiin hayaan adeeg kharash la'aan . So [facility] [phone].    ATENCIÓN: Si habla español, tiene a beckett disposición servicios gratuitos de asistencia lingüística. Llame al [phone].    We comply with applicable federal civil rights laws and [place] laws. We do not discriminate on the basis of race, color, national origin, age, disability, " sex, sexual orientation, or gender identity.               Review of your medicines      START taking        Dose / Directions    * acetaminophen 32 mg/mL solution   Commonly known as:  TYLENOL   Used for:  Acute respiratory failure with hypoxia (H)        Dose:  650 mg   20.3 mLs (650 mg) by Per Feeding Tube route every 6 hours as needed for mild pain or fever   Quantity:  300 mL   Refills:  0       * acetaminophen 650 MG Suppository   Commonly known as:  TYLENOL   Used for:  Acute respiratory failure with hypoxia (H)        Dose:  650 mg   Place 1 suppository (650 mg) rectally every 6 hours as needed for mild pain or fever (temperature over 100? F )   Quantity:  60 suppository   Refills:  0       bisacodyl 10 MG Suppository   Commonly known as:  DULCOLAX   Used for:  Acute respiratory failure with hypoxia (H)        Dose:  10 mg   Place 1 suppository (10 mg) rectally daily as needed for other (for no bowel movement for 72 hours)   Quantity:  30 suppository   Refills:  0       haloperidol 2 MG/ML (HIGH CONC) solution   Commonly known as:  HALDOL   Used for:  Acute respiratory failure with hypoxia (H)        Dose:  2 mg   Take 1 mL (2 mg) by mouth every 6 hours as needed for agitation or other (restlessness, nausea, vomiting)   Quantity:  60 mL   Refills:  0       morphine sulfate HIGH CONCENTRATE 10 mg/0.5 mL (HIGH CONC) solution   Commonly known as:  ROXANOL CONCENTRATED   Used for:  Acute respiratory failure with hypoxia (H)        Dose:  2 mg   Place 0.1 mLs (2 mg) under the tongue every 2 hours as needed for shortness of breath / dyspnea, moderate to severe pain or other (or dyspnea)   Quantity:  30 mL   Refills:  0       * Notice:  This list has 2 medication(s) that are the same as other medications prescribed for you. Read the directions carefully, and ask your doctor or other care provider to review them with you.      CONTINUE these medicines which may have CHANGED, or have new prescriptions. If we are  uncertain of the size of tablets/capsules you have at home, strength may be listed as something that might have changed.        Dose / Directions    methadone 10 MG/ML (HIGH CONC) solution   Commonly known as:  DOLOPHINE-INTENSOL   This may have changed:    - medication strength  - how much to take  - how to take this   Used for:  Acute respiratory failure with hypoxia (H)        Dose:  60 mg   Start taking on:  2/1/2018   6 mLs (60 mg) by Per Feeding Tube route daily   Quantity:  30 mL   Refills:  0         CONTINUE these medicines which have NOT CHANGED        Dose / Directions    albuterol 108 (90 BASE) MCG/ACT Inhaler   Commonly known as:  PROAIR HFA   Used for:  Wheezing        Dose:  2 puff   Inhale 2 puffs into the lungs every 6 hours   Quantity:  1 Inhaler   Refills:  5       beclomethasone 80 MCG/ACT Inhaler   Commonly known as:  QVAR   Used for:  Cough        Dose:  2 puff   Inhale 2 puffs into the lungs 2 times daily   Quantity:  1 Inhaler   Refills:  11         STOP taking     azithromycin 500 MG tablet   Commonly known as:  ZITHROMAX           cyclobenzaprine 10 MG tablet   Commonly known as:  FLEXERIL           DULoxetine 30 MG EC capsule   Commonly known as:  CYMBALTA           fluticasone 50 MCG/ACT spray   Commonly known as:  GNP FLUTICASONE PROPIONATE           gabapentin 600 MG tablet   Commonly known as:  NEURONTIN           oseltamivir 75 MG capsule   Commonly known as:  TAMIFLU           varenicline 0.5 MG X 11 & 1 MG X 42 tablet   Commonly known as:  CHANTIX STARTING MONTH AUSTIN           zolpidem 5 MG tablet   Commonly known as:  AMBIEN                Where to get your medicines      Some of these will need a paper prescription and others can be bought over the counter. Ask your nurse if you have questions.     Bring a paper prescription for each of these medications     haloperidol 2 MG/ML (HIGH CONC) solution    methadone 10 MG/ML (HIGH CONC) solution    morphine sulfate HIGH CONCENTRATE 10  mg/0.5 mL (HIGH CONC) solution       You don't need a prescription for these medications     acetaminophen 32 mg/mL solution    acetaminophen 650 MG Suppository    bisacodyl 10 MG Suppository                Protect others around you: Learn how to safely use, store and throw away your medicines at www.disposemymeds.org.        Information about OPIOIDS     PRESCRIPTION OPIOIDS: WHAT YOU NEED TO KNOW    Prescription opioids can be used to help relieve moderate to severe pain and are often prescribed following a surgery or injury, or for certain health conditions. These medications can be an important part of treatment but also come with serious risks. It is important to work with your health care provider to make sure you are getting the safest, most effective care.    WHAT ARE THE RISKS AND SIDE EFFECTS OF OPIOID USE?  Prescription opioids carry serious risks of addiction and overdose, especially with prolonged use. An opioid overdose, often marked by slowed breathing can cause sudden death. The use of prescription opioids can have a number of side effects as well, even when taken as directed:      Tolerance - meaning you might need to take more of a medication for the same pain relief    Physical dependence - meaning you have symptoms of withdrawal when a medication is stopped    Increased sensitivity to pain    Constipation    Nausea, vomiting, and dry mouth    Sleepiness and dizziness    Confusion    Depression    Low levels of testosterone that can result in lower sex drive, energy, and strength    Itching and sweating    RISKS ARE GREATER WITH:    History of drug misuse, substance use disorder, or overdose    Mental health conditions (such as depression or anxiety)    Sleep apnea    Older age (65 years or older)    Pregnancy    Avoid alcohol while taking prescription opioids.   Also, unless specifically advised by your health care provider, medications to avoid include:    Benzodiazepines (such as Xanax or  Valium)    Muscle relaxants (such as Soma or Flexeril)    Hypnotics (such as Ambien or Lunesta)    Other prescription opioids    KNOW YOUR OPTIONS:  Talk to your health care provider about ways to manage your pain that do not involve prescription opioids. Some of these options may actually work better and have fewer risks and side effects:    Pain relievers such as acetaminophen, ibuprofen, and naproxen    Some medications that are also used for depression or seizures    Physical therapy and exercise    Cognitive behavioral therapy, a psychological, goal-directed approach, in which patients learn how to modify physical, behavioral, and emotional triggers of pain and stress    IF YOU ARE PRESCRIBED OPIOIDS FOR PAIN:    Never take opioids in greater amounts or more often than prescribed    Follow up with your primary health care provider and work together to create a plan on how to manage your pain.    Talk about ways to help manage your pain that do not involve prescription opioids    Talk about all concerns and side effects    Help prevent misuse and abuse    Never sell or share prescription opioids    Never use another person's prescription opioids    Store prescription opioids in a secure place and out of reach of others (this may include visitors, children, friends, and family)    Visit www.cdc.gov/drugoverdose to learn about risks of opioid abuse and overdose    If you believe you may be struggling with addiction, tell your health care provider and ask for guidance or call [facility]'s National Helpline at 5-644-408-HELP    LEARN MORE / www.cdc.gov/drugoverdose/prescribing/guideline.html    Safely dispose of unused prescription opioids: Find your local drug take-back programs and more information about the importance of safe disposal at www.doseofreality.[place].gov             Medication List: This is a list of all your medications and when to take them. Check marks below indicate your daily home schedule. Keep this  list as a reference.      Medications           Morning Afternoon Evening Bedtime As Needed    * acetaminophen 32 mg/mL solution   Commonly known as:  TYLENOL   20.3 mLs (650 mg) by Per Feeding Tube route every 6 hours as needed for mild pain or fever   Last time this was given:  650 mg on 1/30/2018  8:49 AM                                * acetaminophen 650 MG Suppository   Commonly known as:  TYLENOL   Place 1 suppository (650 mg) rectally every 6 hours as needed for mild pain or fever (temperature over 100? F )                                albuterol 108 (90 BASE) MCG/ACT Inhaler   Commonly known as:  PROAIR HFA   Inhale 2 puffs into the lungs every 6 hours                                beclomethasone 80 MCG/ACT Inhaler   Commonly known as:  QVAR   Inhale 2 puffs into the lungs 2 times daily                                bisacodyl 10 MG Suppository   Commonly known as:  DULCOLAX   Place 1 suppository (10 mg) rectally daily as needed for other (for no bowel movement for 72 hours)                                haloperidol 2 MG/ML (HIGH CONC) solution   Commonly known as:  HALDOL   Take 1 mL (2 mg) by mouth every 6 hours as needed for agitation or other (restlessness, nausea, vomiting)                                methadone 10 MG/ML (HIGH CONC) solution   Commonly known as:  DOLOPHINE-INTENSOL   6 mLs (60 mg) by Per Feeding Tube route daily   Start taking on:  2/1/2018   Last time this was given:  60 mg on 1/31/2018  9:14 AM                                morphine sulfate HIGH CONCENTRATE 10 mg/0.5 mL (HIGH CONC) solution   Commonly known as:  ROXANOL CONCENTRATED   Place 0.1 mLs (2 mg) under the tongue every 2 hours as needed for shortness of breath / dyspnea, moderate to severe pain or other (or dyspnea)   Last time this was given:  5 mg on 1/31/2018  4:15 PM                                * Notice:  This list has 2 medication(s) that are the same as other medications prescribed for you. Read the directions  carefully, and ask your doctor or other care provider to review them with you.

## 2018-01-22 NOTE — IP AVS SNAPSHOT
` `     [place] [id number] [facility]: [phone]            Medication Administration Report for Joni Muñoz as of 01/31/18 1725   Legend:    Given Hold Not Given Due Canceled Entry Other Actions    Time Time (Time) Time  Time-Action       Inactive    Active    Linked        Medications 01/25/18 01/26/18 01/27/18 01/28/18 01/29/18 01/30/18 01/31/18    acetaminophen (TYLENOL) solution 650 mg  Dose: 650 mg  Freq: EVERY 6 HOURS PRN Route: PER FEEDING   PRN Reasons: mild pain,fever  Start: 01/27/18 1531   Admin Instructions: Maximum acetaminophen dose from all sources= 75 mg/kg/day not to exceed 4 grams/day.        0338 (650 mg)-Given       0907 (650 mg)-Given        0130 (650 mg)-Given       0822 (650 mg)-Given       1806 (650 mg)-Given        0849 (650 mg)-Given            acetaminophen (TYLENOL) Suppository 650 mg  Dose: 650 mg  Freq: EVERY 6 HOURS PRN Route: RE  PRN Reasons: mild pain,fever  PRN Comment: temperature over 100  F   Start: 01/30/18 1052   Admin Instructions: Maximum acetaminophen dose from all sources = 75 mg/kg/day not to exceed 4 grams/day.               albuterol neb solution 2.5 mg  Dose: 2.5 mg  Freq: EVERY 2 HOURS PRN Route: NEBULIZATION  PRN Reason: shortness of breath / dyspnea  Start: 01/22/18 1216              atropine 1 % ophthalmic solution 1-2 drop  Dose: 1-2 drop  Freq: EVERY 1 HOUR PRN Route: SL  PRN Reason: other  PRN Comment: secretions  Start: 01/30/18 1052              DULoxetine (CYMBALTA) EC capsule 30 mg  Dose: 30 mg  Freq: 2 TIMES DAILY Route: PER FEEDING   Start: 01/27/18 2100      2052 (30 mg)-Given        0907 (30 mg)-Given       2150 (30 mg)-Given        0822 (30 mg)-Given       2050 (30 mg)-Given        0848 (30 mg)-Given       2138 (30 mg)-Given        0911 (30 mg)-Given       [ ] 2100           fluticasone furoate (ARNUITY ELLIPTA) 100 MCG/ACT inhalation powder 1 puff  Dose: 1 puff  Freq: EVERY EVENING Route: IN  Start: 01/26/18 2000   Admin  Instructions: Formulary alternate for<br>qvar bid      (2033)-Not Given        (2052)-Not Given        (2043)-Not Given        (2043)-Not Given [C]        2138 (1 puff)-Given        [ ] 2000           glycopyrrolate (ROBINUL) injection 0.2-0.4 mg  Dose: 0.2-0.4 mg  Freq: EVERY 4 HOURS PRN Route: IV  PRN Reason: other  PRN Comment: secretions  Start: 01/30/18 1052   Admin Instructions: For ordered doses up to 0.2 mg, give IV Push undiluted over 1-2 minutes.          1613 (0.2 mg)-Given            haloperidol (HALDOL) 2 MG/ML (HIGH CONC) solution 1-2 mg  Dose: 1-2 mg  Freq: EVERY 6 HOURS PRN Route: PO  PRN Reasons: agitation,other  PRN Comment: restlessness, nausea, vomiting  Start: 01/30/18 1052              ipratropium - albuterol 0.5 mg/2.5 mg/3 mL (DUONEB) neb solution 3 mL  Dose: 3 mL  Freq: EVERY 4 HOURS PRN Route: NEBULIZATION  PRN Reasons: wheezing,shortness of breath / dyspnea  Start: 01/30/18 1100              methadone (DOLOPHINE-INTENSOL) 10 MG/ML (HIGH CONC) solution 60 mg  Dose: 60 mg  Freq: DAILY Route: PER FEEDING   Start: 01/28/18 0900   Admin Instructions: Caution: Solution is 10 times more concentrated that standard solution. Verify dose volume.    Order specific questions:  Indication Maintenance         0929 (60 mg)-Given        0954 (60 mg)-Given        0849 (60 mg)-Given        0914 (60 mg)-Given           morphine sulfate HIGH CONCENTRATE (ROXANOL CONCENTRATED) 10 mg/0.5 mL (HIGH CONC) solution 5-10 mg  Dose: 5-10 mg  Freq: EVERY 2 HOURS PRN Route: SL  PRN Reasons: moderate to severe pain,other  PRN Comment: or dyspnea  Start: 01/30/18 1052   Admin Instructions: Caution: solution is 10 times more concentrated than standard solution. Verify dose volume.           1615 (5 mg)-Given           OLANZapine zydis (zyPREXA) ODT half-tab 2.5-5 mg  Dose: 2.5-5 mg  Freq: EVERY 6 HOURS PRN Route: SL  PRN Reason: agitation  PRN Comment: delirium, nausea  Start: 01/30/18 1052   Admin Instructions: Use if  other ordered anti-nausea medications are ineffective.  With dry hands, peel back foil backing and gently remove tablet; do not push oral disintegrating tablet through foil backing; administer immediately on tongue and oral disintegrating tablet dissolves in seconds; then swallow with saliva; liquid not required.               senna-docusate (SENOKOT-S;PERICOLACE) 8.6-50 MG per tablet 1 tablet  Dose: 1 tablet  Freq: 2 TIMES DAILY PRN Route: PO  PRN Reason: constipation  Start: 01/22/18 1220   Admin Instructions: If no bowel movement in 24 hours, increase to 2 tablets PO.  Hold for loose stools.  This is the first step of a three step constipation treatment.              Or  senna-docusate (SENOKOT-S;PERICOLACE) 8.6-50 MG per tablet 2 tablet  Dose: 2 tablet  Freq: 2 TIMES DAILY PRN Route: PO  PRN Reason: constipation  Start: 01/22/18 1220   Admin Instructions: Hold for loose stools.  This is the first step of a three step constipation treatment.              Future Medications  Medications 01/25/18 01/26/18 01/27/18 01/28/18 01/29/18 01/30/18 01/31/18       bisacodyl (DULCOLAX) Suppository 10 mg  Dose: 10 mg  Freq: ONCE PRN Route: RE  PRN Reason: other  PRN Comment: for no bowel movement for 72 hours  Start: 02/02/18 0000   Admin Instructions: Give only after rectal check for impacted stool.              Completed Medications  Medications 01/25/18 01/26/18 01/27/18 01/28/18 01/29/18 01/30/18 01/31/18         Dose: 2 g  Freq: EVERY 24 HOURS Route: IV  Indications of Use: COMMUNITY ACQUIRED PNEUMONIA  Start: 01/22/18 1300   End: 01/29/18 1252   Admin Instructions: FIRST DOSE STAT  Give IVP over 3 minutes     1306 (2 g)-Given        1512 (2 g)-Given        1422 (2 g)-Given        1329 (2 g)-Given        1249 (2 g)-Given            Discontinued Medications  Medications 01/25/18 01/26/18 01/27/18 01/28/18 01/29/18 01/30/18 01/31/18         Rate: 50 mL/hr   Freq: CONTINUOUS Route: IV  Start: 01/23/18 1700   End: 01/30/18  1051   Admin Instructions: Start after NS bolus.     0542 ( )-New Bag       1742 ( )-New Bag        0800 ( )-Rate/Dose Verify       1700 ( )-New Bag        0241 ( )-New Bag       1852 ( )-Rate/Dose Change        0904 ( )-Rate/Dose Verify       0909 ( )-New Bag        0410 ( )-New Bag       0817 ( )-Rate/Dose Verify       1554 ( )-Rate/Dose Verify       2111 ( )-New Bag        1051-Med Discontinued          Dose: 12.5 mg  Freq: 2 TIMES DAILY WITH MEALS Route: ORAL OR FEED  Start: 01/28/18 1800   End: 01/30/18 1051   Admin Instructions: Hold for pulse <60 SBP <100        1707 (12.5 mg)-Given        0822 (12.5 mg)-Given       1806 (12.5 mg)-Given        0848 (12.5 mg)-Given       1051-Med Discontinued          Dose: 40 mg  Freq: EVERY 24 HOURS Route: SC  Start: 01/22/18 1600   End: 01/30/18 1051   Admin Instructions: HOLD if platelet count falls below 50% of baseline or less than 100,000/ L and notify provider.     1559 (40 mg)-Given        1900 (40 mg)-Given        1725 (40 mg)-Given        1707 (40 mg)-Given        1806 (40 mg)-Given        1051-Med Discontinued          Dose: 15-30 g  Freq: EVERY 15 MIN PRN Route: PO  PRN Reason: low blood sugar  Start: 01/22/18 1215   End: 01/30/18 1051   Admin Instructions: Give 15 g for BG 51 to 69 mg/dL IF patient is conscious and able to swallow. Give 30 g for BG less than or equal to 50 mg/dL IF patient is conscious and able to swallow. Do NOT give glucose gel via enteral tube.  IF patient has enteral tube: give apple juice 120 mL (4 oz or 15 g of CHO) via enteral tube for BG 51 to 69 mg/dL.  Give apple juice 240 mL (8 oz or 30 g of CHO) via enteral tube for BG less than or equal to 50 mg/dL.    ~Oral gel is preferable for conscious and able to swallow patient.   ~IF gel unavailable or patient refuses may provide apple juice 120 mL (4 oz or 15 g of CHO). Document juice on I and O flowsheet.          1051-Med Discontinued       Or    Dose: 25-50 mL  Freq: EVERY 15 MIN PRN  Route: IV  PRN Reason: low blood sugar  Start: 01/22/18 1215   End: 01/30/18 1051   Admin Instructions: Use if have IV access, BG less than 70 mg/dL and meet dose criteria below:  Dose if conscious and alert (or disorientated) and NPO = 25 mL  Dose if unconscious / not alert = 50 mL  Vesicant. For ordered doses up to 25 mg, give IV Push undiluted. Give each 5g over 1 minute.          1051-Med Discontinued       Or    Dose: 1 mg  Freq: EVERY 15 MIN PRN Route: SC  PRN Reason: low blood sugar  PRN Comment: May repeat x 1 only  Start: 01/22/18 1215   End: 01/30/18 1051   Admin Instructions: May give SQ or IM. ONLY use glucagon IF patient has NO IV access AND is UNABLE to swallow AND blood glucose is LESS than or EQUAL to 50 mg/dL.  Give IV Push over 1 minute. Reconstitute with 1mL sterile water.          1051-Med Discontinued          Dose: 10 mg  Freq: EVERY 4 HOURS PRN Route: IV  PRN Reason: high blood pressure  PRN Comment: systolic BP > 170 mm Hg  Start: 01/23/18 1900   End: 01/30/18 1051   Admin Instructions: For ordered doses up to 40 mg, give IV Push undiluted over 1 minute.     1553 (10 mg)-Given       2150 (10 mg)-Given        1353 (10 mg)-Given       1919 (10 mg)-Given        0829 (10 mg)-Given         0121 (10 mg)-Given        1051-Med Discontinued          Dose: 0.5 mL  Freq: PRIOR TO DISCHARGE Route: IM  Start: 01/27/18 1000   End: 01/30/18 1051   Admin Instructions: Administer when afebrile (less than 100.4 F) x 24 hours, or Prior to Discharge. If not administering when scheduled , change the due time by following the instructions in the reference link below. If patient refuses vaccine, chart as Vaccine Refused.                 1051-Med Discontinued          Dose: 1-4 Units  Freq: EVERY 4 HOURS Route: SC  Start: 01/22/18 1230   End: 01/30/18 1051   Admin Instructions: Correction Scale - LOW INSULIN RESISTANCE DOSING     Do Not give Correction Insulin if BG less than 140.  For  - 239 give 1 unit.  For   - 339 give 2 units.  For BG  340 - 439  give 3 units  For BG greater than or equal to 440 give 4 units  Check blood glucose Q4H and administer based on blood glucose.   Notify provider if glucose greater than or equal to 350 mg/dL after administration of correction dose.  If given at mealtime, must be administered 5 min before meal or immediately after.     (0004)-Not Given       (0542)-Not Given       (0757)-Not Given       (1208)-Not Given       (1559)-Not Given       (1949)-Not Given        (0041)-Not Given       (0522)-Not Given       (0724)-Not Given       (1327)-Not Given       (1815)-Not Given       (2118)-Not Given        (0038)-Not Given       (0519)-Not Given       (0841)-Not Given       (1419)-Not Given       (1758)-Not Given       (2053)-Not Given        (0105)-Not Given       0503 (1 Units)-Given       0908 (1 Units)-Given       (1218)-Not Given       (1707)-Not Given       (2044)-Not Given        (0111)-Not Given       0507 (1 Units)-Given       (0830)-Not Given       (1255)-Not Given       (1710)-Not Given       (2043)-Not Given        (0109)-Not Given [C]       (0429)-Not Given       (0815)-Not Given       1051-Med Discontinued          Dose: 3 mL  Freq: EVERY 4 HOURS Route: NEBULIZATION  Start: 01/22/18 1230   End: 01/30/18 1051    0000 (3 mL)-Given       0347 (3 mL)-Given       0829 (3 mL)-Given       1142 (3 mL)-Given       1621 (3 mL)-Given       1957 (3 mL)-Given       2314 (3 mL)-Given        0325 (3 mL)-Given       0715 (3 mL)-Given       1134 (3 mL)-Given       1608 (3 mL)-Given       2000 (3 mL)-Given       2316 (3 mL)-Given        0304 (3 mL)-Given       0746 (3 mL)-Given       (1136)-Not Given       1609 (3 mL)-Given       1905 (3 mL)-Given       2339 (3 mL)-Given        0330 (3 mL)-Given       0748 (3 mL)-Given       1107 (3 mL)-Given       1531 (3 mL)-Given       1811 (3 mL)-Given        0046 (3 mL)-Given       0331 (3 mL)-Given       0842 (3 mL)-Given       1137 (3 mL)-Given        1557 (3 mL)-Given       1910 (3 mL)-Given       2357 (3 mL)-Given        (0700)-Not Given       0812 (3 mL)-Given       1051-Med Discontinued          Dose: 40 mg  Freq: EVERY 4 HOURS PRN Route: IV  PRN Reason: high blood pressure  PRN Comment: sbp > 180 or dbp > 110  Start: 01/25/18 1618   End: 01/30/18 1051   Admin Instructions: For ordered doses up to 80 mg, give IV Push undiluted. Give each 20 mg over 2 minutes.     1943 (40 mg)-Given        0331 (40 mg)-Given           1051-Med Discontinued          Dose: 4 g  Freq: EVERY 4 HOURS PRN Route: IV  PRN Reason: magnesium supplementation  Start: 01/22/18 1220   End: 01/30/18 1051   Admin Instructions: For serum Mg++ less than 1.6 mg/dL  Give 4 g and recheck magnesium level 2 hours after dose, and next AM.          1051-Med Discontinued          Dose: 1-2 mg  Freq: EVERY 3 HOURS PRN Route: IV  PRN Reason: severe pain  Start: 01/27/18 0406   End: 01/30/18 1051   Admin Instructions: For ordered doses up to 15 mg give IV Push undiluted over 4-5 minutes.       0515 (1 mg)-Given       0829 (2 mg)-Given       1127 (2 mg)-Given          1051-Med Discontinued          Dose: 5-10 mg  Freq: EVERY 2 HOURS PRN Route: PO  PRN Reason: moderate to severe pain  PRN Comment: or dyspnea  Start: 01/30/18 1052   End: 01/30/18 1053         1053-Med Discontinued                 Dose: 0.1-0.4 mg  Freq: EVERY 2 MIN PRN Route: IV  PRN Reason: opioid reversal  Start: 01/22/18 1215   End: 01/30/18 1051   Admin Instructions: For respiratory rate LESS than or EQUAL to 8.  Partial reversal dose:  0.1 mg titrated q 2 minutes for Analgesia Side Effects Monitoring Sedation Level of 3 (frequently drowsy, arousable, drifts to sleep during conversation).Full reversal dose:  0.4 mg bolus for Analgesia Side Effects Monitoring Sedation Level of 4 (somnolent, minimal or no response to stimulation).  For ordered doses up to 2mg give IVP. Give each 0.4mg over 15 seconds in emergency situations. For  non-emergent situations further dilute in 9mL of NS to facilitate titration of response.          1051-Med Discontinued          Dose: 4 mg  Freq: EVERY 6 HOURS PRN Route: PO  PRN Reasons: nausea,vomiting  Start: 01/22/18 1220   End: 01/30/18 1051   Admin Instructions: This is Step 1 of nausea and vomiting management.  If nausea not resolved in 15 minutes, go to Step 2 prochlorperazine (COMPAZINE). Do not push through foil backing. Peel back foil and gently remove. Place on tongue immediately. Administration with liquid unnecessary  With dry hands, peel back foil backing and gently remove tablet; do not push oral disintegrating tablet through foil backing; administer immediately on tongue and oral disintegrating tablet dissolves in seconds; then swallow with saliva; liquid not required.          1051-Med Discontinued       Or    Dose: 4 mg  Freq: EVERY 6 HOURS PRN Route: IV  PRN Reasons: nausea,vomiting  Start: 01/22/18 1220   End: 01/30/18 1051   Admin Instructions: This is Step 1 of nausea and vomiting management.  If nausea not resolved in 15 minutes, go to Step 2 prochlorperazine (COMPAZINE).  Irritant. For ordered doses up to 4 mg, give IV Push undiluted over 2-5 minutes.          1051-Med Discontinued          Dose: 40 mg  Freq: DAILY Route: PER FEEDING   Start: 01/26/18 0900   End: 01/30/18 1051   Admin Instructions: Pharmacist Dose Change per: IV-PO Policy.              (0822)-Not Given        0907 (40 mg)-Given        0822 (40 mg)-Given        0849 (40 mg)-Given       1051-Med Discontinued          Dose: 20-40 mEq  Freq: EVERY 2 HOURS PRN Route: ORAL OR FEED  PRN Reason: potassium supplementation  Start: 01/22/18 1220   End: 01/30/18 1051   Admin Instructions: Use if unable to tolerate tablets.  If Serum K+ 3.0-3.3, dose = 60 mEq po total dose (40 mEq x1 followed in 2 hours by 20 mEq x1). Recheck K+ level 4 hours after dose and the next AM.  If Serum K+ 2.5-2.9, dose = 80 mEq po total dose (40 mEq Q2H x2).  Recheck K+ level 4 hours after dose and the next AM.  If Serum K+ less than 2.5, See IV order.  Dissolve packet contents in 4-8 ounces of cold water or juice.          1051-Med Discontinued          Dose: 10 mEq  Freq: EVERY 1 HOUR PRN Route: IV  PRN Reason: potassium supplementation  Start: 01/22/18 1220   End: 01/30/18 1051   Admin Instructions: Infuse via PERIPHERAL LINE. Use potassium with lidocaine for pain with peripheral administration.  If Serum K+ 3.0-3.3, dose = 10 mEq/hr x4 doses (40 mEq IV total dose). Recheck K+ level 2 hours after dose and the next AM.  If Serum K+ less than 3.0, dose = 10 mEq/hr x6 doses (60 mEq IV total dose). Recheck K+ level 2 hours after dose and the next AM.          1051-Med Discontinued          Dose: 10 mEq  Freq: EVERY 1 HOUR PRN Route: IV  PRN Reason: potassium supplementation  Start: 01/22/18 1220   End: 01/30/18 1051   Admin Instructions: Infuse via PERIPHERAL LINE or CENTRAL LINE. Use for central line replacement if patient weight less than 65 kg, if patient is on TPN with high potassium content or if unit does not stock 20 mEq bags.   If Serum K+ 3.0-3.3, dose = 10 mEq/hr x4 doses (40 mEq IV total dose). Recheck K+ level 2 hours after dose and the next AM.   If Serum K+ less than 3.0, dose = 10 mEq/hr x6 doses (60 mEq IV total dose). Recheck K+ level 2 hours after dose and the next AM.          1051-Med Discontinued          Dose: 20-40 mEq  Freq: EVERY 2 HOURS PRN Route: PO  PRN Reason: potassium supplementation  Start: 01/22/18 1220   End: 01/30/18 1051   Admin Instructions: Use if able to take PO.   If Serum K+ 3.0-3.3, dose = 60 mEq po total dose (40 mEq x1 followed in 2 hours by 20 mEq x1). Recheck K+ level 4 hours after dose and the next AM.  If Serum K+ 2.5-2.9, dose = 80 mEq po total dose (40 mEq Q2H x2). Recheck K+ level 4 hours after dose and the next AM.  If Serum K+ less than 2.5, See IV order.  DO NOT CRUSH          1051-Med Discontinued          Dose: 15  mmol  Freq: DAILY PRN Route: IV  PRN Reason: phosphorous supplementation  Start: 01/22/18 1220   End: 01/30/18 1051   Admin Instructions: For serum phosphorus level 2-2.4  Do not infuse Phosphorus in the same line as TPN.   Give 15 mmol and recheck phosphorus level next AM.          1051-Med Discontinued          Dose: 20 mmol  Freq: EVERY 6 HOURS PRN Route: IV  PRN Reason: phosphorous supplementation  Start: 01/22/18 1220   End: 01/30/18 1051   Admin Instructions: For serum phosphorus level 1.1-1.9  Do not infuse Phosphorus in the same line as TPN.   Give 20 mmol and recheck phosphorus level 2 hours after last dose and next AM.          1051-Med Discontinued          Dose: 25 mmol  Freq: EVERY 8 HOURS PRN Route: IV  PRN Reason: phosphorous supplementation  Start: 01/22/18 1220   End: 01/30/18 1051   Admin Instructions: For serum phosphorus level less than 1.1  Do not infuse Phosphorus in the same line as TPN.   Give 25 mmol and recheck phosphorus level 2 hours after last dose and next AM.          1051-Med Discontinued     Medications 01/25/18 01/26/18 01/27/18 01/28/18 01/29/18 01/30/18 01/31/18

## 2018-01-22 NOTE — H&P
Critical Care  Note      01/22/2018    Name: Joni Muñoz MRN#: [id number]   Age: 67 year old YOB: 1950     Hospitals in [place] Day# 0  ICU DAY #    MV DAY #             Problem List:   Acute encephalopathy   Severe sepsis  ?Community acquired pneumonia  Acute hypercapnic respiratory failure  Acute kidney injury          Summary/Hospital Course:     67 year old male with a history of substance abuse (methadone- rx and cocaine recreationally) who presents to the emergency department today via EMS for evaluation of altered mental status and hypoxia. The patient was seen in clinic on January 13th and was diagnosed with bronchitis, with followup scheduled in 4 weeks with reported exposure to flu and rx for tamiflu and zpak. Per EMS, Mr. Muñoz was checked on by family yesterday and found to be minimally responsive, so they assumed he was sleeping.  When checked on by family again this morning, he was still minimally responsive so they became concerned and contacted 911. En route, his sats were in the 70s, blood sugar at 96, and sounded congested with wheezing. He was unresponsive with minimal moans to pain. At arrival, he remained unresponsive and was put on 10 L/min of oxygen. He is a methadone clinic patient and has not gone in for the past couple of days.    In ED patient was intubated upon arrival secondary to obtundation and inability to protect airway. ABG showed a acute hypercarbia. LA 2.6 with elevated blood pressures. Labs otherwise noteable for ANTONI with Cr 1.9, shock liver with AST ALT >1000 , WBC 19K. Head ct without acute abnormality and CXR possible LL infiltrate. Patient was bolused 30 ml/kg IVfluid and dosed antibiotics over concern for sepsis and admitted to ICU for further cares.     On initial evaluation in the ICU, Mr. Muñoz is sedated and intubated. I spoke with the patient's niece, who stated the patient has overdosed on his methadone a few times in the past. He used up 3-4 methadone vials within  the past few days. She believes his methadone dose is somewhere between 90-120mg. Additionally, she states Mr. Muñoz drinks a significant amount of caffeine, up to 8 monster drinks a day as well uses cocaine.  She states that he has not been complaining of any chest pain, headache, or neck pain.       Assessment and plan :     Joni Muñoz is a 67 year old male with a history of substance abuse currently enrolled in a methadone clinic, tobacco use, and chronic back pain admitted on 1/22/2018 for acute encephalopathy and severe sepsis from presumed pneumonia  I have personally reviewed the daily labs, imaging studies, cultures and discussed the case with referring physician and consulting physicians.     My assessment and plan by system for this patient is as follows:    Neurology/Psychiatry:   1. Acute encephalopathy - toxic/metabolic vs infection vs EtOH vs other ingestion/overdose  2. Chronic methadone use - ?overdose  3. CT suggestive of prior CVAs (basal ganglion distribution)   Plan  - Sent Utox, serum tox, ammonia, serum Osms  - CT head negative for acute bleed --> ?MRI if mental status fails to improve  - Sepsis workup (CXR, pan culture)  - Minimize propofol sedation as much as possible  - Antibiotics for possible infectious etiology    Cardiovascular:   1. Hemodynamics appropriate (good UOP and lactate down-trending) with volume,   2. Sinus rhythm , QTC <500 (at risk for QTC prolongation with methadone)  3. Ischemia - EKG with ? Septal infarct (old) no acute changes  Plan  - ECG and troponin ordered   - Serial lactates to ensure down-trend  - trend troponins, consider ECHO for RWMA     Pulmonary/Ventilator Management:   1. Acute hypoxemic/hypercarbic respiratory failure - presumed 2/2 recent URI w/ encephalopathy; good oxygenation/ventilation mechanics on current vent settings,   2. ?URI/LRTI/CAP - no history of MDRO or respiratory failure in past - was recently prescribed zpak and tamiflu in outpt , ?  Taking   3. Tobacco use - ?obstructive disease (PFTs from 3/26/2015 FEV1/FVC 67) -   4. Acute sinusitis ? On CT head   Plan  - Continue mechanical ventilation current settings,   - empiric abx   - Sputum culture pending  - Legionella urine Ag pending  - Respiratory viral panel pending,   - Procalcitonin pending   - Serial ABGs   - Albuterol and Duonebs ordered PRN for probable underlying obstructive disease 2/2 smoking.   - consider 3 days afrin for sinusitis     GI and Nutrition :   1. Elevated LFTs - shock liver vs ingestion (EtOH), doubt inflammatory/AI disease  Plan  - Acetaminophen level negative  - Trend levels - consider RUQ ultrasound w/ doppler if worsening  - Ordered Lipase to check for pancreatitis     Renal/Fluids/Electrolytes:   1. Acute kidney injury - ?prerenal vs ATN vs toxin  2. Pseudohypocalcemia - albumin 3.3, corrected calcium 8.8 mg/dL  3. Anion Gap Metabolic acidosis (lactate elevation) though ? NAGMA given low AG - , delta delta ~1   4. ?Volume depletion - corrected w/ fluid boluses in ED   Plan  - Check serum Osms   - Trend lactate   - monitor function and electrolytes as needed with replacement per ICU protocols. - generally avoid nephrotoxic agents such as NSAID, IV contrast unless specifically required  - adjust medications as needed for renal clearance  - follow I/O's as appropriate.    Infectious Disease:   1. Severe sepsis - community acquired pneumonia most likely cause; exam and history not consistent with meningitis; bland urine   Plan  - 30mL/kg fluid bolus followed by maintenance fluids  - Stop Vancomycin and Zosyn (no hx of MDRO)   - Start Azithromycin and Ceftriaxone   - Follow cultures (sputum, blood, urine)  - check MRSA swab  - resp viral pcr     Endocrine:   1. ?Hypothyroidism   Plan  - ICU insulin protocol, goal sugar <180 using SSI  - Check TSH    Hematology/Oncology:   1. Leukocytosis - 2/2 infection  Plan  - Monitor w/ serial CBCs      IV/Access:   1. Venous access -  bilateral ACF access   2. Arterial access - no  Plan  - Will regularly assess need for central line; currently not indicated.     ICU Prophylaxis:   1. DVT: Hep Subq  2. VAP: HOB 30 degrees, chlorhexidine rinse  3. Stress Ulcer: PPI  4. Restraints: Nonviolent soft two point restraints required and necessary for patient safety and continued cares and good effect as patient continues to pull at necessary lines, tubes despite education and distraction. Will readdress daily.   5. Wound care  - will continually assess for wounds  6. Feeding - NPO  7. Family Update: Yes  8. Disposition - ICU     Key goals for next 24 hours:   1. Continue antibiotic therapy and mechanical ventilation for severe sepsis 2/2 community acquired pneumonia   2. Assess for resolution of lactate and ensure adequate perfusion of organs. Awaiting results of several labs.   3. Continual monitoring of neurologic status.          Medical History:     Past Medical History:   Diagnosis Date     Allergic rhinitis, cause unspecified      Back pain, chronic      Depressive disorder      Scoliosis (and kyphoscoliosis), idiopathic      Substance abuse      Tobacco use disorder      Past Surgical History:   Procedure Laterality Date     BACK SURGERY       HERNIA REPAIR  1969     ORTHOPEDIC SURGERY      L arm      Social History     Social History     Marital status: Single     Spouse name: N/A     Number of children: N/A     Years of education: N/A     Occupational History     Not on file.     Social History Main Topics     Smoking status: Current Every Day Smoker     Packs/day: 0.50     Years: 30.00     Types: Cigarettes     Smokeless tobacco: Current User     Alcohol use No     Drug use: No     Sexual activity: Yes     Partners: Female     Other Topics Concern     Parent/Sibling W/ Cabg, Mi Or Angioplasty Before 65f 55m? Yes      Service No     Blood Transfusions No     Caffeine Concern No     Occupational Exposure No     Hobby Hazards No     Sleep  Concern No     Stress Concern No     Weight Concern No     Special Diet No     Back Care Yes     Exercise Yes     Bike Helmet No     Seat Belt Yes     Self-Exams No     Social History Narrative        Allergies   Allergen Reactions     Acetaminophen Nausea              Key Medications:       vancomycin (VANCOCIN) IV  1,500 mg Intravenous Once       NaCl       propofol (DIPRIVAN) infusion 10 mcg/kg/min (01/22/18 0917)     NaCl 150 mL/hr at 01/22/18 1018        Home Meds    No current facility-administered medications on file prior to encounter.   Current Outpatient Prescriptions on File Prior to Encounter:  azithromycin (ZITHROMAX) 500 MG tablet Take 1 tablet (500 mg) by mouth daily   fluticasone (GNP FLUTICASONE PROPIONATE) 50 MCG/ACT spray Spray 1 spray into both nostrils daily   oseltamivir (TAMIFLU) 75 MG capsule Take 1 capsule (75 mg) by mouth 2 times daily   gabapentin (NEURONTIN) 600 MG tablet TAKE 2 TABLETS BY MOUTH THREE TIMES DAILY   cyclobenzaprine (FLEXERIL) 10 MG tablet Take 1 tablet (10 mg) by mouth 3 times daily as needed for muscle spasms   gabapentin (NEURONTIN) 600 MG tablet TAKE 2 TABLETS BY MOUTH THREE TIMES DAILY   cyclobenzaprine (FLEXERIL) 10 MG tablet Take 1 tablet (10 mg) by mouth 3 times daily as needed for muscle spasms   albuterol (PROAIR HFA) 108 (90 BASE) MCG/ACT Inhaler Inhale 2 puffs into the lungs every 6 hours   azithromycin (ZITHROMAX) 500 MG tablet Take 1 tablet (500 mg) by mouth daily   varenicline (CHANTIX STARTING MONTH AUSTIN) 0.5 MG X 11 & 1 MG X 42 tablet Take 0.5 mg tab daily for 3 days, then 0.5 mg tab twice daily for 4 days, then 1 mg twice daily.   doxepin (SINEQUAN) 25 MG capsule Take 1 capsule (25 mg) by mouth At Bedtime   zolpidem (AMBIEN) 5 MG tablet Take 1 tablet (5 mg) by mouth nightly as needed for sleep   beclomethasone (QVAR) 80 MCG/ACT Inhaler Inhale 2 puffs into the lungs 2 times daily   DULoxetine (CYMBALTA) 30 MG EC capsule Take 1 capsule (30 mg) by mouth 2  times daily              Physical Examination:   Temp:  [99.1  F (37.3  C)-101.5  F (38.6  C)] 100.9  F (38.3  C)  Heart Rate:  [] 77  Resp:  [12-41] 14  BP: ()/() 102/70  FiO2 (%):  [40 %-100 %] 40 %  SpO2:  [89 %-100 %] 95 %  No intake or output data in the 24 hours ending 01/22/18 1030  Wt Readings from Last 4 Encounters:   01/22/18 74.6 kg (164 lb 7.4 oz)   01/13/18 74.6 kg (164 lb 8 oz)   09/28/17 73.5 kg (162 lb)   09/21/17 73.9 kg (163 lb)     BP - Mean:  [] 92  Ventilation Mode: CMV/AC  FiO2 (%): 40 %  Rate Set (breaths/minute): 14 breaths/min  Tidal Volume Set (mL): 550 mL  PEEP (cm H2O): 5 cmH2O  Oxygen Concentration (%): 40 %  Resp: 14  No lab results found in last 7 days.    GEN: Adult male lying supine on bed, face is slightly red, intubated, sedated w/ propofol, appears comfortable  HEENT: bald male, head ncat, ETT @ 25 at teeth, mild quivering in the neck at the level of the pharynx that resolved after inflating the ETT cuff more, trachea midline   PULM: Right anterior breath sounds diminished compared to left; this improved with inflation of the ETT cuff but still slightly asymmetric. unlabored synchronous with vent, no crackles.   CV/COR: RRR S1S2 no gallop,  No rub, no murmur  ABD: soft nontender, hypoactive bowel sounds, no mass  EXT:  No edema, cool feet but 2+ DP bilaterally  NEURO: limited by sedation withdrawals to pain in al 4 extremities, symmetric relfex in upper and lower extremities   SKIN: no obvious rash  LINES: clean, dry intact         Data:   All data and imaging reviewed     ROUTINE ICU LABS (Last four results)  CMP  Recent Labs  Lab 01/22/18  0848      POTASSIUM 4.4   CHLORIDE 104   CO2 26   ANIONGAP 10   GLC 92   BUN 34*   CR 1.94*   GFRESTIMATED 35*   GFRESTBLACK 42*   HEATH 8.2*   PROTTOTAL 8.0   ALBUMIN 3.3*   BILITOTAL 0.8   ALKPHOS 153*   AST 1225*   *     CBC  Recent Labs  Lab 01/22/18  0848   WBC 19.0*   RBC 5.38   HGB 17.1   HCT 49.3    MCV 92   MCH 31.8   MCHC 34.7   RDW 15.4*        INRNo lab results found in last 7 days.  Arterial Blood GasNo lab results found in last 7 days.    All cultures:  No results for input(s): CULT in the last 168 hours.  Recent Results (from the past 24 hour(s))   XR Chest Port 1 View    Narrative    XR CHEST PORT 1 VW 1/22/2018 8:57 AM     HISTORY: decreasd loc;       Impression    IMPRESSION: Linear atelectasis or fibrosis at the left lung base.  Additional mild hazy opacity of the left lung base posterior to the  heart could represent pneumonia or aspiration.    LULÚ KAISER MD   XR Chest Port 1 View    Narrative    XR CHEST PORT 1 VW 1/22/2018 9:18 AM    HISTORY: Decreased level of consciousness.    COMPARISON: January 22, 2018.      Impression    IMPRESSION: An endotracheal tube terminates 8.7 cm above the joyce.  There is patchy opacification of the left lung base, worsened compared  to prior exam. No pneumothorax.    BRANNON MONTIEL MD   Head CT w/o contrast    Narrative    CT SCAN OF THE HEAD WITHOUT CONTRAST   1/22/2018 9:35 AM     HISTORY: Altered mental status.    TECHNIQUE:  Axial images of the head and coronal reformations without  IV contrast material.  Radiation dose for this scan was reduced using  automated exposure control, adjustment of the mA and/or kV according  to patient size, or iterative reconstruction technique.    COMPARISON: None.    FINDINGS: There is an old infarct in the right basal ganglia region  and a small cystic area in the genu of the corpus callosum which is  also probably an old infarct. There is also a tiny old right anterior  thalamic lacunar infarct. Mild cerebral atrophy and some minimal  patchy white matter changes are present in both hemispheres. There is  no evidence for intracranial hemorrhage, mass effect, acute infarct,  or skull fracture. Extensive paranasal sinus disease is seen with  air-fluid levels. Some vascular calcifications also noted.      Impression     IMPRESSION:  1. Chronic intraparenchymal brain changes. No evidence for any acute  intracranial process.  2. Extensive paranasal sinus disease with fluid levels. An acute  sinusitis could be present. Clinical correlation suggested.   Chest  XR, 1 view PORTABLE    Narrative    XR CHEST PORT 1 VW 1/22/2018 10:01 AM    HISTORY: Endotracheal tube placement.    COMPARISON: January 22, 2018.      Impression    IMPRESSION: The endotracheal tube terminates 6.5 cm from the joyce.  Nasogastric tube extends into the stomach beyond the lower margin the  study. Patchy opacification of left lung base as before. No  pneumothorax.    BRANNON MONTIEL MD     ECG (12:34)  Rate: 76bpm  Rhythm: Normal sinus   AK: 128 ms  QRS 78 ms  QTc 463 ms  Notes: Left axis deviation. Old septal infarct with Q-waves in leads V1, V2, and aVL.       Billing: This patient is critically ill: Yes. Total critical care time today 70 min.        I, Dereck Hartman, MS4, acted as scribe for Dr Justice

## 2018-01-22 NOTE — IP AVS SNAPSHOT
` `     [place] [address] [facility]: [phone]                 INTERAGENCY TRANSFER FORM - NOTES (H&P, Discharge Summary, Consults, Procedures, Therapies)   2018                    Hospital Admission Date: 2018  JONI MUÑOZ   : 1950  Sex: Male        Patient PCP Information     Provider PCP Type    Kodi Gonzalez MD General         History & Physicals      H&P by Jadiel Justice MD at 2018  8:45 AM     Author:  Jadiel Justice MD Service:  ICU Author Type:  Physician    Filed:  2018  3:18 PM Date of Service:  2018  8:45 AM Creation Time:  2018 10:30 AM    Status:  Signed :  Jadiel Justice MD (Physician)         Critical Care  Note      2018    Name: Joni Muñoz MRN#: [id number]   Age: 67 year old YOB: 1950     Hsptl Day# 0  ICU DAY #    MV DAY #             Problem List:[RR1.1]   Acute encephalopathy[JK1.1]   Severe sepsis  ?Community acquired pneumonia  Acute hypercapnic respiratory failure  Acute kidney injury[JK1.2]          Summary/Hospital Course:     67 year old male with a history of substance abuse[RR1.1] (methadone- rx and cocaine recreationally)[RR1.2] who presents to the emergency department today via EMS for evaluation of altered mental status and hypoxia. The patient was seen in clinic on  and was diagnosed with bronchitis[RR1.1], with follow[JK1.2]up[RR1.2] scheduled in 4[JK1.2] weeks[RR1.1] with reported exposure to flu and rx for tamiflu and zpak[RR1.2].[RR1.1] Per EMS, Mr. Muñoz was checked on by family yesterday and found to be minimally responsive, so they assumed he was sleeping.  When checked on by family again this morning, he was still minimally responsive so they became concerned and contacted 911.[JK1.2] En route, his sats were in the 70s, blood sugar at 96, and sounded congested with wheezing. He was unresponsive with minimal moans to pain. At  arrival, he remained unresponsive and was put on 10 L/min of oxygen.[RR1.1] He is a[JK1.2] methadone clinic patient and has not gone in for the past couple of days[RR1.1].[JK1.1]    In ED patient was intubated upon arrival secondary to obtundation and inability to protect airway. ABG showed a acute hypercarbia. LA 2.6 with elevated blood pressures. Labs otherwise noteable for ANTONI with Cr 1.9, shock liver with AST ALT >1000 , WBC 19K. Head ct without acute abnormality and CXR possible LL infiltrate. Patient was bolused 30 ml/kg IVfluid and dosed antibiotics over concern for sepsis and admitted to ICU for further cares.[RR1.2]     On initial evaluation in the ICU, Mr. Muñoz is sedated and intubated.[JK1.1] I spoke with the patient's niece, who stated the patient has overdosed on his methadone a few times in the past. He used up[JK1.3] 3-4 methadone vials[JK1.4] within the past few days. She believes his methadone dose is somewhere between 90-120mg. Additionally, she states Mr. Muñoz drinks a significant amount of caffeine, up to[JK1.3] 8 monster drinks a day[JK1.4] as well uses cocaine.[RR1.2]  She states that he has not been complaining of any chest pain, headache, or neck pain.[JK1.5]       Assessment and plan :     Joni Muñoz[RR1.1] is[JK1.1] a 67 year old male[RR1.1] with a history of substance abuse currently enrolled in a methadone clinic, tobacco use, and chronic back pain[JK1.1] admitted on 1/22/2018 for[RR1.1] acute encephalopathy and severe sepsis[JK1.1] from presumed pneumonia[RR1.2]  I have personally reviewed the daily labs, imaging studies, cultures and discussed the case with referring physician and consulting physicians.     My assessment and plan by system for this patient is as follows:    Neurology/Psychiatry:   1.[RR1.1] Acute encephalopathy - toxic/metabolic vs infection vs EtOH vs other ingestion/overdose[JK1.1]  2.[RR1.1] Chronic methadone use - ?overdose[JK1.1]  3. CT suggestive of prior  CVAs (basal ganglion distribution)[RR1.2]   Plan[RR1.1]  - Sent Utox, serum tox, ammonia, serum Osms  -[JK1.1] CT head negative for acute bleed -->[JK1.6] ?MRI if mental status fails to improve  - Sepsis workup (CXR, pan culture)  - Minimize[JK1.1] propofol[JK1.7] sedation as much as possible  - Antibiotics for possible infectious etiology[JK1.1]    Cardiovascular:   1. Hemodynamics[RR1.1] appropriate (good UOP and lactate down-trending)[JK1.1] with volume,[RR1.2]   2.[RR1.1] Sinus rhythm[JK1.1] , QTC <500 (at risk for QTC prolongation with methadone)  3. Ischemia - EKG with ? Septal infarct (old) no acute changes[RR1.2]  Plan[RR1.1]  - ECG and troponin ordered   - Serial lactates to ensure down-trend[JK1.1]  - trend troponins, consider ECHO for RWMA[RR1.2]     Pulmonary/Ventilator Management:   1.[RR1.1] Acute hypoxemic/hypercarbic respiratory failure[JK1.1] - presumed 2/2 recent URI w/ encephalopathy; good oxygenation/ventilation mechanics[JK1.5] on current vent settings,[RR1.2]   2.[RR1.1] ?URI/LRTI/CAP - no history of MDRO or respiratory failure[JK1.1] in past - was recently prescribed zpak and tamiflu in outpt , ? Taking[RR1.2]   3. Tobacco use - ?obstructive disease (PFTs from 3/26/2015 FEV1/FVC 67)[JK1.1] -   4. Acute sinusitis ? On CT head[RR1.2]   Plan  -[RR1.1] Continue mechanical ventilation[JK1.5] current settings,   - empiric abx[RR1.2]   - Sputum culture pending  - Legionella urine Ag pending  - Respiratory viral panel pending[JK1.5],[RR1.2]   - Procalcitonin pending   - Serial ABGs[JK1.5]   - Albuterol and Duonebs ordered PRN for probable underlying obstructive disease 2/2 smoking.[JK1.6]   - consider 3 days afrin for sinusitis[RR1.2]     GI and Nutrition :   1.[RR1.1] Elevated LFTs - shock liver vs ingestion (EtOH)[JK1.5], doubt inflammatory/AI disease[RR1.2]  Plan  -[RR1.1] Acetaminophen level negative  - Trend levels - consider RUQ ultrasound w/ doppler if worsening  - Ordered Lipase to check for  pancreatitis[JK1.5]     Renal/Fluids/Electrolytes:   1.[RR1.1] Acute kidney injury - ?prerenal vs ATN vs toxin[JK1.5]  2.[RR1.1] Pseudohypocalcemia - albumin 3.3, corrected calcium 8.8 mg/dL[JK1.5]  3.[RR1.1] Anion Gap Metabolic acidosis[JK1.5] (lactate elevation) though ? NAGMA given low AG - , delta delta ~1[RR1.2]   4.[RR1.1] ?Volume depletion - corrected w/ fluid boluses in ED[JK1.5]   Plan  -[RR1.1] Check serum Osms   - Trend lactate[JK1.5]   - monitor function and electrolytes as needed with replacement per ICU protocols. - generally avoid nephrotoxic agents such as NSAID, IV contrast unless specifically required  - adjust medications as needed for renal clearance  - follow I/O's as appropriate.    Infectious Disease:   1.[RR1.1] Severe sepsis - community acquired pneumonia most likely cause; exam and history not consistent with meningitis; bland urine[JK1.5]   Plan  -[RR1.1] 30mL/kg fluid bolus followed by maintenance fluids  - Stop Vancomycin and Zosyn[JK1.5] (no hx of MDRO)[RR1.2]   - Start Azithromycin and Ceftriaxone[JK1.5]   - Follow cultures (sputum, blood, urine)[JK1.7]  - check MRSA swab  - resp viral pcr[RR1.2]     Endocrine:   1.[RR1.1] ?Hypothyroidism[JK1.5]   Plan  - ICU insulin protocol, goal sugar <180[RR1.1] using SSI  - Check TSH[JK1.5]    Hematology/Oncology:   1.[RR1.1] Leukocytosis - 2/2 infection[JK1.5]  Plan  -[RR1.1] Monitor w/ serial CBCs[JK1.5]      IV/Access:   1. Venous access -[RR1.1] bilateral ACF access[JK1.5]   2. Arterial access -[RR1.1] no[JK1.5]  Plan  -[RR1.1] Will regularly assess need for central line; currently not indicated.[JK1.5]     ICU Prophylaxis:   1. DVT: Hep Subq  2. VAP: HOB 30 degrees, chlorhexidine rinse  3. Stress Ulcer: PPI  4. Restraints: Nonviolent soft two point restraints required and necessary for patient safety and continued cares and good effect as patient continues to pull at necessary lines, tubes despite education and distraction. Will readdress  daily.   5. Wound care  -[RR1.1] will continually assess for wounds[JK1.5]  6. Feeding -[RR1.1] NPO[JK1.5]  7. Family Update:[RR1.1] Yes[JK1.5]  8. Disposition -[RR1.1] ICU[JK1.5]     Key goals for next 24 hours:   1.[RR1.1] Continue antibiotic therapy[JK1.2] and mechanical ventilation[JK1.5] for severe sepsis 2/2 community acquired pneumonia[JK1.2]   2.[RR1.1] Assess for resolution of lactate and ensure adequate perfusion of organs. Awaiting results of several labs.[JK1.5]   3.[RR1.1] Continual monitoring of neurologic status.[JK1.5]          Medical History:     Past Medical History:   Diagnosis Date     Allergic rhinitis, cause unspecified      Back pain, chronic      Depressive disorder      Scoliosis (and kyphoscoliosis), idiopathic      Substance abuse      Tobacco use disorder      Past Surgical History:   Procedure Laterality Date     BACK SURGERY       HERNIA REPAIR  1969     ORTHOPEDIC SURGERY      L arm      Social History     Social History     Marital status: Single     Spouse name: N/A     Number of children: N/A     Years of education: N/A     Occupational History     Not on file.     Social History Main Topics     Smoking status: Current Every Day Smoker     Packs/day: 0.50     Years: 30.00     Types: Cigarettes     Smokeless tobacco: Current User     Alcohol use No     Drug use: No     Sexual activity: Yes     Partners: Female     Other Topics Concern     Parent/Sibling W/ Cabg, Mi Or Angioplasty Before 65f 55m? Yes      Service No     Blood Transfusions No     Caffeine Concern No     Occupational Exposure No     Hobby Hazards No     Sleep Concern No     Stress Concern No     Weight Concern No     Special Diet No     Back Care Yes     Exercise Yes     Bike Helmet No     Seat Belt Yes     Self-Exams No     Social History Narrative        Allergies   Allergen Reactions     Acetaminophen Nausea              Key Medications:       vancomycin (VANCOCIN) IV  1,500 mg Intravenous Once       NaCl        propofol (DIPRIVAN) infusion 10 mcg/kg/min (01/22/18 0917)     NaCl 150 mL/hr at 01/22/18 1018        Home Meds    No current facility-administered medications on file prior to encounter.   Current Outpatient Prescriptions on File Prior to Encounter:  azithromycin (ZITHROMAX) 500 MG tablet Take 1 tablet (500 mg) by mouth daily   fluticasone (GNP FLUTICASONE PROPIONATE) 50 MCG/ACT spray Spray 1 spray into both nostrils daily   oseltamivir (TAMIFLU) 75 MG capsule Take 1 capsule (75 mg) by mouth 2 times daily   gabapentin (NEURONTIN) 600 MG tablet TAKE 2 TABLETS BY MOUTH THREE TIMES DAILY   cyclobenzaprine (FLEXERIL) 10 MG tablet Take 1 tablet (10 mg) by mouth 3 times daily as needed for muscle spasms   gabapentin (NEURONTIN) 600 MG tablet TAKE 2 TABLETS BY MOUTH THREE TIMES DAILY   cyclobenzaprine (FLEXERIL) 10 MG tablet Take 1 tablet (10 mg) by mouth 3 times daily as needed for muscle spasms   albuterol (PROAIR HFA) 108 (90 BASE) MCG/ACT Inhaler Inhale 2 puffs into the lungs every 6 hours   azithromycin (ZITHROMAX) 500 MG tablet Take 1 tablet (500 mg) by mouth daily   varenicline (CHANTIX STARTING MONTH PAK) 0.5 MG X 11 & 1 MG X 42 tablet Take 0.5 mg tab daily for 3 days, then 0.5 mg tab twice daily for 4 days, then 1 mg twice daily.   doxepin (SINEQUAN) 25 MG capsule Take 1 capsule (25 mg) by mouth At Bedtime   zolpidem (AMBIEN) 5 MG tablet Take 1 tablet (5 mg) by mouth nightly as needed for sleep   beclomethasone (QVAR) 80 MCG/ACT Inhaler Inhale 2 puffs into the lungs 2 times daily   DULoxetine (CYMBALTA) 30 MG EC capsule Take 1 capsule (30 mg) by mouth 2 times daily              Physical Examination:[RR1.1]   Temp:  [99.1  F (37.3  C)-101.5  F (38.6  C)] 100.9  F (38.3  C)  Heart Rate:  [] 77  Resp:  [12-41] 14  BP: ()/() 102/70  FiO2 (%):  [40 %-100 %] 40 %  SpO2:  [89 %-100 %] 95 %[RR1.3]  No intake or output data in the 24 hours ending 01/22/18 1030  Wt Readings from Last 4 Encounters:    01/22/18 74.6 kg (164 lb 7.4 oz)   01/13/18 74.6 kg (164 lb 8 oz)   09/28/17 73.5 kg (162 lb)   09/21/17 73.9 kg (163 lb)     BP - Mean:  [] 92[RR1.1]  Ventilation Mode: CMV/AC  FiO2 (%): 40 %  Rate Set (breaths/minute): 14 breaths/min  Tidal Volume Set (mL): 550 mL  PEEP (cm H2O): 5 cmH2O  Oxygen Concentration (%): 40 %  Resp: 14[RR1.3]  No lab results found in last 7 days.    GEN:[RR1.1] Adult male lying supine on bed, face is slightly red, intubated, sedated w/ propofol, appears comfortable[JK1.8]  HEENT:[RR1.1] bald male,[JK1.8] head ncat,[RR1.1] ETT @ 25 at teeth, mild quivering in the neck at the level of the pharynx that resolved after inflating the ETT cuff more,[JK1.8] trachea midline   PULM:[RR1.1] Right anterior breath sounds diminished compared to left; this improved with inflation of the ETT cuff but still slightly asymmetric.[JK1.8] unlabored synchronous with vent[RR1.1], no crackles.[JK1.8]   CV/COR: RRR S1S2 no gallop,  No rub, no murmur  ABD: soft nontender, hypoactive bowel sounds, no mass  EXT:[RR1.1]  No e[RR1.2]reina[RR1.1], cool feet but 2+ DP bilaterally[RR1.2]  NEURO:[RR1.1] limited by sedation withdrawals to pain in al 4 extremities, symmetric relfex in upper and lower extremities[RR1.2]   SKIN: no obvious rash  LINES: clean, dry intact         Data:   All data and imaging reviewed     ROUTINE ICU LABS (Last four results)  CMP  Recent Labs  Lab 01/22/18  0848      POTASSIUM 4.4   CHLORIDE 104   CO2 26   ANIONGAP 10   GLC 92   BUN 34*   CR 1.94*   GFRESTIMATED 35*   GFRESTBLACK 42*   HEATH 8.2*   PROTTOTAL 8.0   ALBUMIN 3.3*   BILITOTAL 0.8   ALKPHOS 153*   AST 1225*   *     CBC  Recent Labs  Lab 01/22/18  0848   WBC 19.0*   RBC 5.38   HGB 17.1   HCT 49.3   MCV 92   MCH 31.8   MCHC 34.7   RDW 15.4*        INRNo lab results found in last 7 days.  Arterial Blood GasNo lab results found in last 7 days.    All cultures:  No results for input(s): CULT in the last 168  hours.  Recent Results (from the past 24 hour(s))   XR Chest Port 1 View    Narrative    XR CHEST PORT 1 VW 1/22/2018 8:57 AM     HISTORY: decreasd loc;       Impression    IMPRESSION: Linear atelectasis or fibrosis at the left lung base.  Additional mild hazy opacity of the left lung base posterior to the  heart could represent pneumonia or aspiration.    LULÚ KAISER MD   XR Chest Port 1 View    Narrative    XR CHEST PORT 1 VW 1/22/2018 9:18 AM    HISTORY: Decreased level of consciousness.    COMPARISON: January 22, 2018.      Impression    IMPRESSION: An endotracheal tube terminates 8.7 cm above the joyce.  There is patchy opacification of the left lung base, worsened compared  to prior exam. No pneumothorax.    BRANNON MONTIEL MD   Head CT w/o contrast    Narrative    CT SCAN OF THE HEAD WITHOUT CONTRAST   1/22/2018 9:35 AM     HISTORY: Altered mental status.    TECHNIQUE:  Axial images of the head and coronal reformations without  IV contrast material.  Radiation dose for this scan was reduced using  automated exposure control, adjustment of the mA and/or kV according  to patient size, or iterative reconstruction technique.    COMPARISON: None.    FINDINGS: There is an old infarct in the right basal ganglia region  and a small cystic area in the genu of the corpus callosum which is  also probably an old infarct. There is also a tiny old right anterior  thalamic lacunar infarct. Mild cerebral atrophy and some minimal  patchy white matter changes are present in both hemispheres. There is  no evidence for intracranial hemorrhage, mass effect, acute infarct,  or skull fracture. Extensive paranasal sinus disease is seen with  air-fluid levels. Some vascular calcifications also noted.      Impression    IMPRESSION:  1. Chronic intraparenchymal brain changes. No evidence for any acute  intracranial process.  2. Extensive paranasal sinus disease with fluid levels. An acute  sinusitis could be present. Clinical  correlation suggested.   Chest  XR, 1 view PORTABLE    Narrative    XR CHEST PORT 1 VW 1/22/2018 10:01 AM    HISTORY: Endotracheal tube placement.    COMPARISON: January 22, 2018.      Impression    IMPRESSION: The endotracheal tube terminates 6.5 cm from the joyce.  Nasogastric tube extends into the stomach beyond the lower margin the  study. Patchy opacification of left lung base as before. No  pneumothorax.    BRANNON MONTIEL MD[RR1.1]     ECG (12:34)  Rate: 76bpm  Rhythm: Normal sinus   MT: 128 ms  QRS 78 ms  QTc 463 ms  Notes: Left axis deviation. Old[JK1.5] septal[JK1.9] infarct with Q-waves in leads V1, V2, and aVL.[JK1.5]       Billing: This patient is critically ill:[RR1.1] Yes[JK1.2]. Total critical care time today[RR1.1] 70[RR1.2] min.[RR1.1]        I, Dereck Hartman, MS4, acted as scribe for Dr Justice[RR1.2]                            Revision History        User Key Date/Time User Provider Type Action    > RR1.3 1/22/2018  3:18 PM Jadiel Justice MD Physician Sign     RR1.2 1/22/2018  2:59 PM Jadiel Justice MD Physician      JK1.7 1/22/2018  2:10 PM Dereck Hartman Medical Student Share     JK1.6 1/22/2018  2:02 PM Dereck Hartman Medical Student Share     [N/A] 1/22/2018  1:59 PM Dereck Hartman Medical Student Share     JK1.9 1/22/2018  1:59 PM Dereck Hartman Medical Student Share     JK1.5 1/22/2018  1:58 PM Dereck Hartman Medical Student Share     JK1.3 1/22/2018  1:35 PM Dereck Hartman Medical Student Share     [N/A] 1/22/2018  1:07 PM Dereck Hartman Medical Student Share     [N/A] 1/22/2018  1:06 PM Dereck Hartman Medical Student Share     JK1.4 1/22/2018  1:02 PM Dereck Hartman Medical Student Share     JK1.1 1/22/2018 12:50 PM Dereck Hartman Medical Student      JK1.8 1/22/2018 12:08 PM Dereck Hartman Medical Student Share     JK1.2 1/22/2018 12:03 PM Dereck Hartman Medical Student Share     RR1.1 1/22/2018 10:33 AM Jadiel Justice MD Physician Share                      Discharge Summaries      Discharge Summaries by Nikhil Araya DO at 1/31/2018  2:33 PM     Author:  Nikhil Araya DO Service:  Hospitalist Author Type:  Physician    Filed:  1/31/2018  3:41 PM Date of Service:  1/31/2018  2:33 PM Creation Time:  1/31/2018  2:33 PM    Status:  Signed :  Nikhil Araya DO (Physician)         [facility]    Discharge Summary  Hospitalist    Date of Admission:  1/22/2018  Date of Discharge:[JH1.1]  1/31/2018[JH1.2]  Discharging Provider: Nikhil Araya DO    Discharge Diagnoses[JH1.1]   1. Acute hypoxic & hypercarbic respiratory failure  2. Severe sepsis 2/2 CAP  3. Suspected Methadone overdose   4. Probably COPD   5. Acute encephalopathy 2/2 anoxic brain injury  6. Dysphasia  7. Hypernatremia 2/2 dehydration   8. H/o polysubstance abuse  9. Elevated troponin, suspect demand ischemia  10. Newly diagnosed Cardiomyopathy with reduced EF  11. HTN   12. ANTONI   13. Hyperglycemia  14. Shock liver  15. End of life planning with election to go to inpatient hospice[JH1.3]     History of Present Illness   Joni Muñoz is an 67 year old male[JH1.1] with a history of substance abuse (methadone- rx and cocaine recreationally) who presented to the emergency department via EMS for evaluation of altered mental status and hypoxia. The patient was seen in clinic on January 13th and was diagnosed with bronchitis, with followup scheduled in 4 weeks with reported exposure to flu and rx for tamiflu and zpak. Per EMS, the patient was checked on by family the day prior and found to be minimally responsive, so they assumed he was sleeping.  When checked on by family again this morning, he was still minimally responsive so they became concerned and contacted 911. En route, his sats were in the 70s, blood sugar at 96, and sounded congested with wheezing. He was unresponsive with minimal moans to pain. At arrival, he remained unresponsive and was put on  10 L/min of oxygen.[JH1.3]    Hospital Course   Joni Muñoz was admitted on 1/22/2018.  The following problems were addressed during his hospitalization:[JH1.1]    Patient was initially admitted for hypoxic and hypercarbic respiratory failure thought to be related to a methadone overdose as well as severe sepsis secondary to CAP.  He was managed as below but it was clear the patient was not improving and imaging was suggestive of anoxic brain injury.  After discussion with the patient's family the decision was made to move the patient to inpatient hospice and this was arranged for him.      Acute hypoxic and hypercarbic respiratory failure  Severe sepsis secondary to community-acquired pneumonia  Methadone overdose  COPD, probable  Initiated on broad-spectrum antibiotics on admission.  Presented with elevated lactate, leukocytosis and profound endorgan damage.  - Ceftriaxone. Started broad spectrum antibiotics on admission (1/22). Final dose 1/29 to complete 7 day course.   - Wean oxygen as tolerated. Currently on 5-6 L by nasal cannula or mask.  - Transitioning to hospice and comfort care       Acute encephalopathy secondary to anoxic brain injury  Brain imaging reveals probable prior small strokes.  Long-standing history of polysubstance abuse so unclear how much executive functioning will be recoverable.  - Continue supportive care      Dysphasia  Hypernatremia, dehydration  Unable to safely swallow and needs supplemental nutrition. Had an NJ tube while in the intensive care unit which was discontinued after extubation.  - NJ tube placement in radiology 1/27/2018  - Isosource 1.5 at 60 mL/hr with free water flushes      Polysubstance abuse  Chronic low back pain  On chronic methadone. History of overdoses. Urinary tox screen positive for cocaine on admission. Showing some withdrawal signs and symptoms on 1/27/18.  - Continue supportive care with methadone maintenance      New diagnosis  cardiomyopathy  Suspected type II non-ST segment elevation MI  Hypertension  Sinus tachycardia  In the setting of sepsis and unknown period of anoxia prior to admission.  Peak troponin 0 0.6.  TTE on 1/22/18 showed reduced EF of 35-40% with moderate global left ventricular hypokinesis.  - Will not obtain cardiology consult given the goals for hospice now       Stress hyperglycemia  Not known to be diabetic prior to admission  - Continue glucose checks with corrective dose aspart insulin      Acute kidney injury, resolved  Presumed due to hypotension and acute tubular necrosis.  - Monitor trend and avoid nephrotoxic medications      Shock liver, improving  LFTs significantly elevated on admission in the setting of severe sepsis.[JH1.3]    Nikhil Araya, DO    Significant Results and Procedures[JH1.1]   See below[JH1.3]     Pending Results[JH1.1]   No pending results[JH1.3]  Unresulted Labs Ordered in the Past 30 Days of this Admission     No orders found from 11/23/2017 to 1/23/2018.          Code Status[JH1.1]   DNR / DNI[JH1.3]       Primary Care Physician   Kodi Gonzalez    Physical Exam            Resp: 18 SpO2: 93 % O2 Device: Oxymask Oxygen Delivery: 4 LPM  Vitals:    01/25/18 0400 01/26/18 0600 01/30/18 0537   Weight: 72 kg (158 lb 11.7 oz) 67.1 kg (147 lb 14.9 oz) 67.4 kg (148 lb 9.4 oz)     Vital Signs with Ranges  Resp:  [18-20] 18  SpO2:  [93 %-95 %] 93 %  I/O last 3 completed shifts:  In: 3177 [I.V.:1179; NG/GT:451]  Out: 1475 [Urine:1475]    Constitutional:[JH1.1] Resting comfortably.  Will answer some questions with 1 word answers.  Has difficulty following simple commands[JH1.3]  Eyes:[JH1.1] Unable to assess extraocular movements but will look towards voice[JH1.3]  Respiratory:[JH1.1] Clear to auscultation.  No respiratory distress[JH1.3]  Cardiovascular:[JH1.1] RRR.  No murmurs[JH1.3]   GI:[JH1.1] Bowel sounds present[JH1.3]  Musculoskeletal:[JH1.1] Trace lower extremity edema[JH1.3]    Neuropsychiatric:[JH1.1] Alert and oriented to person but no place.  Knows the year is 2018 but does not know month or date[JH1.3]    Discharge Disposition[JH1.1]   Discharged to inpatient hospice[JH1.3]  Condition at discharge:[JH1.1] Stable[JH1.3]    Consultations This Hospital Stay   PHARMACY TO DOSE VANCO  PHYSICAL THERAPY ADULT IP CONSULT  OCCUPATIONAL THERAPY ADULT IP CONSULT  NEUROLOGY IP CONSULT  NUTRITION SERVICES ADULT IP CONSULT  PHARMACY IP CONSULT  PALLIATIVE CARE ADULT IP CONSULT  PALLIATIVE CARE ADULT IP CONSULT  SPEECH LANGUAGE PATH ADULT IP CONSULT  SWALLOW EVAL SPEECH PATH AT BEDSIDE IP CONSULT  SOCIAL WORK IP CONSULT  SPIRITUAL HEALTH SERVICES IP CONSULT    Time Spent on this Encounter[JH1.1]   INikhil, personally saw the patient today and spent greater than 30 minutes discharging this patient.[JH1.3]    Discharge Orders     General info for SNF   Length of Stay Estimate: Long Term Care  Condition at Discharge: Terminal  Level of care:skilled   Rehabilitation Potential: Poor  Admission H&P remains valid and up-to-date: Yes  Recent Chemotherapy: N/A  Use Nursing Home Standing Orders: Yes     Reason for your hospital stay   Acute hypoxic respiratory failure     Activity - Up ad lety     DNR/DNI       Discharge Medications   Current Discharge Medication List      START taking these medications    Details   morphine sulfate HIGH CONCENTRATE (ROXANOL CONCENTRATED) 10 mg/0.5 mL (HIGH CONC) solution Place 0.05-0.1 mLs (1-2 mg) under the tongue every 2 hours as needed for shortness of breath / dyspnea, moderate to severe pain or other (or dyspnea)  Qty: 30 mL, Refills: 0    Associated Diagnoses: Acute respiratory failure with hypoxia (H)      acetaminophen (TYLENOL) 32 mg/mL solution 20.3 mLs (650 mg) by Per Feeding Tube route every 6 hours as needed for mild pain or fever  Qty: 300 mL    Associated Diagnoses: Acute respiratory failure with hypoxia (H)      acetaminophen (TYLENOL) 650 MG  Suppository Place 1 suppository (650 mg) rectally every 6 hours as needed for mild pain or fever (temperature over 100  F )  Qty: 60 suppository    Associated Diagnoses: Acute respiratory failure with hypoxia (H)      haloperidol (HALDOL) 2 MG/ML (HIGH CONC) solution Take 0.5-1 mLs (1-2 mg) by mouth every 6 hours as needed for agitation or other (restlessness, nausea, vomiting)  Qty: 60 mL    Associated Diagnoses: Acute respiratory failure with hypoxia (H)      bisacodyl (DULCOLAX) 10 MG Suppository Place 1 suppository (10 mg) rectally daily as needed for other (for no bowel movement for 72 hours)  Qty: 30 suppository    Associated Diagnoses: Acute respiratory failure with hypoxia (H)         CONTINUE these medications which have CHANGED    Details   methadone (DOLOPHINE-INTENSOL) 10 MG/ML (HIGH CONC) solution 6 mLs (60 mg) by Per Feeding Tube route daily  Qty: 30 mL, Refills: 0    Associated Diagnoses: Acute respiratory failure with hypoxia (H)         CONTINUE these medications which have NOT CHANGED    Details   albuterol (PROAIR HFA) 108 (90 BASE) MCG/ACT Inhaler Inhale 2 puffs into the lungs every 6 hours  Qty: 1 Inhaler, Refills: 5    Associated Diagnoses: Wheezing      beclomethasone (QVAR) 80 MCG/ACT Inhaler Inhale 2 puffs into the lungs 2 times daily  Qty: 1 Inhaler, Refills: 11    Associated Diagnoses: Cough         STOP taking these medications       azithromycin (ZITHROMAX) 500 MG tablet Comments:   Reason for Stopping:         fluticasone (GNP FLUTICASONE PROPIONATE) 50 MCG/ACT spray Comments:   Reason for Stopping:         oseltamivir (TAMIFLU) 75 MG capsule Comments:   Reason for Stopping:         gabapentin (NEURONTIN) 600 MG tablet Comments:   Reason for Stopping:         cyclobenzaprine (FLEXERIL) 10 MG tablet Comments:   Reason for Stopping:         varenicline (CHANTIX STARTING MONTH PAK) 0.5 MG X 11 & 1 MG X 42 tablet Comments:   Reason for Stopping:         zolpidem (AMBIEN) 5 MG tablet  Comments:   Reason for Stopping:         DULoxetine (CYMBALTA) 30 MG EC capsule Comments:   Reason for Stopping:             Allergies   Allergies   Allergen Reactions     Acetaminophen Nausea     Data[JH1.1]   Most Recent 3 CBC's:[JH1.3]  Recent Labs   Lab Test  01/29/18   1012  01/28/18   0956  01/27/18   1712  01/27/18   0840  01/25/18   0435   WBC  11.1*   --   13.6*  13.6*  15.8*   HGB   --    --   15.3  15.2  14.3   MCV   --    --   89  87  90   PLT   --   298  267  272  214[JH1.4]      Most Recent 3 BMP's:[JH1.3]  Recent Labs   Lab Test  01/29/18   1012  01/28/18   0956  01/27/18   0840   NA  148*  150*  145*   POTASSIUM  3.8  3.7  3.6   CHLORIDE  113*  117*  113*   CO2  31  28  23   BUN  19  20  16   CR  0.60*  0.61*  0.61*   ANIONGAP  4  5  9   HEATH  7.9*  8.1*  8.3*   GLC  131*  165*  105*[JH1.4]     Most Recent 2 LFT's:[JH1.3]  Recent Labs   Lab Test  01/27/18   0840  01/26/18   0445   AST  53*  32   ALT  118*  156*   ALKPHOS  138  127   BILITOTAL  0.7  0.6[JH1.4]     Most Recent INR's and Anticoagulation Dosing History:  Anticoagulation Dose History     Recent Dosing and Labs Latest Ref Rng & Units 1/22/2018 1/23/2018 1/24/2018    INR 0.86 - 1.14 1.08 1.42(H) 1.17(H)        Most Recent 3 Troponin's:[JH1.3]  Recent Labs   Lab Test  01/23/18   0610  01/22/18   1310  05/17/16   0924   TROPI  0.208*  0.640*  <0.015  The 99th percentile for upper reference range is 0.045 ug/L.  Troponin values in   the range of 0.045 - 0.120 ug/L may be associated with risks of adverse   clinical events.[JH1.4]       Most Recent Cholesterol Panel:[JH1.3]  Recent Labs   Lab Test  04/16/13   0847   CHOL  213*   LDL  144*   HDL  53   TRIG  80[JH1.4]     Most Recent 6 Bacteria Isolates From Any Culture (See EPIC Reports for Culture Details):[JH1.3]  Recent Labs   Lab Test  01/22/18   1310  01/22/18   1255  01/22/18   1015  01/22/18   0952  01/22/18   0910  01/22/18   0848   CULT  No growth  No growth  No growth  No growth   Moderate growth  Normal rivka    Heavy growth  Moraxella (Branhamella) catarrhalis  Beta lactamase positive  *  No growth[JH1.4]     Most Recent TSH, T4 and A1c Labs:[JH1.3]  Recent Labs   Lab Test  01/22/18   1310   TSH  0.72   A1C  5.6     Results for orders placed or performed during the hospital encounter of 01/22/18   Head CT w/o contrast    Narrative    CT SCAN OF THE HEAD WITHOUT CONTRAST   1/22/2018 9:35 AM     HISTORY: Altered mental status.    TECHNIQUE:  Axial images of the head and coronal reformations without  IV contrast material.  Radiation dose for this scan was reduced using  automated exposure control, adjustment of the mA and/or kV according  to patient size, or iterative reconstruction technique.    COMPARISON: None.    FINDINGS: There is an old infarct in the right basal ganglia region  and a small cystic area in the genu of the corpus callosum which is  also probably an old infarct. There is also a tiny old right anterior  thalamic lacunar infarct. Mild cerebral atrophy and some minimal  patchy white matter changes are present in both hemispheres. There is  no evidence for intracranial hemorrhage, mass effect, acute infarct,  or skull fracture. Extensive paranasal sinus disease is seen with  air-fluid levels. Some vascular calcifications also noted.      Impression    IMPRESSION:  1. Chronic intraparenchymal brain changes. No evidence for any acute  intracranial process.  2. Extensive paranasal sinus disease with fluid levels. An acute  sinusitis could be present. Clinical correlation suggested.    EMERITA MOORE MD   XR Chest Port 1 View    Narrative    XR CHEST PORT 1 VW 1/22/2018 8:57 AM     HISTORY: decreasd loc;       Impression    IMPRESSION: Linear atelectasis or fibrosis at the left lung base.  Additional mild hazy opacity of the left lung base posterior to the  heart could represent pneumonia or aspiration.    LULÚ KAISER MD   XR Chest Port 1 View    Narrative    XR CHEST PORT 1 VW  1/22/2018 9:18 AM    HISTORY: Decreased level of consciousness.    COMPARISON: January 22, 2018.      Impression    IMPRESSION: An endotracheal tube terminates 8.7 cm above the joyce.  There is patchy opacification of the left lung base, worsened compared  to prior exam. No pneumothorax.    BRANNON MONTIEL MD   Chest  XR, 1 view PORTABLE    Narrative    XR CHEST PORT 1 VW 1/22/2018 10:01 AM    HISTORY: Endotracheal tube placement.    COMPARISON: January 22, 2018.      Impression    IMPRESSION: The endotracheal tube terminates 6.5 cm from the joyce.  Nasogastric tube extends into the stomach beyond the lower margin the  study. Patchy opacification of left lung base as before. No  pneumothorax.    BRANNON MONTIEL MD   MR Brain w/o & w Contrast    Narrative    MRI BRAIN WITHOUT AND WITH CONTRAST  1/23/2018 5:28 PM    HISTORY: Acute encephalopathy, concern for anoxic brain injury,  sustained clonus on exam.     TECHNIQUE: Multiplanar, multisequence MRI of the brain without and  with 8mL Gadavist.    COMPARISON: Head CT 1/22/2018.    FINDINGS: Areas of restricted diffusion in the deep periventricular  white matter left greater than right in the region of the body of the  lateral ventricles. There is also restricted diffusion in the white  matter surrounding the occipital horns of the lateral ventricles, the  posterior right centrum semiovale, as well as the right parietal lobe  cortex. The areas of restricted diffusion are also associated with T2  hyperintensity.    No evidence of acute intracranial hemorrhage. No mass effect or  midline shift. Chronic lacunar infarct in the right caudate head with  hemosiderin staining suggesting that it may have been a hemorrhagic  infarct. Ventricular size within normal limits without evidence of  hydrocephalus. The basal cisterns remain patent,     No abnormal intracranial enhancement.    ET tube and enteric tube are present. There is near complete  opacification of the paranasal  sinuses with an air-fluid layer in the  maxillary sinuses. This is not unexpected in the setting of  intubation.      Impression    IMPRESSION:    1. Areas of ischemia in the deep periventricular white matter and to a  lesser extent the right centrum semiovale and right parietal lobe  cortex. Given the distribution of infarct, this could be hypoxic  ischemic encephalopathy. No evidence of hemorrhagic transformation of  infarct. No significant mass effect or midline shift. No  hydrocephalus.  2. Chronic lacunar infarct in the right caudate head which may have  been hemorrhagic.  3. Extensive sinus mucosal thickening with air-fluid layers. This is  not unexpected in the setting of intubation.      AILYN ESPINAL MD   MR Cervical Spine w/o & w Contrast    Narrative    MRI CERVICAL SPINE WITHOUT AND WITH CONTRAST  1/23/2018  5:19 PM     HISTORY: Three plus hyperreflexia throughout upper and lower  extremities, and sustained ankle clonus.     TECHNIQUE: Multiplanar, multisequence MRI of the cervical spine  without and with 8mL Gadavist.    COMPARISON: None.    FINDINGS: No abnormal signal within the visualized spinal cord. No  abnormal intramedullary or leptomeningeal enhancement of the spinal  cord.    Normal cervical lordosis. Anterior posterior alignment of the spine is  within normal limits. Vertebral body height is maintained without  evidence of fracture. There are no destructive osseous lesions. Marked  loss of intervertebral disc space with disc desiccation at all levels  in the cervical spine. There are Modic-type degenerative endplate  changes at C7-T1 with associated STIR hyperintense marrow edema.    The visualized portions of the brainstem and cerebellum are  unremarkable.    Level by level as follows:    C2-C3: Small posterior disc bulge and bilateral facet hypertrophy  without significant spinal canal or neural foraminal narrowing.     C3-C4: Posterior disc bulge and endplate osteophytic  spurring  asymmetric on the right abuts the ventral right spinal cord. Bilateral  uncinate spurring and facet hypertrophy results in mild bilateral  neural foraminal narrowing.     C4-C5: Posterior disc bulge and endplate osteophytic spurring causes  mild spinal canal narrowing. Bilateral uncinate spurring and facet  hypertrophy results in moderate right and severe left neural foraminal  narrowing.     C5-C6: Posterior disc bulge and endplate osteophytic spurring without  significant spinal canal narrowing. Right greater than left uncinate  spurring and facet hypertrophy results in severe right and moderate  left neural foraminal narrowing.     C6-C7: Posterior disc bulge without significant spinal canal  narrowing. Bilateral uncinate spurring and facet hypertrophy results  in mild bilateral neural foraminal narrowing.     C7-T1: Posterior disc bulge and endplate osteophytic spurring without  spinal canal narrowing. Right greater than left uncinate spurring and  facet hypertrophy results in moderate to severe right and mild left  neural foraminal narrowing.     ET tube and enteric tube partially visualized. Visualized sinuses  appear opacified.      Impression    IMPRESSION:   1. No abnormal signal or enhancement of the cervical spinal cord. No  high-grade spinal canal narrowing or evidence of spinal cord edema.  2. Multilevel degenerative changes in the cervical spine as described  above.    AILYN ESPINAL MD   XR Chest Port 1 View    Narrative    XR CHEST PORT 1 VW  1/27/2018 4:12 AM     INDICATION: Shortness of breath.    COMPARISON: 1/22/2018 at 0956 hours.      Impression    IMPRESSION: Mild atelectasis or infiltrate at the left base behind the  heart. Right lung is clear. Stable heart size near the upper limits of  normal. Endotracheal and enteric tubes have been removed.    KELLEY LEMOS MD   XR Feeding Tube Placement    Narrative    FEEDING TUBE PLACEMENT 1/27/2018 12:16 PM    HISTORY: Nutritional  needs.    COMPARISON: None.    FINDINGS: 2.4 minutes of fluoroscopy were utilized for placement of a  feeding tube.  At the final position, the feeding tube tip was located  in the third portion of the duodenum.  8 mL of Omnipaque 240 contrast  was injected to confirm placement.  There were no complications of the  procedure.    SPOT IMAGES OR CINE RUNS: 2      Impression    IMPRESSION: Successful feeding tube placement.    AILYN ESPINAL MD[JH1.4]         Revision History        User Key Date/Time User Provider Type Action    > JH1.4 1/31/2018  3:41 PM Nikhil Araya,  Physician Sign     JH1.2 1/31/2018  3:28 PM Nikhil Araya,  Physician      JH1.3 1/31/2018  3:19 PM Nikhil Araya,  Physician      JH1.1 1/31/2018  2:33 PM Nikhil Araya DO Physician                      Consult Notes      Consults signed by Maddy Calixto PA-C at 1/29/2018  7:11 PM      Author:  Maddy Calixto PA-C Service:  Hospitalist Author Type:  Physician Assistant - C    Filed:  1/29/2018  7:11 PM Date of Service:  1/26/2018  6:41 PM Creation Time:  1/26/2018  8:25 PM    Status:  Cosign Needed :  Maddy Calixto PA-C (Physician Assistant Jan BURR)    Cosign Required:  Yes             Consult Date:  01/26/2018      REQUESTING PROVIDER:  ICU.      REASON FOR CONSULTATION:  Transfer of care.      HISTORY OF PRESENT ILLNESS:  Joni Muñoz is a 67-year-old male with a past medical history significant for chronic pain on methadone, polysubstance abuse, depression and tobacco use who presented to the Emergency Department on 01/22/2018 after being found in his home unresponsive by family members.  Per family, they went to check on him the day before presentation and found him to be minimally responsive.  They assumed he was sleeping, so they left him alone.  They returned again the following morning to check on him and found him in a similar state.  They became concerned and contacted  911.  When EMS arrived, the patient's O2 sats were in the 70s and he sounded congested and wheezy.  He overall remained unresponsive.  The patient was intubated in the Emergency Department on arrival for airway protection.  ABG showed hypercarbia and lactate was elevated at 2.6.  Remainder of workup was notable for shock liver, acute kidney injury, leukocytosis and chest x-ray showing possible left-sided infiltrate.  The patient was started on broad spectrum antibiotics and transferred to the ICU for ongoing cares.  There was some question initially whether the patient may have overdosed on his methadone as this has reportedly happened in the past per family.  Upon arrival to the ICU, echocardiogram was obtained, which showed presumably new cardiomyopathy with reduced ejection fraction of 35-40% and moderate global hypokinesis.  Troponin was elevated at 0.64.  This was thought to likely be a type 2 NSTEMI due to demand in the setting of severe sepsis.  The following day, he was noted to have clonus on exam prompting a Neurology consult.  He underwent MRI of the cervical spine and brain.  C-spine imaging was unremarkable.  However, brain MRI showed bihemispheric diffuse restrictions, likely resulting from hypoxia.  In the following days, the patient did become slightly more responsive, was able to follow some commands and occasionally communicate with the thumbs up signal.  His methadone clinic was contacted and recommended resuming his prior to admission methadone at half dose.  This was started on 01/25.  The same day, the patient was also successfully extubated and palliative care requested to discuss goals of care in light of the patient's anoxic brain injury with significant deficits and unclear prognosis.  As the patient remained stable post-extubation, Hospitalist Service is consulted today for transfer of care.      The patient is presently evaluated in his room on the neuro floor.  During my exam, he does  open his eyes.  He tracks when spoken to and does follow some commands.  It is clear that he is trying to speak and unable to do so.  Occasionally, he is able to do a thumbs up for me, but overall, ability to communicate is still quite minimal.  The history is limited.      REVIEW OF SYSTEMS:  Limited in light of the patient's difficulty with communication.      PAST MEDICAL HISTORY:   1.  Chronic back pain.  The patient follows with methadone clinic.  Per report of family has a history of prior overdose.   2.  Polysubstance abuse.  He is a known cocaine user and urine drug screen is notably positive for cocaine.   3.  Tobacco use.   4.  Depression.      PAST SURGICAL HISTORY:   1.  Orthopedic surgery.   2.  Hernia repair.   3.  Back surgery.      ALLERGIES:  ACETAMINOPHEN, NAUSEA.      SOCIAL HISTORY:  The patient is reportedly an everyday smoker and is single.  He lives with his niece.      FAMILY HISTORY:  Reviewed in chart.  Father had a history of cerebrovascular disease.      LABORATORY EVALUATION:  CMP today is notable for albumin of 2.3, total protein 6.6, normal alk phos and AST with an elevated ALT of 156, electrolytes are within normal limits.  CBC from 01/25 shows white count of 15.8, hemoglobin 14.3, hematocrit 41.2, platelets 212 with left shift.        PHYSICAL EXAMINATION:   VITAL SIGNS:  Temperature 99.1, heart rate 95, blood pressure 154/72, respiratory rate is 27, oxygen saturation is 94% on 6 liters.   GENERAL:  The patient awakens to voice, appears comfortable.  He remains alert during my visit.  He does track the person in the room who is talking.   EYES:  Pupils are equal and reactive to light, EOMI.   ENT:  Mucous membranes appear moist.   CARDIOVASCULAR:  Regular rate and rhythm, no murmur.   RESPIRATORY:  Lungs with occasional coarse sounds, otherwise clear.  No increased work of breathing.   ABDOMEN:  Positive bowel sounds, soft, no apparent tenderness with palpation.   EXTREMITIES:  Warm  and appear well perfused.  No significant lower extremity edema.  There are +2 dorsalis pedis pulses bilaterally.   NEUROLOGIC:  Again, the patient is alert, clearly trying to communicate but unable to produce any speech during my visit.  He follows some commands including wiggling his toes, squeezing my fingers on the left.   strength is very minimal on the right.      ASSESSMENT:  Joni Muñoz is a 67-year-old male with a history of chronic back pain on methadone, polysubstance abuse, depression and tobacco use who presented to the Emergency Department on 01/22/2018 after being found by family unresponsive.  He was intubated for airway protection and admitted to the care of the intensivists for severe sepsis secondary to pneumonia and encephalopathy.  Hospitalist Service was requested today for transfer of care out of the ICU.   1.  Acute encephalopathy, somewhat improving.  Imaging and overall clinical picture concerning for anoxic brain injury.  He has significant deficits at this time.  He is alert and clearly attempting to communicate, but speech is extremely minimal and he is only able to follow some commands due to motor deficits.  Suspect likely methadone overdose.  Imaging also notes he likely had a history of prior cerebrovascular accidents.  Neurology previously following and has signed off 01/24.  Recommend continuing supportive cares and therapies.  Suspect some level of disability, though the extent of this is not clear at this time.  Palliative care has been consulted.  The patient discussed extensively with Angela Kim this afternoon who held a care conference with the patient's sister (power of ), niece and niece's fiance.  Overall, family would like to give the patient a chance to rehabilitate and see how he does rather than moving towards a hospice approach at this time.  We discussed code status at length with them today.  The patient's sister would like him to remain a full code for  now.  Continue physical therapy, occupational therapy.  Suspect the patient will need acute rehabilitation unit at discharge as he has significant care needs.  Per recommendation of his methadone clinic, a half dose of PTA methadone was resumed while in the ICU and will be continued.  The patient was evaluated by Speech today and failed his swallow study.  In light of need for methadone and anticipated nutritional needs, we will place NG tonight.  We will discuss with the patient's family that he will likely require PEG placement in the future for ongoing nutrition.  We will likely address this on Monday.   2.  Severe sepsis secondary to community-acquired pneumonia.  The patient initially presented with a lactate greater than 2, leukocytosis and signs of end-organ damage.  Suspect community-acquired pneumonia as source in light of infiltrate seen on chest x-ray.  He was started on broad spectrum antibiotics, which were changed to azithromycin and Rocephin once the patient arrived in the ICU.  As above, he was initially intubated for airway protection and has been extubated onto 5-6 liters of supplemental oxygen at this time.  Last fever documented 01/25/2018 at 2200 hours of 100.4.  The patient's blood pressure has been on the hypertensive side.  Last white blood cell count on 01/25 was 15.8.  Continue ceftriaxone.  Today is day 6 of 7.  Wean oxygen as able.  Repeat CBC in the morning.     3.  Acute hypoxic and hypercarbic respiratory failure in the setting of suspected methadone overdose, community-acquired pneumonia and possible underlying chronic obstructive pulmonary disease.  The patient has been extubated and is maintaining appropriate saturations on 5-6 liters of supplemental oxygen at this time.  He is continuing to require suctioning throughout today.  Continue Rocephin as above.  Scheduled nebulizers q.4 hours.  Wean oxygen as able.  Continue QVAR.  Nursing to suction.   4.  New cardiomyopathy, suspected  type 2 non-ST segment elevation myocardial infarction in the setting of sepsis.  An echocardiogram on 01/22/2018 showed a reduced ejection fraction of 35-40% with moderate global left ventricular hypokinesis, thought likely secondary to demand in the setting of the patient's acute illness.  Cardiology consult was considered but postponed until the patient stabilized.  Troponin peak was 0.6.  May consider curbside in Cardiology prior to discharge.  Suspect a workup would be pursued potentially later on when the patient is further stabilized and goals of care are clear.  The patient started on Coreg 6.25 mg b.i.d. by intensivist.  We will continue with hold parameters.   5.  Hypertension.  Continue Coreg as above and p.r.n. hydralazine is also available if needed.   6.  Acute kidney injury, resolved.  Avoid nephrotoxic agents.  BMP in the a.m.   7.  Shock liver, improving.  LFTs significantly elevated initially in the setting of severe sepsis, suspected shock liver.  These have improved with fluid resuscitation and treatment of infection.  A gastrointestinal source is not suspected.  CMP in the morning.     8.  Stress hyperglycemia.  The patient is not a known diabetic.  Blood sugars are under good control today.  Continue q.4 hours sliding scale insulin for now.   9.  Fluids, electrolytes and nutrition.  The patient briefly received tube feeds 01/24 through 01/25 while in the ICU.  His NG tube was removed when he was extubated with plans for a speech evaluation.  Unfortunately, the patient did not do well with speech and they recommended he continue to be n.p.o.  Discussed with Angela with Palliative Care who has spoken with the family.  For tonight, we will place NG for administration of his methadone.  He will likely need placement of a PEG tube in the coming days.  We will ask Nutrition to reinitiate tube feeds in the morning and for now, we will continue normal saline at 100 mL per hour, cautiously in the setting  of his reduced ejection fraction.      CODE STATUS:  The patient is full code.  Again, Palliative Care is following along and this was discussed with the family today at length who elects to continue with current code status.      DISPOSITION:  Anticipate the patient will require an ARU at discharge.  This may be difficult in the setting of his methadone use.  Social Work is following.      The patient was seen and examined with Dr. Chase Vincent who agrees with the above plan.         CHASE VINCENT MD       As dictated by MADDY CALIXTO PA-C            D: 2018   T: 2018   MT: DARSHAN      Name:     JONI JEWELL   MRN:      [id number]        Account:       [id number]   :      1950           Consult Date:  2018      Document: [id number][KG1.1]         Revision History        User Key Date/Time User Provider Type Action    > KG1.1 2018  7:11 PM Maddy Calixto PA-C Physician Assistant - C Sign            Consults by Tiffanie Kim APRN CNP at 2018  2:11 PM     Author:  Tiffanie Kim APRN CNP Service:  Palliative Author Type:  Nurse Practitioner    Filed:  2018  2:11 PM Date of Service:  2018  2:11 PM Creation Time:  2018  2:00 PM    Status:  Signed :  Tiffanie Kim APRN CNP (Nurse Practitioner)         [facility]    Palliative Care Consultation     Joni Jewell  MRN# [id number]  Date of Admission:  2018  Date of Service (when I saw the patient): 18  Reason for consult: Consulted by Dr. Justice for Goals of care    Assessment & Plan   Joni Jewell is a 67 year old male with PMH significant for chronic back pain, polysubstance abuse (cocaine) on methadone therapy, past unintentional drug overdose, and recent dx PNA/bronchitis/influenza A who presents with AMS and acute hypoxic respiratory failure. He was intubated on admission and being treated for severe sepsis likely 2/2 PNA. He has now been  successfully extubated. 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 We are consulted for goals of care.     Symptoms/Recommendations   Care conference tomorrow at 1430. Pt's haoece Nataliia and her fiance Raymon will be in attendance. Pt's sister Sonia will be patched in via phone. I offered for his other siblings to attend, but family states that they are not available. I will be in attendance, along with ICU team and hopefully NCC.     Support/Coping  -Abdelrahman William and her fiance Raymon are closest with pt. He is otherwise not  and has no children. He has 4 siblings, Sonia is the sister most involved.     Decisional Support, Goals of Care, Counseling & Coordination  Decisional Capacity Intact?  -No  Health Care Directive on File?  -No  Code Status/Resuscitation Preferences?  -Full     Discussion  Visited Saurav this AM. He is post-extubation and breathing comfortably on facemask. He is alert, yet lethargic. He makes eye contact with me, but does not track with his eyes. He is able to squeeze with his right hand, but not his left. He is able to spontaneously move his left foot, but not his right. He is unable to meaningfully respond at this time.     I called his niece Nataliia via phone, along with sister Sonia. Introduced the scope of our practice to Nataliia and Sonia. Discussed our potential roles for symptom management, support/coping, and decisional support (aka goals of care). Sonia is familiar with palliative care, as she met us during one of her hospitalizations.     I updated them on Saurav's condition today. They are open to having a care conference tomorrow at 1630.     Case reviewed with ICU team and bedside RN.     Thank you for involving us in the care of this patient and family. We will continue to follow. Please do not hesitate to contact me with questions or concerns or the on-call provider  for our team if evening or weekend.    Vianney ANDERSON, YAMILETH  Palliative Medicine   Pager [phone]    Attestation:  Total time on the floor involved in the patient's care: 70 minutes  Total time spent in counseling/care coordination: >50%    Chief Complaint   AMS, hypoxia     History is obtained from the staff, family and extensive chart review.     Past Medical History    I have reviewed this patient's medical history and updated it with pertinent information if needed.   Past Medical History:   Diagnosis Date     Allergic rhinitis, cause unspecified      Back pain, chronic      Depressive disorder      Scoliosis (and kyphoscoliosis), idiopathic      Substance abuse      Tobacco use disorder        Past Surgical History   I have reviewed this patient's surgical history and updated it with pertinent information if needed.  Past Surgical History:   Procedure Laterality Date     BACK SURGERY       HERNIA REPAIR  1969     ORTHOPEDIC SURGERY      L arm        Social History   [place]    Family system: Niece Nataliia and her fiance Raymon are closest with pt. He is otherwise not  and has no children. He has 4 siblings, Sonia is the sister most involved.     Self-identified support system: As above     [place]ployment[place]education[place]    Activities/interests: [place]    Use of community resources: [place]    Jainism affiliation: [place]    Involvement in vj community: ND    Impact of illness on patient: Pt remains critically ill in the ICU. He is now extubated. He is likely to have severe disability of his brain injury.     Family History   I have reviewed this patient's family history and updated it with pertinent information if needed.   Family History   Problem Relation Age of Onset     Unknown/Adopted Mother      CEREBROVASCULAR DISEASE Father      DIABETES No family hx of      Coronary Artery Disease No family hx of      Hypertension No family hx of      Hyperlipidemia No family hx of      Breast Cancer No family hx  of      Colon Cancer No family hx of      Prostate Cancer No family hx of      Other Cancer No family hx of      Depression No family hx of      Anxiety Disorder No family hx of      MENTAL ILLNESS No family hx of      Substance Abuse No family hx of      Anesthesia Reaction No family hx of      Asthma No family hx of      OSTEOPOROSIS No family hx of      Genetic Disorder No family hx of      Thyroid Disease No family hx of      Obesity No family hx of        Allergies   Allergies   Allergen Reactions     Acetaminophen Nausea       Medications   Current Facility-Administered Medications Ordered in Epic   Medication Dose Route Frequency Last Rate Last Dose     [START ON 1/26/2018] pantoprazole (PROTONIX) suspension 40 mg  40 mg Per Feeding Tube Daily         acetaminophen (TYLENOL) tablet 650 mg  650 mg Oral Q6H PRN   650 mg at 01/24/18 0038     carvedilol (COREG) tablet 6.25 mg  6.25 mg Oral or Feeding Tube BID w/meals   6.25 mg at 01/25/18 0800     methadone (DOLOPHINE-INTENSOL) 10 MG/ML (HIGH CONC) solution 60 mg  60 mg Oral Daily   60 mg at 01/25/18 0919     oxymetazoline (AFRIN) 0.05 % spray 2 spray  2 spray Both Nostrils BID         0.9% sodium chloride infusion   Intravenous Continuous 100 mL/hr at 01/25/18 0542       hydrALAZINE (APRESOLINE) injection 10 mg  10 mg Intravenous Q4H PRN   10 mg at 01/24/18 0848     labetalol (NORMODYNE/TRANDATE) injection 10-20 mg  10-20 mg Intravenous Q4H PRN   20 mg at 01/24/18 0607     glucose 40 % gel 15-30 g  15-30 g Oral Q15 Min PRN        Or     dextrose 50 % injection 25-50 mL  25-50 mL Intravenous Q15 Min PRN        Or     glucagon injection 1 mg  1 mg Subcutaneous Q15 Min PRN         chlorhexidine (PERIDEX) 0.12 % solution 15 mL  15 mL Mouth/Throat Q12H   15 mL at 01/25/18 0722     naloxone (NARCAN) injection 0.1-0.4 mg  0.1-0.4 mg Intravenous Q2 Min PRN         insulin aspart (NovoLOG) inj (RAPID ACTING)  1-4 Units Subcutaneous Q4H   Stopped at 01/23/18 2210      ipratropium - albuterol 0.5 mg/2.5 mg/3 mL (DUONEB) neb solution 3 mL  3 mL Nebulization Q4H   3 mL at 01/25/18 1142     albuterol neb solution 2.5 mg  2.5 mg Nebulization Q2H PRN         fentaNYL (PF) (SUBLIMAZE) injection  mcg   mcg Intravenous Q1H PRN   100 mcg at 01/25/18 0355     propofol (DIPRIVAN) infusion  5-75 mcg/kg/min Intravenous Continuous 4.5 mL/hr at 01/25/18 0016 10 mcg/kg/min at 01/25/18 0016    And     propofol (DIPRIVAN) infusion 10-20 mg  10-20 mg Intravenous Q30 Min PRN   20 mg at 01/22/18 2329     enoxaparin (LOVENOX) injection 40 mg  40 mg Subcutaneous Q24H   40 mg at 01/24/18 1606     ondansetron (ZOFRAN-ODT) ODT tab 4 mg  4 mg Oral Q6H PRN        Or     ondansetron (ZOFRAN) injection 4 mg  4 mg Intravenous Q6H PRN         senna-docusate (SENOKOT-S;PERICOLACE) 8.6-50 MG per tablet 1 tablet  1 tablet Oral BID PRN        Or     senna-docusate (SENOKOT-S;PERICOLACE) 8.6-50 MG per tablet 2 tablet  2 tablet Oral BID PRN         potassium chloride SA (K-DUR/KLOR-CON M) CR tablet 20-40 mEq  20-40 mEq Oral Q2H PRN         potassium chloride (KLOR-CON) Packet 20-40 mEq  20-40 mEq Oral or Feeding Tube Q2H PRN   20 mEq at 01/24/18 1606     potassium chloride 10 mEq in 100 mL sterile water intermittent infusion (premix)  10 mEq Intravenous Q1H PRN         potassium chloride 10 mEq in 100 mL intermittent infusion with 10 mg lidocaine  10 mEq Intravenous Q1H PRN         magnesium sulfate 4 g in 100 mL sterile water (premade)  4 g Intravenous Q4H PRN         sodium phosphate 15 mmol in D5W intermittent infusion  15 mmol Intravenous Daily PRN   15 mmol at 01/24/18 1517     sodium phosphate 20 mmol in D5W intermittent infusion  20 mmol Intravenous Q6H PRN   20 mmol at 01/24/18 0144     sodium phosphate 25 mmol in D5W intermittent infusion  25 mmol Intravenous Q8H PRN         cefTRIAXone (ROCEPHIN) 2 g in 20 mL SWFI Premix Syringe  2 g Intravenous Q24H   2 g at 01/25/18 1306     No current  Epic-ordered outpatient prescriptions on file.       Review of Systems   The comprehensive review of systems is not completed at this time. He appears comfortable.     Physical Exam   Temp: 99.1  F (37.3  C) Temp src: Bladder BP: 167/89   Heart Rate: 78 Resp: 22 SpO2: 94 % O2 Device: BiPAP/CPAP    Vitals:    01/23/18 0500 01/24/18 0547 01/25/18 0400   Weight: 74.7 kg (164 lb 10.9 oz) 71.9 kg (158 lb 8.2 oz) 72 kg (158 lb 11.7 oz)     CONSTITUTIONAL: Chronically ill man seen lying in ICU bed in NAD, alert yet lethargic. No verbal response. He appears calm and comfortable.  HEENT: NCAT  RESPIRATORY: NL respiratory effort on facemask   NEUROLOGIC: Alert yet lethargic. Able to make eye contact, but no good eye tracking. Able to squeeze with left hand and move left foot spontaneously, unable to squeeze with right hand or move right foot on command     Data   Results for orders placed or performed during the hospital encounter of 01/22/18 (from the past 24 hour(s))   Potassium   Result Value Ref Range    Potassium 3.9 3.4 - 5.3 mmol/L   Glucose by meter   Result Value Ref Range    Glucose 117 (H) 70 - 99 mg/dL   Glucose by meter   Result Value Ref Range    Glucose 98 70 - 99 mg/dL   Glucose by meter   Result Value Ref Range    Glucose 119 (H) 70 - 99 mg/dL   CBC with platelets differential   Result Value Ref Range    WBC 15.8 (H) 4.0 - 11.0 10e9/L    RBC Count 4.59 4.4 - 5.9 10e12/L    Hemoglobin 14.3 13.3 - 17.7 g/dL    Hematocrit 41.2 40.0 - 53.0 %    MCV 90 78 - 100 fl    MCH 31.2 26.5 - 33.0 pg    MCHC 34.7 31.5 - 36.5 g/dL    RDW 15.3 (H) 10.0 - 15.0 %    Platelet Count 214 150 - 450 10e9/L    Diff Method Automated Method     % Neutrophils 73.1 %    % Lymphocytes 15.0 %    % Monocytes 10.3 %    % Eosinophils 1.1 %    % Basophils 0.2 %    % Immature Granulocytes 0.3 %    Nucleated RBCs 0 0 /100    Absolute Neutrophil 11.5 (H) 1.6 - 8.3 10e9/L    Absolute Lymphocytes 2.4 0.8 - 5.3 10e9/L    Absolute Monocytes 1.6 (H)  0.0 - 1.3 10e9/L    Absolute Eosinophils 0.2 0.0 - 0.7 10e9/L    Absolute Basophils 0.0 0.0 - 0.2 10e9/L    Abs Immature Granulocytes 0.1 0 - 0.4 10e9/L    Absolute Nucleated RBC 0.0    Ammonia   Result Value Ref Range    Ammonia 35 10 - 50 umol/L   Creatinine   Result Value Ref Range    Creatinine 0.55 (L) 0.66 - 1.25 mg/dL    GFR Estimate >90 >60 mL/min/1.7m2    GFR Estimate If Black >90 >60 mL/min/1.7m2   Magnesium   Result Value Ref Range    Magnesium Canceled, Test credited 1.6 - 2.3 mg/dL   Phosphorus   Result Value Ref Range    Phosphorus Canceled, Test credited 2.5 - 4.5 mg/dL   Potassium   Result Value Ref Range    Potassium 3.7 3.4 - 5.3 mmol/L   Magnesium   Result Value Ref Range    Magnesium 2.2 1.6 - 2.3 mg/dL   Phosphorus   Result Value Ref Range    Phosphorus 2.8 2.5 - 4.5 mg/dL   Glucose by meter   Result Value Ref Range    Glucose 122 (H) 70 - 99 mg/dL   Glucose by meter   Result Value Ref Range    Glucose 111 (H) 70 - 99 mg/dL[AW1.1]                    Revision History        User Key Date/Time User Provider Type Action    > AW1.1 1/25/2018  2:11 PM Tiffanie Kim APRN CNP Nurse Practitioner Sign            Consults by Bhavya Augustine RD, LD at 1/24/2018  3:29 PM     Author:  Bhavya Augustine RD, LD Service:  Nutrition Author Type:  Registered Dietitian    Filed:  1/24/2018  3:29 PM Date of Service:  1/24/2018  3:29 PM Creation Time:  1/24/2018  3:12 PM    Status:  Signed :  Bhavya Augustine RD, LD (Registered Dietitian)     Consult Orders:    1. Nutrition Services Adult IP Consult [[id number]] ordered by Jadiel Justice MD at 01/24/18 1154                CLINICAL NUTRITION SERVICES  -  ASSESSMENT NOTE      Recommendations Ordered by Registered Dietitian (RD):   Begin TF Promote with Fiber at 10 mL/hr;  After 8 hrs increase to 20 mL/hr.   Then increase by 15 mL every 12 hrs to preliminary goal 50 mL/hr = 1200 kcals, 76 gm pro (1.1 gm/kg), 1000 mL H20, 17 gm fiber, 166  "gm CHO  Total (TF + Propofol):  1320 kcals (19 kcal/kg).  Free H20 60 mL every 4 hrs   Malnutrition:   Non-Severe malnutrition  In Context of:  Acute illness or injury  Environmental or social circumstances        REASON FOR ASSESSMENT  Joni Muñoz is a 67 year old male seen by Registered Dietitian for Provider Order - Registered Dietitian to Assess and Order TF per Medical Nutrition protocol      NUTRITION HISTORY  - Unable to obtain nutrition history as pt intubated and no family available.  Per RN, family told her that his diet is mostly food from [employer].  He drinks a significant amount of caffeine, up to 8 [employer] drinks per day.  No food allergies/intolerances.  Note pt had been diagnosed with bronchitis on 1/13.      CURRENT NUTRITION ORDERS  Diet Order:     NPO   Was asked to initiate TF via NG tube.    Current Intake/Tolerance:  NPO x 3 days.      PHYSICAL FINDINGS  Observed  Muscle Wasting - temporal, clavicle  Subcutaneous fat loss - arms  Obtained from Chart/Interdisciplinary Team  None noted    ANTHROPOMETRICS  Height: 5' 10\"  Admit Weight:  70.3 kg  Body mass index is 22.28 kg/(m^2).  Weight Status:  Normal BMI  IBW:  75.4 kg  % IBW:  93%  Weight History: Down 4.3 kg (6%) over the past week[MH1.1]    Wt Readings from Last 20 Encounters:   01/24/18 71.9 kg (158 lb 8.2 oz)   01/13/18 74.6 kg (164 lb 8 oz)   09/28/17 73.5 kg (162 lb)   09/21/17 73.9 kg (163 lb)   07/12/17 73.5 kg (162 lb)   06/28/17 72.6 kg (160 lb)   02/22/17 72.6 kg (160 lb)   12/14/16 75.8 kg (167 lb)   12/08/16 75.8 kg (167 lb)   09/06/16 72.4 kg (159 lb 9.6 oz)   05/17/16 74.8 kg (165 lb)   12/03/15 76.7 kg (169 lb)   12/01/15 77.1 kg (170 lb)   03/26/15 79.8 kg (176 lb)   02/11/15 78.9 kg (174 lb)   01/20/15 78.9 kg (174 lb)   01/13/15 81.2 kg (179 lb)   12/30/14 77.6 kg (171 lb)   12/15/14 77.6 kg (171 lb)   12/02/14 81.6 kg (180 lb)[MH1.2]       LABS  Labs reviewed  K 3.3 (L)  Phos 2.3 (L) --> pt received IV " replacements  Mg yesterday 2.3 (NL).  LFTs high d/t shock liver - improving    MEDICATIONS  Medications reviewed  NaCl at 100 mL/hr - for fluid delivery  Propofol at 4.5 mL/hr = 120 kcals (lipid) - for sedation    Dosing Weight:  70.3 kg (admit wt)     ASSESSED NUTRITION NEEDS PER APPROVED PRACTICE GUIDELINES:  2/3 estimated needs during 1st week for permissive underfeedin6806-1720 kcals (17-20 kcal/kg), 60-74 gm pro (0.8-1.1 gm/kg)    Estimated Final Energy Needs:  4331-7013 kcals (25-30 Kcal/Kg)  Justification: maintenance  Estimated Final Protein Needs:   grams protein (1.3-1.6 g pro/Kg)  Justification: Repletion and hypercatabolism with critical illness  Estimated Fluid Needs:  3924-9201 mL (1 mL/Kcal)  Justification: maintenance    MALNUTRITION:  % Weight Loss:  > 2% in 1 week (severe malnutrition)  % Intake:  <75% for > 7 days (non-severe malnutrition) --> suspect  Subcutaneous Fat Loss:  Upper arm region mild depletion  Muscle Loss:  Temporal region mild depletion and Clavicle bone region mild depletion  Fluid Retention:  None noted    Malnutrition Diagnosis: Non-Severe malnutrition  In Context of:  Acute illness or injury  Environmental or social circumstances    NUTRITION DIAGNOSIS:  Inadequate protein-energy intake related to intubation as evidenced by NPO status, Propofol meeting 10% preliminary energy needs and 0% protein needs, and TF planned    NUTRITION INTERVENTIONS  Recommendations / Nutrition Prescription  Begin TF Promote with Fiber at 10 mL/hr;  After 8 hrs increase to 20 mL/hr.  Then increase by 15 mL every 12 hrs to preliminary goal 50 mL/hr = 1200 kcals, 76 gm pro (1.1 gm/kg), 1000 mL H20, 17 gm fiber, 166 gm CHO  Total (TF + Propofol):  1320 kcals (19 kcal/kg).  Free H20 60 mL every 4 hrs      Implementation  Nutrition education: Not appropriate at this time due to patient condition  Collaboration and Referral of Nutrition care - Discussed pt during ICU interdisciplinary rounds this  morning.  EN Composition, EN Schedule - Entered TF orders in EPIC as above  Feeding Tube Flush - Ordered std H20 flushes while on IVF    Nutrition Goals  TF preliminary goal Promote with Fiber at 50 mL/hr + Propofol will meet % estimated preliminary needs.      MONITORING AND EVALUATION:  Progress towards goals will be monitored and evaluated per protocol and Practice Guidelines    Bhavya Augustine RD, LD, CNSC[MH1.1]           Revision History        User Key Date/Time User Provider Type Action    > MH1.2 1/24/2018  3:29 PM Bhavya Augustine RD, LD Registered Dietitian Sign     MH1.1 1/24/2018  3:12 PM Bhavya Augustine RD, LD Registered Dietitian             Consults by Denny Padilla MD at 1/23/2018  2:54 PM     Author:  Denny Padilla MD Service:  Neuro ICU Author Type:  Physician    Filed:  1/23/2018  3:41 PM Date of Service:  1/23/2018  2:54 PM Creation Time:  1/23/2018  2:53 PM    Status:  Signed :  Denny Padilla MD (Physician)     Consult Orders:    1. Neurology IP Consult: Critical Care (patient in or going to ICU); Patient to be seen: Routine - within 24 hours; ? anoxic encephalopathy; Consultant may enter orders: Yes [[id number]] ordered by Jadiel Justice MD at 01/23/18 1230                NEUROCRITICAL CARE CONSULT NOTE    Reason for Consult:  Clonus of lower extremities        Consult requested by:  Reshma     Consult Team:  Neuro-Critical Care    Patient Name:  Joni Muñoz       Date of Admission:  1/22/2018       Problem List[BM1.1]    Active Hospital Problems    Encephalopathy acute[BM1.2]    Stroke Risk Factors:  Cocaine use, tobacco use[BM1.1]    Past Medical History   Past Medical History:   Diagnosis Date     Allergic rhinitis, cause unspecified      Back pain, chronic      Depressive disorder      Scoliosis (and kyphoscoliosis), idiopathic      Substance abuse      Tobacco use disorder        Past Surgical History   Past Surgical  History:   Procedure Laterality Date     BACK SURGERY       HERNIA REPAIR  1969     ORTHOPEDIC SURGERY      L arm        Family History   Family History   Problem Relation Age of Onset     Unknown/Adopted Mother      CEREBROVASCULAR DISEASE Father      DIABETES No family hx of      Coronary Artery Disease No family hx of      Hypertension No family hx of      Hyperlipidemia No family hx of      Breast Cancer No family hx of      Colon Cancer No family hx of      Prostate Cancer No family hx of      Other Cancer No family hx of      Depression No family hx of      Anxiety Disorder No family hx of      MENTAL ILLNESS No family hx of      Substance Abuse No family hx of      Anesthesia Reaction No family hx of      Asthma No family hx of      OSTEOPOROSIS No family hx of      Genetic Disorder No family hx of      Thyroid Disease No family hx of      Obesity No family hx of        Social History   Social History     Social History     Marital status: Single     Spouse name: N/A     Number of children: N/A     Years of education: N/A     Occupational History     Not on file.     Social History Main Topics     Smoking status: Current Every Day Smoker     Packs/day: 0.50     Years: 30.00     Types: Cigarettes     Smokeless tobacco: Current User     Alcohol use No     Drug use: No     Sexual activity: Yes     Partners: Female     Other Topics Concern     Parent/Sibling W/ Cabg, Mi Or Angioplasty Before 65f 55m? Yes      Service No     Blood Transfusions No     Caffeine Concern No     Occupational Exposure No     Hobby Hazards No     Sleep Concern No     Stress Concern No     Weight Concern No     Special Diet No     Back Care Yes     Exercise Yes     Bike Helmet No     Seat Belt Yes     Self-Exams No     Social History Narrative       Allergies   Allergies   Allergen Reactions     Acetaminophen Nausea[BM1.2]          Brief summary of hospital stay to date:    66yo man presenting to the hospital from home[BM1.1]  "after being found minimally responsive in the basement of his sister's house where he lives.  He was last seen the day prior to admission sleeping down the basement and minimally responsive.  When checked on again on the day of admission in the morning he was again minimally responsive and they contacted 911.  While being transported to this facility via EMS he was noted that his oxygen saturation was in the 70s, and with minimal response to noxious stimuli.  Of note he was diagnosed with bronchitis on  with a recorded fluid exposure for which she was given Tamiflu and azithromycin with a plan to follow-up in 4 weeks.  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  He was intubated upon arrival to the emergency room and remains intubated today.  He was sedated on propofol up until this morning when the propofol was finally held.  With the propofol off he was noted to have clonus in bilateral lower extremities at the ankles.  His mental status is such that he is not able to follow commands, but responds to auditory stimuli in that he will look in the general direction of the sound.[BM1.3]    Present Medications[BM1.1]    oxymetazoline  2 spray Both Nostrils BID     chlorhexidine  15 mL Mouth/Throat Q12H     insulin aspart  1-4 Units Subcutaneous Q4H     ipratropium - albuterol 0.5 mg/2.5 mg/3 mL  3 mL Nebulization Q4H     pantoprazole (PROTONIX) IV PEDS/NICU  40 mg Intravenous Daily     enoxaparin  40 mg Subcutaneous Q24H     cefTRIAXone  2 g Intravenous Q24H     azithromycin  250 mg Intravenous Q24H       NaCl 150 mL/hr at 18 0810     propofol (DIPRIVAN) infusion Stopped (18 0955)[BM1.2]       EXAMINATION    Vital Signs[BM1.1]  BP (!) 167/99  Temp 99.7  F (37.6  C)  Resp 27  Ht 1.778 m (5' 10\")  Wt 74.7 kg (164 lb 10.9 oz)  SpO2 96%  BMI 23.63 kg/m2[BM1.2]    Tmax: Temp (24hrs), Av.2  F (37.9  C), Min:98.1  F (36.7  C), Max:101.1  F (38.4  C)      Intake/Output: "   Intake/Output Summary (Last 24 hours) at 01/23/18 1455  Last data filed at 01/23/18 1400   Gross per 24 hour   Intake             3787 ml   Output             2455 ml   Net             1332 ml       General Examination[BM1.1]   General State of Health: healthy looking, not in distress, not in pain  Level of Alertness: awake, lying in bed off propofol, not following commands  Orientation: does not respond to choices about orientation[BM1.3]    Nutrition[BM1.1]   Active Diet Order      NPO for Medical/Clinical Reasons Except for: Meds, NPO but receiving Tube Feeding[BM1.2]    NeuroCritical Neurologic Examination[BM1.1]         Cranial Nerves:  Pupils 5 mm, reactive. EOMI. corneal reflex present, cough and gag present           Motor: Appears to be full strength throughout with increased tone of the right lower extremity and normal bulk         Reflexes: Diffusely hyperreflexic with 3+ pectoralis reflex as well as 3+ reflexes throughout except for at the ankle which are 4+ with sustained clonus.  He has Supa sign bilaterally.  He has a questionable palmomental reflex on the left.  Toes are mute bilaterally to Babinski and Oppenheim       Sensory: Intact to noxious stimuli bilaterally              Coordination:   Unable to assess due to patient mental status       Gait: Unable to assess due to patient's medical condition[BM1.3]      Cardiovascular:[BM1.1]  RRR[BM1.3]  Pulmonary:[BM1.1]  on mechanical ventilation[BM1.3]  Abdominal:[BM1.1]  soft, non-tender, non-distended[BM1.3]  Genitourinary:[BM1.1] Adequate urine output in last 24 hours[BM1.3]  Extremities:[BM1.1]  no edema[BM1.3]    Laboratory Findings[BM1.1]    Data[BM1.2]     CMP[BM1.1]   Recent Labs  Lab 01/23/18  0610 01/22/18  1310 01/22/18  0848   * 144 140   POTASSIUM 3.4 5.4* 4.4   CHLORIDE 114* 110* 104   CO2 25 23 26   ANIONGAP 8 11 10   GLC 89 100* 92   BUN 24 32* 34*   CR 0.89 1.78*  Canceled, Test credited 1.94*   GFRESTIMATED 86 38*   Canceled, Test credited 35*   GFRESTBLACK >90 46*  Canceled, Test credited 42*   HEATH 7.4* 7.3* 8.2*   PROTTOTAL 6.0* 6.5* 8.0   ALBUMIN 2.2* 2.5* 3.3*   BILITOTAL 0.4 0.6 0.8   ALKPHOS 100 115 153*   * 749* 1225*   * 708* 940*[BM1.2]        CBC[BM1.1]   Recent Labs  Lab 01/23/18  0610 01/22/18  1310 01/22/18  0848   WBC 20.1* 18.2* 19.0*   RBC 4.39* 4.73 5.38   HGB 13.7 15.0 17.1   HCT 39.5* 43.3 49.3   MCV 90 92 92   MCH 31.2 31.7 31.8   MCHC 34.7 34.6 34.7   RDW 15.5* 15.2* 15.4*    276  Canceled, Test credited 283[BM1.2]       INR, PTT[BM1.1]   Recent Labs  Lab 01/23/18  0610 01/22/18  0848   INR 1.42* 1.08[BM1.2]        Arterial Blood Gas[BM1.1]   Recent Labs  Lab 01/22/18  1450 01/22/18  1020   PH 7.40 7.27*   PCO2 39 48*   PO2 85 210*   HCO3 24 22   O2PER 40% VENT 80%[BM1.2]       UA[BM1.1]    Recent Labs  Lab 01/22/18  1015   COLOR Yellow   APPEARANCE Clear   URINEGLC Negative   URINEBILI Negative   URINEKETONE Negative   SG 1.010   UBLD Negative   URINEPH 6.5   PROTEIN Negative   NITRITE Negative   LEUKEST Negative   RBCU <1   WBCU <1[BM1.2]       Micro[BM1.1]   Recent Labs  Lab 01/22/18  1548  01/22/18  1310  01/22/18  1015   SDES Nares  < > Blood Left Arm  < > Catheterized Urine   SREQ  --   --   --   --  Specimen received in preservative   CULT  --   --  No growth after 10 hours  < > No growth   < > = values in this interval not displayed.[BM1.2]       Radiological Data[BM1.1]  Data   Recent Results (from the past 48 hour(s))   XR Chest Port 1 View    Narrative    XR CHEST PORT 1 VW 1/22/2018 8:57 AM     HISTORY: decreasd loc;       Impression    IMPRESSION: Linear atelectasis or fibrosis at the left lung base.  Additional mild hazy opacity of the left lung base posterior to the  heart could represent pneumonia or aspiration.    LULÚ KAISER MD   XR Chest Port 1 View    Narrative    XR CHEST PORT 1 VW 1/22/2018 9:18 AM    HISTORY: Decreased level of consciousness.    COMPARISON:  January 22, 2018.      Impression    IMPRESSION: An endotracheal tube terminates 8.7 cm above the joyce.  There is patchy opacification of the left lung base, worsened compared  to prior exam. No pneumothorax.    BRANNON MONTIEL MD   Head CT w/o contrast    Narrative    CT SCAN OF THE HEAD WITHOUT CONTRAST   1/22/2018 9:35 AM     HISTORY: Altered mental status.    TECHNIQUE:  Axial images of the head and coronal reformations without  IV contrast material.  Radiation dose for this scan was reduced using  automated exposure control, adjustment of the mA and/or kV according  to patient size, or iterative reconstruction technique.    COMPARISON: None.    FINDINGS: There is an old infarct in the right basal ganglia region  and a small cystic area in the genu of the corpus callosum which is  also probably an old infarct. There is also a tiny old right anterior  thalamic lacunar infarct. Mild cerebral atrophy and some minimal  patchy white matter changes are present in both hemispheres. There is  no evidence for intracranial hemorrhage, mass effect, acute infarct,  or skull fracture. Extensive paranasal sinus disease is seen with  air-fluid levels. Some vascular calcifications also noted.      Impression    IMPRESSION:  1. Chronic intraparenchymal brain changes. No evidence for any acute  intracranial process.  2. Extensive paranasal sinus disease with fluid levels. An acute  sinusitis could be present. Clinical correlation suggested.    EMERITA MOORE MD   Chest  XR, 1 view PORTABLE    Narrative    XR CHEST PORT 1 VW 1/22/2018 10:01 AM    HISTORY: Endotracheal tube placement.    COMPARISON: January 22, 2018.      Impression    IMPRESSION: The endotracheal tube terminates 6.5 cm from the joyce.  Nasogastric tube extends into the stomach beyond the lower margin the  study. Patchy opacification of left lung base as before. No  pneumothorax.    BRANNON MONTIEL MD[BM1.2]          ASSESSMENT AND PLAN[BM1.1]  67-year-old presented  with diffuse encephalopathy of unclear origin at this point in time.  He does have an infectious process going on with respect to his known bronchitis and exposure to flu.  His procalcitonin is markedly elevated suggesting some sort of a pro-inflammatory state.  Mental status today seems to be improved from what is described upon initial presentation.  He looks around the room though does not respond to command.  He appears stuporous at this time.  Diffuse hyperreflexia is interesting involved he has frontal release signs as well as global hyperreflexia.  He does have some known spinal disease though this is at the lumbar level.  The presence of frontal release signs with associated global hyperreflexia may point to a central origin, but the palmomental reflex was very very minimal.  I suspect there is pathology within the cervical spine given this examination and would recommend a cervical MRI while the brain MRI is being obtained by the primary team.    Plan:  1.  Obtain cervical spine MRI while down for brain MRI  2.  Continue with supportive cares for encephalopathy as he appears to be improving, suggest toxic metabolic origin versus hypoxic source[BM1.3]      Thank you for involving the Neuro-Critical Care Team in the care of this patient.  We will continue to follow.  Please do not hesitate to contact us for any further questions.[BM1.1]    Denny Padilla MD  Vascular Neurology/Neurocritical Care  Text Page - [phone][BM1.3]         Revision History        User Key Date/Time User Provider Type Action    > BM1.3 1/23/2018  3:41 PM Denny Padilla MD Physician Sign     BM1.2 1/23/2018  2:55 PM Denny Padilla MD Physician      BM1.1 1/23/2018  2:53 PM Denny Padilla MD Physician                      Progress Notes - Physician (Notes from 01/28/18 through 01/31/18)      Progress Notes by Geneva Roman LSW at 1/31/2018 11:19 AM     Author:  Geneva Roman LSW Service:  Social Work Author  Type:      Filed:  1/31/2018  3:51 PM Date of Service:  1/31/2018 11:19 AM Creation Time:  1/31/2018 11:19 AM    Status:  Addendum :  Geneva Roman LSW ()         Michael Progress Note  Chart Reviewed, Pt discussed in Interdisciplinary Rounds.   Pt and family planning to discharge to LTC with hospice services through [facility] services.    Intervention:   SW spoke with [facility]. They have declined pt due to past drug use even though pt is unresponsive, they don't feel they can care for pt adequately. Netti faxed last SW per request regarding pt's insurance and will be sending referral on to [facility] for consideration.  Pt's MA is due to close after today and re-application was sent in yesterday.    Plan: LTC placement with hospice services.    Barriers: LTC bed needed. Insurance lapsing  Follow up plan: SW will continue following for placement.  DINAH Hernandez  [facility] Care Transitions  Phone: [phone][SC1.1]    ADDENDUM: Pt has been accepted to [facility] today for admission per Pavel. MICHAEL called and updated pt's sister, Sonia, and she is in agreement with placement. She indicates that she is not able to come to hospsital today and would like pt transported by stretcher to [facility].  Hospitalist notified of placement. Orders for discharge entered into epic.  Discharge order faxed to [facility].  Hol[SC1.2]l[SC1.3]y with  Hospice notified of discharge plan.  PCS form completed, faxed and placed on chart.[SC1.2]  Medication orders will be devoid of ranges and sent to be filled at hospital pharmacy.[SC1.3] MICHAEL called Netti to verify that liquid meds are acceptable and they are; discharge pharmacy updated.[SC1.4]    CC, RN,[facility] all updated.[SC1.2]    PAS-RR    D: Per DHS regulation, MICHAEL completed and submitted PAS-RR to [place] Board on [employer] Direct Connect via the [employer] LinkAge Line.  PAS-RR confirmation # is : [id number].  P: Further  questions may be directed to [facility] LinkAge Line at #[phone], option #4 for \[facility]\""- staff.[SC1.4]    Addendum: Care Transitions Assistant spoke with pt's sister (Sonia) regarding medicare rights and she states she is going to call her daughter (Nataliia) to have her appeal pt's discharge. Both Sonia and Nataliia are ill at home. CTA asked that they contact the hospital staff and notify them if they plan to appeal discharge.[SC1.5]     Revision History        User Key Date/Time User Provider Type Action    > SC1.4 1/31/2018  3:51 PM Geneva Roman LSW  Addend     SC1.5 1/31/2018  3:24 PM Geneva Rmoan LSW  Addend     SC1.3 1/31/2018  3:04 PM Geneva Roman, LSW  Addend     SC1.2 1/31/2018  2:57 PM Geneva Roman, LSW  Addend     SC1.1 1/31/2018 11:27 AM Geneva Roman LSW  Sign            Progress Notes by Vanna Phillips RD, LD at 1/31/2018  7:51 AM     Author:  Vanna Phillips RD, LD Service:  Nutrition Author Type:  Registered Dietitian    Filed:  1/31/2018  7:52 AM Date of Service:  1/31/2018  7:51 AM Creation Time:  1/31/2018  7:51 AM    Status:  Signed :  Vanna Phillips RD, LD (Registered Dietitian)           Pt is now comfort care, TF d/c'd and feeding tube pulled yesterday.  Will sign off. Please reconsult if our services are needed.    Vanna Phillips RD  Pager [phone] (M-F)            [phone] (W/E & Hol)[CM1.1]       Revision History        User Key Date/Time User Provider Type Action    > CM1.1 1/31/2018  7:52 AM Vanna Phillips RD, TYRA Registered Dietitian Sign            Progress Notes by Marli Hernandez at 1/30/2018 10:46 AM     Author:  Marli Hernandez Service:  Social Work Author Type:      Filed:  1/30/2018  4:14 PM Date of Service:  1/30/2018 10:46 AM Creation Time:  1/30/2018 10:46 AM    Status:  Addendum :  Marli Hernandez ()         ELI    I:  SW was asked to attend Palliative meeting w/patient's sister  and niece.  Sister stated she has accepted patient's condition and asks to proceed with finding placement for patient with hospice.  We discussed LTC bed vs [facility].  Sister asks for the following referrals to be sent:  #1-[facility]; #2-[facility]; #3-[facility] OR [facility].  SW placed call to [facility] who stated they have no male opening until Thursday or Friday 2/1-2/2.  As anticipated discharge date is prior to this, SW placed referrals to SNF's thru DOD.  Updated sister and niece.  Niece also brought patient's most current [place] statement that [facility] reported they are waiting for to continue patient's Medicaid (see SW note from 1/29).  SW made copy of document and faxed to Team 140 @ [phone].  SW[CS1.1] attempted to call[CS1.2] to Team 140[CS1.1] @[CS1.2]  to update them as patient's MA is set to terminate 1/31/18[CS1.1], however, was on hold for 22 minutes, unable to continue on hold.  No VM[CS1.2].[CS1.1] [facility] meeting is set for 11AM. Will close SW consult for Hospice.[CS1.3]     P:  Continue to follow.    DINAH Awan[CS1.1]    UPDATE@1118:  [facility] states they have no openings today and none anticipated for tomorrow. [facility] has no LTC beds on floor 2, but are checking on floor 3 and will call SW back.[CS1.4]  [facility] has declined patient in EPIC.[CS1.5] SW updated sister/niece who requested additional referrals be sent to:  [facility][CS1.6] (declined in [facility])[CS1.7]; [facility][CS1.6] ([CS1.7]No beds available; has a 6 month wait list) a[CS1.8][facility] in [place][CS1.6] (no beds per Roxie)[CS1.7], which was done[CS1.5] [facility][CS1.6].[CS1.5]  Family requests no[CS1.9] direct[CS1.10] [facility]  LTC referrals. ([CS1.9]i.e.[CS1.10] [facility], [place])[CS1.9]     UPDATE@1246:  Catalino Berry in admissions at [facility], their Regional office was  needed to review referral as patient has history of cocaine use (but were made aware patient is now unresponsive and will arrive on Hospice).  Also per Kristi, the Regional office has declined taking patient.[CS1.11]  MICHAEL sent additional referrals thru DOD to:  [facility] (per Sugar in admissions they have a LTC opening) and [facility].  Awaiting call backs.[CS1.12]     UPDATE@1518:  Sugar continues to work on possible placement for patient[CS1.13] at [facility][CS1.14].  SW confirmed w/bedside RN patient's NG tube has been removed.[CS1.13]  Awaiting call back[CS1.14]. Sister and niece will need to be updated if patient is accepted.  This facility was not one they requested but is closest to [place] where they reside[CS1.15]    UPDATE@1543:  MICHAEL was able to reach Flakita @[facility] Team 140 to update her that requested paperwork was faxed to them earlier today regarding patient's Medicaid.  Per Flakita, due to patient's circumstances at this time (pt in need of SNF placement), she stated she will keep patient's MA open in [facility] (which can be viewed outside of the [facility]), but stated it will still auto close in [place] (just within [facility]) on 2/1/18.[CS1.16]       Revision History        User Key Date/Time User Provider Type Action    > CS1.3 1/30/2018  4:14 PM Stovern, Marli  Addend     [N/A] 1/30/2018  3:51 PM Stovern, Marli  Addend     CS1.15 1/30/2018  3:50 PM Stovern, Marli  Addend     CS1.14 1/30/2018  3:49 PM Stovern, Marli  Addend     CS1.16 1/30/2018  3:43 PM Stovern, Marli       CS1.13 1/30/2018  3:19 PM Stovern, Marli  Addend     CS1.12 1/30/2018  2:08 PM Stovern, Marli  Addend     [N/A] 1/30/2018  1:52 PM Stovern, Marli  Addend     CS1.8 1/30/2018  1:52 PM Marli Hernandez Worker Addend     CS1.2 1/30/2018  1:51 PM Marli Hernandez  Worker      CS1.11 1/30/2018 12:49 PM Stovern, Marli  Addend     CS1.10 1/30/2018 12:44 PM Stovern, Marli  Addend     CS1.9 1/30/2018 12:43 PM Stovern, Marli  Addend     CS1.7 1/30/2018 12:40 PM Stovern, Marli  Addend     CS1.5 1/30/2018 12:02 PM Stovern, Marli  Addend     CS1.6 1/30/2018 11:59 AM Stovern, Marli  Addend     CS1.4 1/30/2018 11:21 AM Stovern, Marli  Addend     [N/A] 1/30/2018 10:52 AM Stovern, Marli  Addend     CS1.1 1/30/2018 10:50 AM Stovern, Marli  Sign            Progress Notes by Kamran Beth RN at 1/30/2018 12:30 PM     Author:  Kamran Beth RN Service:  (none) Author Type:  Registered Nurse    Filed:  1/30/2018  3:31 PM Date of Service:  1/30/2018 12:30 PM Creation Time:  1/30/2018  3:31 PM    Status:  Signed :  Kamran Beth RN (Registered Nurse)         Patient made comfort care at 1100, TF stopped and NG tube pulled at 1215,  per order, IVF stopped, kept IV access for IV Robinal to manage secretions.[RL1.1]       Revision History        User Key Date/Time User Provider Type Action    > RL1.1 1/30/2018  3:31 PM Kamran Beth, RN Registered Nurse Sign            Progress Notes by Nikhil Araya DO at 1/30/2018  2:21 PM     Author:  Nikhil Araya DO Service:  Hospitalist Author Type:  Physician    Filed:  1/30/2018  3:18 PM Date of Service:  1/30/2018  2:21 PM Creation Time:  1/30/2018  2:21 PM    Status:  Signed :  Nikhil Araya DO (Physician)         [facility]    Hospitalist Progress Note    Assessment & Plan   Joni Muñoz is a 67 year old male with chronic back pain and use of methadone, history of opiate overdose, polysubstance abuse including cocaine, tobacco abuse, depression who was admitted on 1/22/2018 after being found unresponsive by family.  Intubated for airway protection and  remained in the intensive care unit through 1/26/18.  Treating for acute hypoxic and hypercarbic respiratory failure, anoxic brain injury, acute encephalopathy, severe sepsis secondary to community acquired versus aspiration pneumonia, new diagnosis cardiomyopathy.     Acute hypoxic and hypercarbic respiratory failure  Severe sepsis secondary to community-acquired pneumonia  Methadone overdose  COPD, probable  Initiated on broad-spectrum antibiotics on admission.  Presented with elevated lactate, leukocytosis and profound endorgan damage.  - Ceftriaxone. Started broad spectrum antibiotics on admission (1/22). Final dose 1/29 to complete 7 day course.   - Wean oxygen as tolerated. Currently on 5-6 L by nasal cannula or mask.  - Transitioning to hospice and comfort care       Acute encephalopathy secondary to anoxic brain injury  Brain imaging reveals probable prior small strokes.  Long-standing history of polysubstance abuse so unclear how much executive functioning will be recoverable.  - Continue supportive care      Dysphasia  Hypernatremia, dehydration  Unable to safely swallow and needs supplemental nutrition. Had an NJ tube while in the intensive care unit which was discontinued after extubation.  - NJ tube placement in radiology 1/27/2018  - Isosource 1.5 at 60 mL/hr with free water flushes      Polysubstance abuse  Chronic low back pain  On chronic methadone. History of overdoses. Urinary tox screen positive for cocaine on admission. Showing some withdrawal signs and symptoms on 1/27/18.  - Continue supportive care with methadone maintenance      New diagnosis cardiomyopathy  Suspected type II non-ST segment elevation MI  Hypertension  Sinus tachycardia  In the setting of sepsis and unknown period of anoxia prior to admission.  Peak troponin 0 0.6.  TTE on 1/22/18 showed reduced EF of 35-40% with moderate global left ventricular hypokinesis.  - Will not obtain cardiology consult given the goals for hospice  now       Stress hyperglycemia  Not known to be diabetic prior to admission  - Continue glucose checks with corrective dose aspart insulin      Acute kidney injury, resolved  Presumed due to hypotension and acute tubular necrosis.  - Monitor trend and avoid nephrotoxic medications      Shock liver, improving  LFTs significantly elevated on admission in the setting of severe sepsis.    DVT Prophylaxis: Pneumatic Compression Devices  Code Status: DNR/DNI    Disposition: Expected discharge to inpatient hospice once able to find a bed.    Nikhil Araya, DO  Text Page (7am to 6pm)    Interval History   Patient seen and examined.  Not responding to verbal stimuli.  Transitioning to hospice and comfort cares.  Awaiting bed for inpatient hospice.    -Data reviewed today: I reviewed all new labs and imaging results over the last 24 hours. I personally reviewed no images or EKG's today.    Physical Exam   Temp: 98.2  F (36.8  C) Temp src: Axillary BP: (!) 80/40 Pulse: 83 Heart Rate: 83 Resp: 20 SpO2: 90 % O2 Device: Oxymask Oxygen Delivery: 5 LPM  Vitals:    01/25/18 0400 01/26/18 0600 01/30/18 0537   Weight: 72 kg (158 lb 11.7 oz) 67.1 kg (147 lb 14.9 oz) 67.4 kg (148 lb 9.4 oz)     Vital Signs with Ranges  Temp:  [97.9  F (36.6  C)-99.2  F (37.3  C)] 98.2  F (36.8  C)  Pulse:  [83-88] 83  Heart Rate:  [75-83] 83  Resp:  [20-22] 20  BP: ()/(40-97) 80/40  SpO2:  [90 %-95 %] 90 %  I/O last 3 completed shifts:  In: 650 [I.V.:380; NG/GT:270]  Out: 1300 [Urine:1300]    Constitutional: Unresponsive to verbal stimuli   Respiratory: Clear to auscultation bilaterally, no crackles or wheezing  Cardiovascular: Regular rate and rhythm,  GI: Normal bowel sounds    Medications       DULoxetine  30 mg Per Feeding Tube BID     methadone  60 mg Per Feeding Tube Daily     fluticasone furoate  1 puff Inhalation QPM       Data     Recent Labs  Lab 01/29/18  1012 01/28/18  0956 01/27/18  1712 01/27/18  0840 01/26/18  5686  01/25/18  0435  01/24/18  0612   WBC 11.1*  --  13.6* 13.6*  --  15.8*  --  19.6*   HGB  --   --  15.3 15.2  --  14.3  --  14.3   MCV  --   --  89 87  --  90  --  89   PLT  --  298 267 272  --  214  --  247   INR  --   --   --   --   --   --   --  1.17*   * 150*  --  145* 142  --   < > 141   POTASSIUM 3.8 3.7  --  3.6 3.6 3.7  < > 3.6   CHLORIDE 113* 117*  --  113* 108  --   < > 109   CO2 31 28  --  23 25  --   < > 26   BUN 19 20  --  16 13  --   < > 11   CR 0.60* 0.61*  --  0.61* 0.55* 0.55*  < > 0.53*   ANIONGAP 4 5  --  9 9  --   < > 6   HEATH 7.9* 8.1*  --  8.3* 8.2*  --   < > 8.1*   * 165*  --  105* 100*  --   < > 111*   ALBUMIN  --   --   --  2.5* 2.3*  --   < > 2.3*   PROTTOTAL  --   --   --  6.6* 6.6*  --   < > 6.6*   BILITOTAL  --   --   --  0.7 0.6  --   < > 0.5   ALKPHOS  --   --   --  138 127  --   < > 114   ALT  --   --   --  118* 156*  --   < > 366*   AST  --   --   --  53* 32  --   < > 98*   < > = values in this interval not displayed.    Imaging:   No results found for this or any previous visit (from the past 24 hour(s)).[JH1.1]      Revision History        User Key Date/Time User Provider Type Action    > JH1.1 1/30/2018  3:18 PM Nikhil Araya, DO Physician Sign            Progress Notes by Merna Rider, RN at 1/30/2018 12:00 PM     Author:  Merna Rider, RN Service:  Hospice Author Type:  Registered Nurse    Filed:  1/30/2018 12:44 PM Date of Service:  1/30/2018 12:00 PM Creation Time:  1/30/2018 12:00 PM    Status:  Signed :  Merna Rider, RN (Registered Nurse)          Hospice: Met with patients sister Sonia and her daughter/patients sherrie William[HR1.1] to explain the medicare hospice benefit. Both had met with Angela CARRILLO NP from Palliative who did a wonderful job paving the way for hospice care. Neither Nataliia or Sonia had any questions. We discussed the POLST and Sonia deferred all signatures to Nataliia. The POLST is on the front of the chart for hospitalist signature.    We discussed possible places for discharge which Marli Jalloh will be looking into. These include [facility], [facility], [facility], [facility], [facility][HR1.2],[HR1.3]  and [facility]. Nataliia signed the hospice consent forms and was given her signed copies along with the hospice handbook and our 24 hr number.[HR1.2]   Please see palliative care recommendations for comfort meds. The methadone will be a hospice covered medication and would recommend the methadone intensol 10mg/ml.   Please keep me updated to the facility where patient will be accepted as I will need to order oxygen and set up a time for the  Hospice admission team to complete the admit.   Thank you for the referral. Merna Rider RN, BSN   Hospice Admission nurse  [phone][HR1.3]     Revision History        User Key Date/Time User Provider Type Action    > HR1.3 1/30/2018 12:44 PM Merna Rider RN Registered Nurse Sign     HR1.2 1/30/2018 12:17 PM Merna Rider RN Registered Nurse      HR1.1 1/30/2018 12:00 PM Merna Rider RN Registered Nurse             Progress Notes by Tiffanie Kim APRN CNP at 1/30/2018 10:54 AM     Author:  Tiffanie Kim APRN CNP Service:  Palliative Author Type:  Nurse Practitioner    Filed:  1/30/2018 11:10 AM Date of Service:  1/30/2018 10:54 AM Creation Time:  1/30/2018 10:54 AM    Status:  Signed :  Tiffanie Kim APRN CNP (Nurse Practitioner)         [facility]    Palliative Care Progress Note    Joni Muñoz  MRN# [id number]  Date of Admission:  1/22/2018  Date of Service (when I saw the patient): 01/30/2018    Assessment & Plan   Joni Muñoz is a 67 year old male with PMH significant for chronic back pain, polysubstance abuse (cocaine) on methadone maintenance therapy, past unintentional drug overdose, and recent dx PNA/bronchitis/influenza A who presents with AMS and acute hypoxic respiratory failure. He was intubated on admission and being treated for  severe sepsis likely 2/2 PNA. He has now been successfully extubated. 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 We are following for goals of care.      Symptoms/Recommendations   -Transition to comfort measures only, stop checking VS, labs, procedures   -DNR/DNI  -Remove NJ and allow pt to eat and drink for pleasure and comfort, accepting aspiration risk. He likes vanilla ice cream and yogurt per family   -Please do not suction. Use drying agents (robinul, atropine) and repositioning to help with secretions   -Morphine 1-2mg SL every 2hrs PRN for pain, SOB, dyspnea, distress   -Haldol 1-2mg SL every 6hrs PRN for agitation   -APAP suppository PRN for mild pain, fever   -Bisacodyl suppository PRN for constipation   -SW consult for hospice. Family looking at family placement. Merna Rider, [place] hospice liaison, will meet with family at 1100 today   -Methadone plan to be further addressed by hospice (he is otherwise on it for drug addiction therapy). Could consider methadone 20mg SL every 8hrs for total of 60mg per day     Support/Coping  -Niece Nataliia and her fiance Raymon are closest with pt. He is otherwise not  and has no children. He has 4 siblings, Sonia is the sister most involved, others are not reachable. One brother Adin visited last week, was offered to participate in a goals of care meeting, he declined. Nataliia and Sonia are present today   -Pt and family are Worship, requesting to see Father True for last rights      Decisional Support, Goals of Care, Counseling & Coordination  Decisional Capacity Intact?  -No  Health Care Directive on File?  -No. In the absence of a HCD, surrogate decision making defaults to adult sibling, Sonia, per next of kin policy. My assessment thus far is that I am concerned that Sonia may not be able to serve as a surrogate decision maker, given her  "emotional instability. Highly recommend face to face meetings with her and close involvement of her daughter, pt's sherrie William   Code Status/Resuscitation Preferences?  -Changed to DNR/DNI subsequent to today's meeting      Discussion  Visited with sherrie William and pt's sister Sonia in private today. Sonia is quite frail and chronically ill. She is very tearful and emotional. We talk about how Saurav appears to be declining, despite maximal nursing care this past week. He has not been able to participate in rehabilitation, and a recovery process from here is becoming more and more unlikely. I share with them that I believe Saurav is dying and that is largely out of our control at this point. Sonia was quite upset, blaming Saurav for his past actions and poor health. I reframed for Sonia that this is not Saurav's fault and he was very critically ill from a respiratory infection, which ultimately led to this current state.     Nataliia confirms that she is unable to care for Saurav at home. At first she talked about going to TCU for rehabilitation. I again reinforced that Saurav is not participating in cares in a meaningful way and he is unlikely to have any rehabilitation potential moving forward. I again recommended comfort cares and hospice. Nataliia was receptive to this and Sonia ultimately was as well.     Meeting was then joined by ivet Calles who helped with the conversation about facility placement.     I educated family regarding hospice philosophy and prognostic criteria. Dispelled common myths. Discussed what hospice is (and is not), what services are usually provided (and those that are not, ex \"prison care\"), under what circumstances people tend to enroll, and the variety of places people can get hospice care (along with subsequent financial implications). Discussed typical anticipated timing of discharge. They received this information well.    Nataliia recommended that if Saurav's heart stops we allow him to die " naturally. Sonia agreed. We thus agree to DNR/DNI at this time.     We talk about removing the NJ and allowing Saurav to eat and drink for pleasure. We talk about avoiding suctioning, as this generally does not promote comfort. We talk about how we assess for pain at end of life, including verbal and nonverbal signs of discomfort.     I did prepare family that even as we embark on discharge planning with hospice, it is possible that Saurav could further decompensate in the hospital and even die here. They were surprised to hear this, yet appeared to accept it.     Case reviewed with Merna Rider, hospice liaison, and unit MICHAEL Marli.     Thank you for involving us in the care of this patient and family. We will sign off at this time. Please do not hesitate to contact me with questions or concerns or the on-call provider for our team if evening or weekend.    Vianney ANDERSON, [facility]  Palliative Medicine   Pager [phone]    Attestation:  Total time on the floor involved in the patient's care: 60 minutes  Total time spent in counseling/care coordination: >50%    Interval History   No acute events overnight. Pt remains somnolent and unable to participate in therapy or have meaningful interaction with nursing staff.     Medications   Current Facility-Administered Medications Ordered in Epic   Medication Dose Route Frequency Last Rate Last Dose     ipratropium - albuterol 0.5 mg/2.5 mg/3 mL (DUONEB) neb solution 3 mL  3 mL Nebulization Q4H PRN         morphine sulfate HIGH CONCENTRATE (ROXANOL CONCENTRATED) 10 mg/0.5 mL (HIGH CONC) solution 5-10 mg  5-10 mg Sublingual Q2H PRN         acetaminophen (TYLENOL) Suppository 650 mg  650 mg Rectal Q6H PRN         OLANZapine zydis (zyPREXA) ODT half-tab 2.5-5 mg  2.5-5 mg Sublingual Q6H PRN         [START ON 2/2/2018] bisacodyl (DULCOLAX) Suppository 10 mg  10 mg Rectal Once PRN         atropine 1 % ophthalmic solution 1-2 drop  1-2 drop Sublingual Q1H PRN         glycopyrrolate  (ROBINUL) injection 0.2-0.4 mg  0.2-0.4 mg Intravenous Q4H PRN         haloperidol (HALDOL) 2 MG/ML (HIGH CONC) solution 1-2 mg  1-2 mg Oral Q6H PRN         acetaminophen (TYLENOL) solution 650 mg  650 mg Per Feeding Tube Q6H PRN   650 mg at 01/30/18 0849     DULoxetine (CYMBALTA) EC capsule 30 mg  30 mg Per Feeding Tube BID   30 mg at 01/30/18 0848     methadone (DOLOPHINE-INTENSOL) 10 MG/ML (HIGH CONC) solution 60 mg  60 mg Per Feeding Tube Daily   60 mg at 01/30/18 0849     fluticasone furoate (ARNUITY ELLIPTA) 100 MCG/ACT inhalation powder 1 puff  1 puff Inhalation QPM         albuterol neb solution 2.5 mg  2.5 mg Nebulization Q2H PRN         senna-docusate (SENOKOT-S;PERICOLACE) 8.6-50 MG per tablet 1 tablet  1 tablet Oral BID PRN        Or     senna-docusate (SENOKOT-S;PERICOLACE) 8.6-50 MG per tablet 2 tablet  2 tablet Oral BID PRN         No current [facility]-ordered outpatient prescriptions on file.       Physical Exam   Temp: 98.8  F (37.1  C) Temp src: Axillary BP: (!) 173/97 Pulse: 83 Heart Rate: 83 Resp: 20 SpO2: 94 % O2 Device: Oxymask Oxygen Delivery: 5 LPM  Vitals:    01/25/18 0400 01/26/18 0600 01/30/18 0537   Weight: 72 kg (158 lb 11.7 oz) 67.1 kg (147 lb 14.9 oz) 67.4 kg (148 lb 9.4 oz)     CONSTITUTIONAL: Chronically ill elderly man seen sleeping in bed in NAD, did not attempt to wake him up. He appears calm and comfortable.  HEENT: NCAT  RESPIRATORY: NL respiratory effort on oxymask    Data   Results for orders placed or performed during the hospital encounter of 01/22/18 (from the past 24 hour(s))   Glucose by meter   Result Value Ref Range    Glucose 135 (H) 70 - 99 mg/dL   Glucose by meter   Result Value Ref Range    Glucose 115 (H) 70 - 99 mg/dL   Glucose by meter   Result Value Ref Range    Glucose 139 (H) 70 - 99 mg/dL   Glucose by meter   Result Value Ref Range    Glucose 125 (H) 70 - 99 mg/dL   Glucose by meter   Result Value Ref Range    Glucose 125 (H) 70 - 99 mg/dL   Glucose by meter    Result Value Ref Range    Glucose 137 (H) 70 - 99 mg/dL[AW1.1]          Revision History        User Key Date/Time User Provider Type Action    > AW1.1 1/30/2018 11:10 AM Tiffanie Kim APRN CNP Nurse Practitioner Sign            Progress Notes by Marli Hernandez at 1/29/2018 10:02 AM     Author:  Marli Hernandez Service:  Social Work Author Type:      Filed:  1/29/2018  2:49 PM Date of Service:  1/29/2018 10:02 AM Creation Time:  1/29/2018 10:02 AM    Status:  Addendum :  Marli Hernandez ()             I:  MICHAEL continues to assist with discharge planning.  MICHAEL unable to reach [facility] Financial counselor to confirm Medicaid benefits for patient.  MICHAEL placed call to [facility] Team 140, Case # [id number], who confirmed MA is active[CS1.1], however is set to close on 1/31/18 due to need for re-verification paperwork that has been pending ([facility] is awaiting a[CS1.2] credit union[CS1.3] statement from Dec.-Jan 8th)[CS1.2].[CS1.1]  Due to MA coverage, p[CS1.3]atient would not need to discharge to [facility], but could discharge to any LTC facility w/Hospice under Medicare.[CS1.1]  MICHAEL will place call to decision maker sister to review discharge plan and also give sister the fax number to [facility] () to provide them with information requested to avoid closure 1/31/18.     P:  Continue to assist with discharge planning as needed.    DINAH Awan[CS1.2]    UPDATE@1031:  MICHAEL placed call to sister, Sangeeta, who deferred SW to speak with her daughter, Nataliia.  Sister wants patient brought home with Hospice.  Nataliia stated she is not able to meet with [facility] today, but can meet tomorrow at 10 a.m., if possible.  MICHAEL will place call to Liaison to arrange.  Patient will need hospital bed and lift.  Nataliia stated she will be able to provide 24/7 assistance for patient along with her fiance', Raymon.  Nataliia and sister, Sangeeta, are in agreement NG tube will be  removed prior to discharge.  MICHAEL will also place call to Methadone clinic () to confirm someone beyond patient can  his daily dose of Methadone[CS1.4]    UPDATE@1200:  MICHAEL was provided an update from Palliative Care NP who stated she has spoken with patient's sister who is now not agreeing with the plan for Home with Hospice[CS1.3], due to concerns about patient becoming a DNR[CS1.5].[CS1.3] NP also updated SW if patient discharges with Hospice, they would be able to address Methadone needs.[CS1.6] Palliative NP plans to meet with patient, sister and niece (as well as niece's fiance') tomorrow to discuss options.  MICHAEL was asked to set up meeting with Hospice liaison for 11am[CS1.3].  MICHAEL spoke w/liaison on duty today who will try to arrange for 11 am meeting tomorrow.  Liaison will call SW back when confirmed.[CS1.5]     UPDATE@1442:   Hospice liaison has been confirmed for 1/30 at 11AM.[CS1.7] MICHAEL left VM for Nataliia balderas, updating her of [facility]'s need for patient Credit Union statement from Dec. - Jan.8 asa to keep patient's Medicaid from closing 1/31/18.  MICHAEL provided fax number to [facility] on  or offered to fax the document from [facility] tomorrow when they come in for meeting at 10am.[CS1.6]       Revision History        User Key Date/Time User Provider Type Action    > [N/A] 1/29/2018  2:49 PM Stoaylsia, Marli  Addend     CS1.6 1/29/2018  2:43 PM Stovern, Marli  Addend     CS1.7 1/29/2018  2:42 PM Stovern, Marli       [N/A] 1/29/2018 12:48 PM Stovern, Marli  Addend     CS1.5 1/29/2018 12:34 PM Stovern, Marli  Addend     CS1.3 1/29/2018 12:03 PM Stovern, Marli  Addend     CS1.4 1/29/2018 10:36 AM Stovern, Marli  Addend     CS1.2 1/29/2018 10:09 AM Marli Hernandez  Addend     CS1.1 1/29/2018 10:04 AM Marli Hernandez  Sign            Progress Notes by Tiffanie Kim,  APRN CNP at 1/29/2018 11:52 AM     Author:  Tiffanie Kim APRN CNP Service:  Palliative Author Type:  Nurse Practitioner    Filed:  1/29/2018 12:16 PM Date of Service:  1/29/2018 11:52 AM Creation Time:  1/29/2018 11:52 AM    Status:  Signed :  Tiffanie Kim APRN CNP (Nurse Practitioner)         [facility]    Palliative Care Progress Note    Joni Muñoz  MRN# [id number]  Date of Admission:  1/22/2018  Date of Service (when I saw the patient): 01/29/2018    Assessment & Plan   Joni Muñoz is a 67 year old male with PMH significant for chronic back pain, polysubstance abuse (cocaine) on methadone maintenance therapy, past unintentional drug overdose, and recent dx PNA/bronchitis/influenza A who presents with AMS and acute hypoxic respiratory failure. He was intubated on admission and being treated for severe sepsis likely 2/2 PNA. He has now been successfully extubated. 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 We are following for goals of care.      Symptoms/Recommendations   -Care conference tomorrow at 1000 with sherrie William, her fiance Raymon, and pt's sister Sonia. Ideally the covering hospitalist is able to attend. I have requested a hospice consultation for after our meeting.      Support/Coping  -Sherrie William and her fiance Raymon are closest with pt. He is otherwise not  and has no children. He has 4 siblings, Sonia is the sister most involved, others are not reachable. One brother Adin visited last week, was offered to participate in a goals of care meeting, he declined. See further discussion below.       Decisional Support, Goals of Care, Counseling & Coordination  Decisional Capacity Intact?  -No  Health Care Directive on File?  -No. In the absence of a HCD, surrogate decision making defaults to adult sibling, Sonia, per next of kin policy.  "My assessment thus far is that I am concerned that Sonia may not be able to serve as a surrogate decision maker, given her lack of cooperation and emotional instability. See further discussion below.   Code Status/Resuscitation Preferences?  -Remains full code. Again reinforced with family today that I, along with medical team, recommend DNR/DNI     Discussion  Pt's niece Nataliia did return my phone call late this AM. Her mother, pt's sister Sonia, was on the phone as well. Our conversation was quite disjointed. Nataliia just spoke with SW and they are talking about bringing Saurav home. They are trying to figure out the logistics. To clarify, I confirmed that their intention would be to initiate hospice and end of life cares at home. Nataliia consented to this, but Sonia was in some disagreement.     I shared my concern with them that Saurav's condition is not improved over the weekend, and he is actually a bit more somnolent. I express concern that Saurav continues to have difficulty managing his own secretions, which could ultimately lead to ongoing aspiration and respiratory failure at any point. Nataliia talks about needing to set up suction equipment at home. I again clarified the purpose of hospice, and that we ultimately step away from treatments that might be distressing to Saurav, but rather focus on his breathing comfort with medication. This ultimately set Sonia off and she became quite emotionally labile. She stated, \"so you're just going to let him choke to death?\" Despite trying to reframe and explain, I felt that Sonia really was not processing or listening to what I had to say.     I ultimately started to feel some major discordance from Nataliia and Sonia as to their understanding of how we will be caring for Saurav, if he comes home. I do not feel comfortable that they have a good understanding of the hospice plan of care.     We talk about code status and I again recommended DNR/DNI, as I did on Friday, and as Dr. Elkins " had been reviewing with them over the weekend. Nataliia was in complete agreement, while Sonia completely disagreed. I asked her questions as to why she felt code status should remain full, and she really would not comply with the ongoing dialogue. I told her I felt an obligation to follow what Saurav has told Raymon in the past, not wanting to have those aggressive measures. As surrogate decision makers, that is our role, to help carry forward his voice. Sonia was quite flighty in our discussion and promptly changed the subject.     As the conversation unfolded, I started to feel that Sonia was not quite understanding/processing the information I was sharing. This is obviously difficult to fully assess over the phone. I understand she has medical issues, the extent of these issues I am not sure. In our conversation, I started to feel that Sonia may not in fact have the emotional capacity to serve as Saurav's surrogate decision maker. I encouraged her to consider deferring to Nataliia, who is able bodied to come to the hospital and work with staff on a care plan. Sonia declined this. I ultimately asked her, then, to come in to meet me and the medical team to further the discussion, as it was becoming more complicated the longer we spoke on the phone. I also have reservations that because she has not actually seen Saurav, she may not fully understand how serious his condition is. After much coaxing, Sonia did agree to come in to the hospital tomorrow at 1000 to meet me and the medical team to further the care discussion. Nataliia and Raymon will be present as well.     Sonia did ultimately share that she is concerned about finances and how hospice will impact this. Saurav does have MA, which was confirmed by family and SW, but he needs to submit a recent bank statement by the end of the month (2 days) in order to maintain this status. SW will work with sherrie William on this. Other options would be hospice at home vs [facility].      Case reviewed at length with Dr. Elkins and unit MICHAEL Calles. As above, I am quite concerned that Sonia does not have the emotional capacity to serve as Saurav's surrogate decision maker. If this is confirmed tomorrow, or if she continues to disagree with Rere, or if she does not show up for the meeting, I plan to further discuss this with the ethics committee about how we should proceed.     Further discussion points are the pt's methadone and role for NJ.     Thank you for involving us in the care of this patient and family. We will continue to follow. Please do not hesitate to contact me with questions or concerns or the on-call provider for our team if evening or weekend.    Vianney ANDERSON, CNP  Palliative Medicine   Pager [phone]    Attestation:  Total time on the floor involved in the patient's care: 60 minutes  Total time spent in counseling/care coordination: >50%    Interval History   NJ was placed for initiation of TF and methadone administration. Pt remains largely obtunded with no meaningful engagement or participation in rehabilitation over the weekend. Remains on oxymask or O2 supplementation. Hypernatremic.     Medications   Current Facility-Administered Medications Ordered in Epic   Medication Dose Route Frequency Last Rate Last Dose     carvedilol (COREG) tablet 12.5 mg  12.5 mg Oral or Feeding Tube BID w/meals   12.5 mg at 01/29/18 0822     morphine (PF) injection 1-2 mg  1-2 mg Intravenous Q3H PRN   2 mg at 01/27/18 1127     acetaminophen (TYLENOL) solution 650 mg  650 mg Per Feeding Tube Q6H PRN   650 mg at 01/29/18 0822     DULoxetine (CYMBALTA) EC capsule 30 mg  30 mg Per Feeding Tube BID   30 mg at 01/29/18 0822     methadone (DOLOPHINE-INTENSOL) 10 MG/ML (HIGH CONC) solution 60 mg  60 mg Per Feeding Tube Daily   60 mg at 01/29/18 0954     fluticasone furoate (ARNUITY ELLIPTA) 100 MCG/ACT inhalation powder 1 puff  1 puff Inhalation QPM         influenza quadrivalent (PF) vacc  age 3 yrs and older (FLUZONE or Flulaval) injection 0.5 mL  0.5 mL Intramuscular Prior to discharge         pantoprazole (PROTONIX) suspension 40 mg  40 mg Per Feeding Tube Daily   40 mg at 01/29/18 0822     labetalol (NORMODYNE/TRANDATE) injection 40 mg  40 mg Intravenous Q4H PRN   40 mg at 01/26/18 0331     0.9% sodium chloride infusion   Intravenous Continuous 50 mL/hr at 01/29/18 0817       hydrALAZINE (APRESOLINE) injection 10 mg  10 mg Intravenous Q4H PRN   10 mg at 01/29/18 0121     glucose 40 % gel 15-30 g  15-30 g Oral Q15 Min PRN        Or     dextrose 50 % injection 25-50 mL  25-50 mL Intravenous Q15 Min PRN        Or     glucagon injection 1 mg  1 mg Subcutaneous Q15 Min PRN         naloxone (NARCAN) injection 0.1-0.4 mg  0.1-0.4 mg Intravenous Q2 Min PRN         insulin aspart (NovoLOG) inj (RAPID ACTING)  1-4 Units Subcutaneous Q4H   1 Units at 01/29/18 0507     ipratropium - albuterol 0.5 mg/2.5 mg/3 mL (DUONEB) neb solution 3 mL  3 mL Nebulization Q4H   3 mL at 01/29/18 1137     albuterol neb solution 2.5 mg  2.5 mg Nebulization Q2H PRN         enoxaparin (LOVENOX) injection 40 mg  40 mg Subcutaneous Q24H   40 mg at 01/28/18 1707     ondansetron (ZOFRAN-ODT) ODT tab 4 mg  4 mg Oral Q6H PRN        Or     ondansetron (ZOFRAN) injection 4 mg  4 mg Intravenous Q6H PRN         senna-docusate (SENOKOT-S;PERICOLACE) 8.6-50 MG per tablet 1 tablet  1 tablet Oral BID PRN        Or     senna-docusate (SENOKOT-S;PERICOLACE) 8.6-50 MG per tablet 2 tablet  2 tablet Oral BID PRN         potassium chloride SA (K-DUR/KLOR-CON M) CR tablet 20-40 mEq  20-40 mEq Oral Q2H PRN         potassium chloride (KLOR-CON) Packet 20-40 mEq  20-40 mEq Oral or Feeding Tube Q2H PRN   20 mEq at 01/24/18 1606     potassium chloride 10 mEq in 100 mL sterile water intermittent infusion (premix)  10 mEq Intravenous Q1H PRN         potassium chloride 10 mEq in 100 mL intermittent infusion with 10 mg lidocaine  10 mEq Intravenous Q1H PRN          magnesium sulfate 4 g in 100 mL sterile water (premade)  4 g Intravenous Q4H PRN         sodium phosphate 15 mmol in D5W intermittent infusion  15 mmol Intravenous Daily PRN   15 mmol at 01/24/18 1517     sodium phosphate 20 mmol in D5W intermittent infusion  20 mmol Intravenous Q6H PRN   20 mmol at 01/24/18 0144     sodium phosphate 25 mmol in D5W intermittent infusion  25 mmol Intravenous Q8H PRN         cefTRIAXone (ROCEPHIN) 2 g in 20 mL SWFI Premix Syringe  2 g Intravenous Q24H   2 g at 01/28/18 1329     No current Epic-ordered outpatient prescriptions on file.       Physical Exam   Temp: 99.7  F (37.6  C) Temp src: Axillary BP: 134/87   Heart Rate: 90 Resp: 20 SpO2: 93 % O2 Device: Oxymask Oxygen Delivery: 5 LPM  Vitals:    01/24/18 0547 01/25/18 0400 01/26/18 0600   Weight: 71.9 kg (158 lb 8.2 oz) 72 kg (158 lb 11.7 oz) 67.1 kg (147 lb 14.9 oz)     CONSTITUTIONAL: Chronically ill elderly man seen sleeping in bed in NAD, did not attempt to wake him up. He appears calm and comfortable.  HEENT: NCAT  RESPIRATORY: NL respiratory effort on oxymask. Possible eriods of apnea noted.     Data   Results for orders placed or performed during the hospital encounter of 01/22/18 (from the past 24 hour(s))   Glucose by meter   Result Value Ref Range    Glucose 137 (H) 70 - 99 mg/dL   Glucose by meter   Result Value Ref Range    Glucose 122 (H) 70 - 99 mg/dL   Lactic acid level STAT   Result Value Ref Range    Lactic Acid 1.0 0.7 - 2.0 mmol/L   Glucose by meter   Result Value Ref Range    Glucose 123 (H) 70 - 99 mg/dL   Glucose by meter   Result Value Ref Range    Glucose 124 (H) 70 - 99 mg/dL   Glucose by meter   Result Value Ref Range    Glucose 143 (H) 70 - 99 mg/dL   Glucose by meter   Result Value Ref Range    Glucose 127 (H) 70 - 99 mg/dL   Magnesium   Result Value Ref Range    Magnesium 2.1 1.6 - 2.3 mg/dL   Phosphorus   Result Value Ref Range    Phosphorus 2.7 2.5 - 4.5 mg/dL   Prealbumin   Result Value Ref  Range    Prealbumin 14 (L) 15 - 45 mg/dL   Basic metabolic panel   Result Value Ref Range    Sodium 148 (H) 133 - 144 mmol/L    Potassium 3.8 3.4 - 5.3 mmol/L    Chloride 113 (H) 94 - 109 mmol/L    Carbon Dioxide 31 20 - 32 mmol/L    Anion Gap 4 3 - 14 mmol/L    Glucose 131 (H) 70 - 99 mg/dL    Urea Nitrogen 19 7 - 30 mg/dL    Creatinine 0.60 (L) 0.66 - 1.25 mg/dL    GFR Estimate >90 >60 mL/min/1.7m2    GFR Estimate If Black >90 >60 mL/min/1.7m2    Calcium 7.9 (L) 8.5 - 10.1 mg/dL   WBC count   Result Value Ref Range    WBC 11.1 (H) 4.0 - 11.0 10e9/L[AW1.1]          Revision History        User Key Date/Time User Provider Type Action    > AW1.1 1/29/2018 12:16 PM Tiffanie Kim APRN CNP Nurse Practitioner Sign            Progress Notes by Leisa Elkins MD at 1/29/2018  9:10 AM     Author:  Leisa Elkins MD Service:  Hospitalist Author Type:  Physician    Filed:  1/29/2018 11:43 AM Date of Service:  1/29/2018  9:10 AM Creation Time:  1/29/2018  9:10 AM    Status:  Signed :  Leisa Elkins MD (Physician)         [facility]  Hospitalist Progress Note    Assessment & Plan   Joni Muñoz is a 67 year old male with chronic back pain and use of methadone, history of opiate overdose, polysubstance abuse including cocaine, tobacco abuse, depression who was admitted on 1/22/2018 after being found unresponsive by family.  Intubated for airway protection and remained in the intensive care unit through 1/26/18.  Treating for acute hypoxic and hypercarbic respiratory failure, anoxic brain injury, acute encephalopathy, severe sepsis secondary to community acquired versus aspiration pneumonia, new diagnosis cardiomyopathy.  Hospital day #9.    Acute hypoxic and hypercarbic respiratory failure  Severe sepsis secondary to community-acquired pneumonia  Methadone overdose  COPD, probable  Initiated on broad-spectrum antibiotics on admission.  Presented with elevated  lactate, leukocytosis and profound endorgan damage.  -Ceftriaxone. Started broad spectrum antibiotics on admission (1/22)[MW1.1]. Final dose[MW1.2] 1/29 to complete 7 day course.   -Wean oxygen as tolerated. Currently on 6 L by nasal cannula or mask.  -Continue suctioning throughout the day as needed.  -Scheduled nebulizer treatments while awake.  -Continue to update family and clarify goals of care.      Acute encephalopathy secondary to anoxic brain injury  Brain imaging reveals probable prior small strokes.  Long-standing history of polysubstance abuse so unclear how much executive functioning will be recoverable.  -Treat underlying infection and continue supportive care      Dysphasia  Hypernatremia, dehydration  Unable to safely swallow and needs supplemental nutrition. Had an NJ tube while in the intensive care unit which was discontinued after extubation.  -NJ tube placement in radiology 1/27/2018  -Isosource 1.5 at 60 mL/hr with free water flushes      Polysubstance abuse  Chronic low back pain  On chronic methadone. History of overdoses. Urinary tox screen positive for cocaine on admission. Showing some withdrawal signs and symptoms on 1/27/18.  -Continue supportive care with methadone maintenance      New diagnosis cardiomyopathy  Suspected type II non-ST segment elevation MI  Hypertension  Sinus tachycardia  In the setting of sepsis and unknown period of anoxia prior to admission.  Peak troponin 0 0.6.  -TTE on 1/22/18 showed reduced EF of 35-40% with moderate global left ventricular hypokinesis.  -Have not obtained cardiology consultation due to unclear goals of care at this time and to allow potential recovery from respiratory failure  -Increase[MW1.1]d[MW1.2] Coreg to 12.5 mg twice daily on 1/28/2018  -as needed hydralazine      Stress hyperglycemia  Not known to be diabetic prior to admission  -Continue glucose checks with corrective dose aspart insulin      Acute kidney injury, resolved  Presumed due  to hypotension and acute tubular necrosis.  -Monitor trend and avoid nephrotoxic medications      Shock liver, improving  LFTs significantly elevated on admission in the setting of severe sepsis.  -Monitor trend and continue supportive care    Active Diet Order      NPO for Medical/Clinical Reasons Except for: NPO but receiving Tube Feeding    DVT prophylaxis: Pneumatic Compression Devices  Code Status: Full Code Long discussion with the patient's nephew, Raymon, on 1/27/18 who indicates the patient would want to be DNR/DNI and would not want further aggressive medical intervention.  Indicates that he would like to consider hospice care at this time.  They will discuss amongst the family today and revisit the care plan going forward.  Overall prognosis is very poor as patient continues to have minimal improvement in cognition and has continued respiratory decline. If he were to have a cardiorespiratory arrest, predict an extremely poor chance for successful ACLS and he would most certainly be harmed (pain, suffering) by such interventions.[MW1.1]     1/29/2018: Discussed with Palliative Care (Vianney Kim). There is family disagreement on plans, ranging from taking him home on hospice (cared for by niece & nephew) to LTC with hospice to TCU for rehabilitation (proposed by sisterSonia). Care conference arranged for 10 AM on 1/30/2018. There is evidence that sister, Sonia, may not be honoring Joni's wishes and is substituting her own judgment to keep him full code. Niece (and significant other) appear to be better decision-makers in this case.[MW1.2]     Disposition: Expected discharge to LTC with hospice vs TCU. I recommend comfort care and hospice enrollment but need to confirm with family consensus.     Leisa Elkins MD  [phone] (7am - 6pm)  Text Page  ~~~~~~~~~~~~~~~~~~~~~~~~~~~~~~~~~~~~~~~~~~~~~~~  Interval History[MW1.1]   Clinically unchanged. No response to verbal questioning. Have not been able to  wean off oxygen. Completing 7 day antibiotic course today.      No family present at the time of my visit. Discussed with SW, palliative care, charge nurse, bedside nurse.[MW1.2]     -Data reviewed today: I reviewed all new labs and imaging results over the last 24 hours.    Physical Exam   Temp: 99.7  F (37.6  C) Temp src: Axillary BP: 134/87   Heart Rate: 90 Resp: 20 SpO2: 93 % O2 Device: Oxymask Oxygen Delivery: 5 LPM  Vitals:    01/24/18 0547 01/25/18 0400 01/26/18 0600   Weight: 71.9 kg (158 lb 8.2 oz) 72 kg (158 lb 11.7 oz) 67.1 kg (147 lb 14.9 oz)     Vital Signs with Ranges  Temp:  [97.6  F (36.4  C)-102.8  F (39.3  C)] 99.7  F (37.6  C)  Heart Rate:  [80-92] 90  Resp:  [20-26] 20  BP: (134-182)/() 134/87  SpO2:  [93 %-95 %] 93 %  I/O last 3 completed shifts:  In: 1388 [I.V.:878; NG/GT:510]  Out: 1900 [Urine:1900]  Constitutional: Ill-appearing, Has eyes open but does not appear to track or understand conversation, moderate respiratory distress  HEENT: mmm, sclerae anicteric, oxygen mask in place  Respiratory: bilateral crackles and rhonchi, no wheezes  Cardiovascular: Tachycardic, no murmurs  Trace LE edema  GI: soft, non-tender, nondistended  Skin/Integument: diaphoretic, warm, dry, no acute rashes  Musc: Not moving extremities  Neuro: Does not follow commands   Medications     NaCl 50 mL/hr at 01/29/18 0817       carvedilol  12.5 mg Oral or Feeding Tube BID w/meals     DULoxetine  30 mg Per Feeding Tube BID     methadone  60 mg Per Feeding Tube Daily     fluticasone furoate  1 puff Inhalation QPM     influenza quadrivalent (PF) vacc age 3 yrs and older  0.5 mL Intramuscular Prior to discharge     pantoprazole  40 mg Per Feeding Tube Daily     insulin aspart  1-4 Units Subcutaneous Q4H     ipratropium - albuterol 0.5 mg/2.5 mg/3 mL  3 mL Nebulization Q4H     enoxaparin  40 mg Subcutaneous Q24H     cefTRIAXone  2 g Intravenous Q24H       Data     Recent Labs  Lab 01/28/18  0956 01/27/18  2159  01/27/18  0840 01/26/18  0445 01/25/18  0435  01/24/18  0612  01/23/18  0610 01/22/18  1310   WBC  --  13.6* 13.6*  --  15.8*  --  19.6*  --  20.1* 18.2*   HGB  --  15.3 15.2  --  14.3  --  14.3  --  13.7 15.0   MCV  --  89 87  --  90  --  89  --  90 92    267 272  --  214  --  247  --  245 276  Canceled, Test credited   INR  --   --   --   --   --   --  1.17*  --  1.42*  --    *  --  145* 142  --   < > 141  --  147* 144   POTASSIUM 3.7  --  3.6 3.6 3.7  < > 3.6  < > 3.4 5.4*   CHLORIDE 117*  --  113* 108  --   < > 109  --  114* 110*   CO2 28  --  23 25  --   < > 26  --  25 23   BUN 20  --  16 13  --   < > 11  --  24 32*   CR 0.61*  --  0.61* 0.55* 0.55*  < > 0.53*  --  0.89 1.78*  Canceled, Test credited   ANIONGAP 5  --  9 9  --   < > 6  --  8 11   HEATH 8.1*  --  8.3* 8.2*  --   < > 8.1*  --  7.4* 7.3*   *  --  105* 100*  --   < > 111*  --  89 100*   ALBUMIN  --   --  2.5* 2.3*  --   < > 2.3*  --  2.2* 2.5*   PROTTOTAL  --   --  6.6* 6.6*  --   < > 6.6*  --  6.0* 6.5*   BILITOTAL  --   --  0.7 0.6  --   < > 0.5  --  0.4 0.6   ALKPHOS  --   --  138 127  --   < > 114  --  100 115   ALT  --   --  118* 156*  --   < > 366*  --  515* 708*   AST  --   --  53* 32  --   < > 98*  --  245* 749*   LIPASE  --   --   --   --   --   --   --   --   --  48*   TROPI  --   --   --   --   --   --   --   --  0.208* 0.640*   < > = values in this interval not displayed.    Imaging:  No results found for this or any previous visit (from the past 24 hour(s)).[MW1.1]  TT 40 min, cc 30 min. Repeated discussions about plan of care and medications, labs with bedside nurse, palliative care.[MW1.2]      Revision History        User Key Date/Time User Provider Type Action    > MW1.2 1/29/2018 11:43 AM Leisa Elkins MD Physician Sign     MW1.1 1/29/2018  9:10 AM Leisa Elkins MD Physician             Progress Notes by Tiffanie Kim APRN CNP at 1/29/2018 10:15 AM     Author:  Tiffanie Kim  APRN CNP Service:  Palliative Author Type:  Nurse Practitioner    Filed:  1/29/2018 10:17 AM Date of Service:  1/29/2018 10:15 AM Creation Time:  1/29/2018 10:15 AM    Status:  Signed :  Tiffanie Kim APRN CNP (Nurse Practitioner)         Weekend events reviewed with Dr. Elkins. Family was to follow up with Dr. Elkins today in regard to further discussions of goals of care for Saurav. I had also left it with family on Friday that I would reach out to them again today. I called Nataliia and left a voicemail, will wait to see if she returns my call. Called Sonia who did not answer and does not have a voicemail option. Will follow.     Vianney ANDERSON CNP  Palliative Medicine   Pager: [phone][AW1.1]         Revision History        User Key Date/Time User Provider Type Action    > AW1.1 1/29/2018 10:17 AM Tiffanie Kim APRN CNP Nurse Practitioner Sign            Progress Notes by Vanna Phillips RD, LD at 1/29/2018  9:47 AM     Author:  Vanna Phillips RD, LD Service:  Nutrition Author Type:  Registered Dietitian    Filed:  1/29/2018 10:00 AM Date of Service:  1/29/2018  9:47 AM Creation Time:  1/29/2018  9:47 AM    Status:  Signed :  Vanna Phillips RD, LD (Registered Dietitian)         CLINICAL NUTRITION SERVICES - REASSESSMENT NOTE      Future/Additional Recommendations:   Once IVF d/c'd, need to increase H2O flushes to 175 mL q 4 hours for total fluid (TF + flushes) = 2150 mL/day     Malnutrition dx'd 1/26:   Severe malnutrition  In Context of:  Acute illness or injury  Environmental or social circumstances         EVALUATION OF PROGRESS TOWARD GOALS   Diet:  NPO    Nutrition Support:  ND feeding tube was placed 1/27 and TF resumed: Isosource 1.5 at 60 mL/hr:  2160 kcal (31 kcal/kg), 98 g protein (1.4 g/kg), 253 g CHO, 22 g fiber, 1094 mL H2O.  H2O flushes of 60 mL q 4 hours.     Intake/tolerance: No issues. Last documented BM was 1/25    Dosing Weight:  70.3 kg (admit wt)       ASSESSED  NUTRITION NEEDS PER APPROVED PRACTICE GUIDELINES:  Estimated Final Energy Needs:  7921-2153 kcals (25-30 Kcal/Kg)  Justification: maintenance  Estimated Final Protein Needs:   grams protein (1.3-1.6 g pro/Kg)  Justification: repletion and hypercatabolism with critical illness       NEW FINDINGS:   NS IVF @ 50 mL/hr  Hypernatremia continues  Considering hospice[CM1.1]    Vitals:    01/22/18 0849 01/22/18 1200 01/23/18 0500 01/24/18 0547   Weight: 74.6 kg (164 lb 7.4 oz) 70.3 kg (154 lb 15.7 oz) 74.7 kg (164 lb 10.9 oz) 71.9 kg (158 lb 8.2 oz)    01/25/18 0400 01/26/18 0600   Weight: 72 kg (158 lb 11.7 oz) 67.1 kg (147 lb 14.9 oz)[CM1.2]         Previous Goals (1/26):   Patient will receive nutrition within the next 24-48 hours   Evaluation: Met    Previous Nutrition Diagnosis:   Inadequate protein-energy intake related to NPO, TF has been discontinued with extubation as evidenced by meeting 0% needs  Evaluation: Resolved      CURRENT NUTRITION DIAGNOSIS  No nutrition diagnosis identified at this time      INTERVENTIONS  Recommendations / Nutrition Prescription  Continue current TF  Once IVF d/c'd, increase H2O flushes to 175 ml q 4 hr for total of 2150 mL/day (TF + flushes).    Implementation  No interventions    Goals  Isosource 1.5 @ 60 mL/hr will continue to meet assessed needs      MONITORING AND EVALUATION:  Progress towards goals will be monitored and evaluated per protocol and Practice Guidelines    Vanna Phillips RD  Pager [phone] (M-F)            [phone] (W/E & Hol)[CM1.1]             Revision History        User Key Date/Time User Provider Type Action    > CM1.2 1/29/2018 10:00 AM Vanna Phillips RD, TYRA Registered Dietitian Sign     CM1.1 1/29/2018  9:47 AM Vanna Phillips RD, TYRA Registered Dietitian             Progress Notes by Leisa Elkins MD at 1/28/2018  8:16 AM     Author:  White, Leisa Marlena, MD Service:  Hospitalist Author Type:  Physician    Filed:  1/28/2018  4:01  PM Date of Service:  1/28/2018  8:16 AM Creation Time:  1/28/2018  8:16 AM    Status:  Signed :  Leisa Elkins MD (Physician)         [facility]  Hospitalist Progress Note[MW1.1]    Assessment & Plan[MW1.2]   Joni Muñoz is a 67 year old male with chronic back pain and use of methadone, history of opiate overdose, polysubstance abuse including cocaine, tobacco abuse, depression who was admitted on 1/22/2018 after being found unresponsive by family.  Intubated for airway protection and remained in the intensive care unit through 1/26/18.  Treating for acute hypoxic and hypercarbic respiratory failure, anoxic brain injury, acute encephalopathy, severe sepsis secondary to community acquired versus aspiration pneumonia, new diagnosis cardiomyopathy.  Hospital day #8.    Acute hypoxic and hypercarbic respiratory failure  Severe sepsis secondary to community-acquired pneumonia  Methadone overdose  COPD, probable  Initiated on broad-spectrum antibiotics on admission.  Presented with elevated lactate, leukocytosis and profound endorgan damage.  -Continue ceftriaxone[MW1.1]. Started broad spectrum antibiotics on admission (1/22) so final dose will be 1/29 to complete 7 day course.[MW1.3]   -Wean oxygen as tolerated.  Currently on 6 L by nasal cannula or mask.  -Continue suctioning throughout the day as needed[MW1.1].[MW1.3]  -Scheduled nebulizer treatments while awake[MW1.1].[MW1.3]  -Continue to update family and clarify goals of care.     Acute encephalopathy secondary to anoxic brain injury  Brain imaging reveals probable prior small strokes.  Long-standing history of polysubstance abuse so unclear how much executive functioning will be recoverable.  -Treat underlying infection and continue supportive care     Dysphasia[MW1.1]  Hypernatremia, dehydration[MW1.3]  Unable to safely swallow and needs supplemental nutrition.  Had an NJ tube while in the intensive care unit which was  discontinued after extubation.  -NJ tube placement in radiology 1/27/2018  -Isosource 1.5 at 60 mL/hr[MW1.1] with free water flushes[MW1.3]     Polysubstance abuse  Chronic low back pain  On chronic methadone. History of overdoses. Urinary tox screen positive for cocaine on admission. Showing some withdrawal signs and symptoms on 1/27/18.  -Continue supportive care with methadone maintenance     New diagnosis cardiomyopathy  Suspected type II non-ST segment elevation MI  Hypertension[MW1.1]  Sinus tachycardia[MW1.3]  In the setting of sepsis and unknown period of anoxia prior to admission.  Peak troponin 0 0.6.  -TTE on 1/22/18 showed reduced EF of 35-40% with moderate global left ventricular hypokinesis.  -Have not obtained cardiology consultation due to unclear goals of care at this time and to allow[MW1.1] potential[MW1.3] recovery from respiratory failure  -[MW1.1]Increase[MW1.3] Coreg[MW1.1] to 12.5 mg[MW1.3] twice daily[MW1.1] on[MW1.3] 1/28/2018[MW1.4]  -[MW1.3]as needed hydralazine     Stress hyperglycemia  Not known to be diabetic prior to admission  -Continue glucose checks with corrective dose aspart insulin     Acute kidney injury, resolved  Presumed due to hypotension and acute tubular necrosis.  -Monitor trend and avoid nephrotoxic medications     Shock liver, improving  LFTs significantly elevated on admission in the setting of severe sepsis.  -Monitor trend and continue supportive care[MW1.1]    Active Diet Order      NPO for Medical/Clinical Reasons Except for: NPO but receiving Tube Feeding[MW1.2]    DVT prophylaxis: Pneumatic Compression Devices  Code Status:[MW1.1] Full Code[MW1.2] Long discussion with the patient's nephew, Raymon, on 1/27/18 who indicates the patient would want to be DNR/DNI and would not want further aggressive medical intervention.  Indicates that he would like to consider hospice care at this time.  They will discuss amongst the family today and revisit the care plan going  "forward.  Overall prognosis is very poor[MW1.1] as patient continues to have minimal improvement in cognition and has continued respiratory decline[MW1.3].[MW1.1] If he were to have a cardiorespiratory arrest, predict an extremely poor chance for successful ACLS and he would most certainly be harmed (pain, suffering) by such interventions.[MW1.3]     Disposition: Expected discharge to LTC with hospice vs TCU.[MW1.1] I recommend comfort care and hospice enrollment but need to confirm with family consensus on Monday.[MW1.3]     Leisa Elkins[MW1.2], MD  [phone] (7am - 6pm)  Text Page  ~~~~~~~~~~~~~~~~~~~~~~~~~~~~~~~~~~~~~~~~~~~~~~~[MW1.1]  Interval History[MW1.2]   Sodium increased to 150 today. Unable to keep up with free water needs. Continues to clinically decline. Increased suctioning needs. No family in room at the time of my visit.     I remain very concerned that this patient continues at FULL CODE status, despite his previously stated \"no heroic measures\" goal of care with nephew. He would be unlikely to survive an in-hospital arrest and even less likely to ever be weaned off a ventilator if he were intubated again.[MW1.3]      -Data reviewed today: I reviewed all new labs and imaging results over the last 24 hours.[MW1.1]     Physical Exam   Temp: 99.8  F (37.7  C) Temp src: Oral BP: (!) 168/99   Heart Rate: 90 Resp: 28 SpO2: 94 % O2 Device: Oxymask Oxygen Delivery: 6 LPM  Vitals:    01/24/18 0547 01/25/18 0400 01/26/18 0600   Weight: 71.9 kg (158 lb 8.2 oz) 72 kg (158 lb 11.7 oz) 67.1 kg (147 lb 14.9 oz)[MW1.2]     Vital Signs with Ranges[MW1.1]  Temp:  [97.7  F (36.5  C)-101.1  F (38.4  C)] 99.8  F (37.7  C)  Heart Rate:  [] 90  Resp:  [24-40] 28  BP: (132-168)/(81-99) 168/99  SpO2:  [93 %-96 %] 94 %  I/O last 3 completed shifts:  In: 1852 [I.V.:1792]  Out: 1450 [Urine:1450][MW1.2]  Constitutional:[MW1.1] Ill-appearing, Has eyes open[MW1.3] but does not appear to[MW1.1] track or[MW1.3] " understand conversation, moderate respiratory distress  HEENT: mmm, sclerae anicteric, oxygen mask in place  Respiratory: bilateral crackles and rhonchi, no wheezes  Cardiovascular: Tachycardic, no murmurs  Trace LE edema  GI: soft, non-tender, nondistended  Skin/Integument:[MW1.1] diaphoretic,[MW1.3] warm, dry, no acute rashes  Musc: Not moving extremities  Neuro: Does not follow commands[MW1.1]   Medications     NaCl 50 mL/hr at 01/27/18 1852       DULoxetine  30 mg Per Feeding Tube BID     methadone  60 mg Per Feeding Tube Daily     fluticasone furoate  1 puff Inhalation QPM     influenza quadrivalent (PF) vacc age 3 yrs and older  0.5 mL Intramuscular Prior to discharge     pantoprazole  40 mg Per Feeding Tube Daily     carvedilol  6.25 mg Oral or Feeding Tube BID w/meals     insulin aspart  1-4 Units Subcutaneous Q4H     ipratropium - albuterol 0.5 mg/2.5 mg/3 mL  3 mL Nebulization Q4H     enoxaparin  40 mg Subcutaneous Q24H     cefTRIAXone  2 g Intravenous Q24H       Data     Recent Labs  Lab 01/27/18  1712 01/27/18  0840 01/26/18  0445 01/25/18  0435  01/24/18  1030 01/24/18  0612  01/23/18  0610 01/22/18  1310 01/22/18  0848   WBC 13.6* 13.6*  --  15.8*  --   --  19.6*  --  20.1* 18.2* 19.0*   HGB 15.3 15.2  --  14.3  --   --  14.3  --  13.7 15.0 17.1   MCV 89 87  --  90  --   --  89  --  90 92 92    272  --  214  --   --  247  --  245 276  Canceled, Test credited 283   INR  --   --   --   --   --   --  1.17*  --  1.42*  --  1.08   NA  --  145* 142  --   --  143 141  --  147* 144 140   POTASSIUM  --  3.6 3.6 3.7  < > 3.3* 3.6  < > 3.4 5.4* 4.4   CHLORIDE  --  113* 108  --   --  109 109  --  114* 110* 104   CO2  --  23 25  --   --  24 26  --  25 23 26   BUN  --  16 13  --   --  10 11  --  24 32* 34*   CR  --  0.61* 0.55* 0.55*  --  0.52* 0.53*  --  0.89 1.78*  Canceled, Test credited 1.94*   ANIONGAP  --  9 9  --   --  10 6  --  8 11 10   HEATH  --  8.3* 8.2*  --   --  7.8* 8.1*  --  7.4* 7.3* 8.2*    GLC  --  105* 100*  --   --  101* 111*  --  89 100* 92   ALBUMIN  --  2.5* 2.3*  --   --  2.4* 2.3*  --  2.2* 2.5* 3.3*   PROTTOTAL  --  6.6* 6.6*  --   --  6.5* 6.6*  --  6.0* 6.5* 8.0   BILITOTAL  --  0.7 0.6  --   --  0.7 0.5  --  0.4 0.6 0.8   ALKPHOS  --  138 127  --   --  111 114  --  100 115 153*   ALT  --  118* 156*  --   --  339* 366*  --  515* 708* 940*   AST  --  53* 32  --   --  76* 98*  --  245* 749* 1225*   LIPASE  --   --   --   --   --   --   --   --   --  48*  --    TROPI  --   --   --   --   --   --   --   --  0.208* 0.640*  --    < > = values in this interval not displayed.[MW1.2]    Imaging:[MW1.1]  Recent Results (from the past 24 hour(s))   XR Feeding Tube Placement    Narrative    FEEDING TUBE PLACEMENT 1/27/2018 12:16 PM    HISTORY: Nutritional needs.    COMPARISON: None.    FINDINGS: 2.4 minutes of fluoroscopy were utilized for placement of a  feeding tube.  At the final position, the feeding tube tip was located  in the third portion of the duodenum.  8 mL of Omnipaque 240 contrast  was injected to confirm placement.  There were no complications of the  procedure.    SPOT IMAGES OR CINE RUNS: 2      Impression    IMPRESSION: Successful feeding tube placement.    AILYN ESPINAL MD[MW1.2]          Revision History        User Key Date/Time User Provider Type Action    > MW1.4 1/28/2018  4:01 PM Leisa Elkins MD Physician Sign     MW1.3 1/28/2018  3:49 PM Leisa Elkins MD Physician      MW1.2 1/28/2018  8:17 AM Leisa Elkins MD Physician      MW1.1 1/28/2018  8:16 AM Leisa Elkins MD Physician             Progress Notes by Scott Dorsey RT at 1/28/2018  3:33 PM     Author:  Scott Dorsey RT Service:  (none) Author Type:  Respiratory Therapist    Filed:  1/28/2018  3:33 PM Date of Service:  1/28/2018  3:33 PM Creation Time:  1/28/2018  3:33 PM    Status:  Signed :  Scott Dorsey RT (Respiratory Therapist)         Nebs  given as ordered. LS are diminished and coarse t/o and pt is on 5L oxymask. Will continue to follow.  Scott Dorsey[CL1.1]         Revision History        User Key Date/Time User Provider Type Action    > CL1.1 1/28/2018  3:33 PM Scott Dorsey RT Respiratory Therapist Sign                  Procedure Notes     No notes of this type exist for this encounter.         Progress Notes - Therapies (Notes from 01/28/18 through 01/31/18)      Progress Notes by Scott Dorsey RT at 1/28/2018  3:33 PM     Author:  Scott Dorsey RT Service:  (none) Author Type:  Respiratory Therapist    Filed:  1/28/2018  3:33 PM Date of Service:  1/28/2018  3:33 PM Creation Time:  1/28/2018  3:33 PM    Status:  Signed :  Scott Dorsey RT (Respiratory Therapist)         Nebs given as ordered. LS are diminished and coarse t/o and pt is on 5L oxymask. Will continue to follow.  Scott Dorsey[CL1.1]         Revision History        User Key Date/Time User Provider Type Action    > CL1.1 1/28/2018  3:33 PM Scott Dorsey RT Respiratory Therapist Sign

## 2018-01-22 NOTE — IP AVS SNAPSHOT
"[place] [address] [facility]: [phone]                                              INTERAGENCY TRANSFER FORM - PHYSICIAN ORDERS   2018                    Hospital Admission Date: 2018  GUADALUPE JEWELL   : 1950  Sex: Male        Attending Provider: Jadiel Justice MD     Allergies:  Acetaminophen    Infection:  None   Service:  HOSPITALIST    Ht:  1.778 m (5' 10\")   Wt:  67.4 kg (148 lb 9.4 oz)   Admission Wt:  74.6 kg (164 lb 7.4 oz)    BMI:  21.32 kg/m 2   BSA:  1.82 m 2            Patient PCP Information     Provider PCP Type    Kodi Gonzalez MD General      ED Clinical Impression     Diagnosis Description Comment Added By Time Added    Acute respiratory failure with hypoxia (H) [J96.01] Acute respiratory failure with hypoxia (H) [J96.01]  Lynn Pineda MD 2018  9:50 AM    Pneumonia of left lower lobe due to infectious organism (H) [J18.1] Pneumonia of left lower lobe due to infectious organism (H) [J18.1]  Lynn Pineda MD 2018  9:50 AM    Severe sepsis (H) [A41.9, R65.20] Severe sepsis (H) [A41.9, R65.20]  Lynn Pineda MD 2018  9:50 AM    Tobacco use disorder [F17.200] Tobacco use disorder [F17.200]  Lynn Pineda MD 2018  9:50 AM    Endotracheally intubated [Z97.8] Endotracheally intubated [Z97.8]  Lynn Pineda MD 2018  9:51 AM    Acute renal failure, unspecified acute renal failure type (H) [N17.9] Acute renal failure, unspecified acute renal failure type (H) [N17.9]  Lynn Pineda MD 2018 10:06 AM    Liver failure without hepatic coma, unspecified chronicity (H) [K72.90] Liver failure without hepatic coma, unspecified chronicity (H) [K72.90]  Lynn Pineda MD 2018 10:07 AM      Hospital Problems as of 2018              Priority Class Noted POA    Encephalopathy acute Medium  2018 Yes      Non-Hospital Problems as of 2018              Priority Class " Noted    Sprain of great toe Medium  6/21/2012    Chronic pain syndrome Medium  8/16/2012    Moderate episode of recurrent major depressive disorder (H) Medium  10/10/2012    Tobacco use disorder Medium  10/10/2012    Backache Medium  10/10/2012    Degeneration of lumbar or lumbosacral intervertebral disc Medium  10/10/2012    Intervertebral lumbar disc disorder with myelopathy, lumbar region Medium  10/10/2012    Facet arthritis of lumbar region (H) Medium  12/31/2012    Chronic low back pain Medium  12/31/2012    Sacroiliac dysfunction Medium  12/31/2012    Lumbago Medium  4/16/2013    Hyperlipidemia LDL goal <130 Medium  7/9/2013    Cocaine abuse Medium  1/20/2015    Shoulder pain, right Medium  1/19/2016      Code Status History     Date Active Date Inactive Code Status Order ID Comments User Context    1/31/2018  2:32 PM  DNR/DNI [id number]  Nikhil Araya DO Outpatient    1/30/2018 10:52 AM 1/31/2018  2:32 PM DNR/DNI [id number] Code status discussion is appropriately documented in the chart. Tiffanie Kim APRN CNP Inpatient    1/22/2018 12:22 PM 1/30/2018 10:51 AM Full Code [id number]  Jadiel Justice MD Inpatient    8/24/2004 10:33 AM 8/24/2004 10:33 AM  None  Brenda Estrada Demographics         Medication Review      START taking        Dose / Directions Comments    * acetaminophen 32 mg/mL solution   Commonly known as:  TYLENOL   Used for:  Acute respiratory failure with hypoxia (H)        Dose:  650 mg   20.3 mLs (650 mg) by Per Feeding Tube route every 6 hours as needed for mild pain or fever   Quantity:  300 mL   Refills:  0        * acetaminophen 650 MG Suppository   Commonly known as:  TYLENOL   Used for:  Acute respiratory failure with hypoxia (H)        Dose:  650 mg   Place 1 suppository (650 mg) rectally every 6 hours as needed for mild pain or fever (temperature over 100? F )   Quantity:  60 suppository   Refills:  0        bisacodyl 10 MG Suppository   Commonly known as:   DULCOLAX   Used for:  Acute respiratory failure with hypoxia (H)        Dose:  10 mg   Place 1 suppository (10 mg) rectally daily as needed for other (for no bowel movement for 72 hours)   Quantity:  30 suppository   Refills:  0        haloperidol 2 MG/ML (HIGH CONC) solution   Commonly known as:  HALDOL   Used for:  Acute respiratory failure with hypoxia (H)        Dose:  2 mg   Take 1 mL (2 mg) by mouth every 6 hours as needed for agitation or other (restlessness, nausea, vomiting)   Quantity:  60 mL   Refills:  0        morphine sulfate HIGH CONCENTRATE 10 mg/0.5 mL (HIGH CONC) solution   Commonly known as:  ROXANOL CONCENTRATED   Used for:  Acute respiratory failure with hypoxia (H)        Dose:  2 mg   Place 0.1 mLs (2 mg) under the tongue every 2 hours as needed for shortness of breath / dyspnea, moderate to severe pain or other (or dyspnea)   Quantity:  30 mL   Refills:  0        * Notice:  This list has 2 medication(s) that are the same as other medications prescribed for you. Read the directions carefully, and ask your doctor or other care provider to review them with you.      CONTINUE these medications which may have CHANGED, or have new prescriptions. If we are uncertain of the size of tablets/capsules you have at home, strength may be listed as something that might have changed.        Dose / Directions Comments    methadone 10 MG/ML (HIGH CONC) solution   Commonly known as:  DOLOPHINE-INTENSOL   This may have changed:    - medication strength  - how much to take  - how to take this   Used for:  Acute respiratory failure with hypoxia (H)        Dose:  60 mg   Start taking on:  2/1/2018   6 mLs (60 mg) by Per Feeding Tube route daily   Quantity:  30 mL   Refills:  0          CONTINUE these medications which have NOT CHANGED        Dose / Directions Comments    albuterol 108 (90 BASE) MCG/ACT Inhaler   Commonly known as:  PROAIR HFA   Used for:  Wheezing        Dose:  2 puff   Inhale 2 puffs into the  lungs every 6 hours   Quantity:  1 Inhaler   Refills:  5        beclomethasone 80 MCG/ACT Inhaler   Commonly known as:  QVAR   Used for:  Cough        Dose:  2 puff   Inhale 2 puffs into the lungs 2 times daily   Quantity:  1 Inhaler   Refills:  11          STOP taking     azithromycin 500 MG tablet   Commonly known as:  ZITHROMAX           cyclobenzaprine 10 MG tablet   Commonly known as:  FLEXERIL           DULoxetine 30 MG EC capsule   Commonly known as:  CYMBALTA           fluticasone 50 MCG/ACT spray   Commonly known as:  GNP FLUTICASONE PROPIONATE           gabapentin 600 MG tablet   Commonly known as:  NEURONTIN           oseltamivir 75 MG capsule   Commonly known as:  TAMIFLU           varenicline 0.5 MG X 11 & 1 MG X 42 tablet   Commonly known as:  CHANTIX STARTING MONTH [place]UST[place]           zolpidem 5 MG tablet   Commonly known as:  AMBIEN                   Summary of Visit     Reason for your hospital stay       Acute hypoxic respiratory failure             After Care     Activity - Up ad lety           General info for SNF       Length of Stay Estimate: Long Term Care  Condition at Discharge: Terminal  Level of care:skilled   Rehabilitation Potential: Poor  Admission H&P remains valid and up-to-date: Yes  Recent Chemotherapy: N/A  Use Nursing Home Standing Orders: Yes             Statement of Approval     Ordered          01/31/18 6285  I have reviewed and agree with all the recommendations and orders detailed in this document.  EFFECTIVE NOW     Approved and electronically signed by:  Nikhil Araya DO

## 2018-01-22 NOTE — IP AVS SNAPSHOT
"          [facility] [address] [facility]: [phone]                                              INTERAGENCY TRANSFER FORM - LAB / IMAGING / EKG / EMG RESULTS   2018                    Hospital Admission Date: 2018  GUADALUPE JEWELL   : 1950  Sex: Male        Attending Provider: Jadiel Justice MD     Allergies:  Acetaminophen    Infection:  None   Service:  HOSPITALIST    Ht:  1.778 m (5' 10\")   Wt:  67.4 kg (148 lb 9.4 oz)   Admission Wt:  74.6 kg (164 lb 7.4 oz)    BMI:  21.32 kg/m 2   BSA:  1.82 m 2            Patient PCP Information     Provider PCP Type    Kodi Gonzalez MD General         Lab Results - 3 Days      Glucose by meter [[id number]] (Abnormal)  Resulted: 18 0811, Result status: Final result    Ordering provider: Jadiel Justice MD  18 0807 Resulting lab: POINT OF CARE TEST, GLUCOSE    Specimen Information    Type Source Collected On     18 0807          Components       Value Reference Range Flag Lab   Glucose 137 70 - 99 mg/dL H 170            Glucose by meter [[id number]] (Abnormal)  Resulted: 18 0416, Result status: Final result    Ordering provider: Jadiel Justice MD  18 041 Resulting lab: POINT OF CARE TEST, GLUCOSE    Specimen Information    Type Source Collected On     18 041          Components       Value Reference Range Flag Lab   Glucose 125 70 - 99 mg/dL H 170            Glucose by meter [[id number]] (Abnormal)  Resulted: 18 0121, Result status: Final result    Ordering provider: Jadiel Justice MD  18 010 Resulting lab: POINT OF CARE TEST, GLUCOSE    Specimen Information    Type Source Collected On     18 010          Components       Value Reference Range Flag Lab   Glucose 125 70 - 99 mg/dL H 170            Glucose by meter [[id number]] (Abnormal)  Resulted: 18 0101, Result status: Final result    Ordering provider: Jadiel Justice" MD Heath  01/29/18 2022 Resulting lab: POINT OF CARE TEST, GLUCOSE    Specimen Information    Type Source Collected On     01/29/18 2022          Components       Value Reference Range Flag Lab   Glucose 139 70 - 99 mg/dL H 170            Glucose by meter [[id number]] (Abnormal)  Resulted: 01/29/18 1726, Result status: Final result    Ordering provider: Jadiel Justice MD  01/29/18 1704 Resulting lab: POINT OF CARE TEST, GLUCOSE    Specimen Information    Type Source Collected On     01/29/18 1704          Components       Value Reference Range Flag Lab   Glucose 115 70 - 99 mg/dL H 170            Glucose by meter [[id number]] (Abnormal)  Resulted: 01/29/18 1221, Result status: Final result    Ordering provider: Jadiel Justice MD  01/29/18 1210 Resulting lab: POINT OF CARE TEST, GLUCOSE    Specimen Information    Type Source Collected On     01/29/18 1210          Components       Value Reference Range Flag Lab   Glucose 135 70 - 99 mg/dL H 170            Basic metabolic panel [[id number]] (Abnormal)  Resulted: 01/29/18 1149, Result status: Final result    Ordering provider: Leisa Elkins MD  01/29/18 0910 Resulting lab: [facility]    Specimen Information    Type Source Collected On   Blood  01/29/18 1012          Components       Value Reference Range Flag Lab   Sodium 148 133 - 144 mmol/L H FrStHsLb   Potassium 3.8 3.4 - 5.3 mmol/L  FrStHsLb   Chloride 113 94 - 109 mmol/L H FrStHsLb   Carbon Dioxide 31 20 - 32 mmol/L  FrStHsLb   Anion Gap 4 3 - 14 mmol/L  FrStHsLb   Glucose 131 70 - 99 mg/dL H FrStHsLb   Urea Nitrogen 19 7 - 30 mg/dL  FrStHsLb   Creatinine 0.60 0.66 - 1.25 mg/dL L FrStHsLb   GFR Estimate >90 >60 mL/min/1.7m2  FrStHsLb   Comment:  Non  GFR Calc   GFR Estimate If Black >90 >60 mL/min/1.7m2  FrStHsLb   Comment:  African American GFR Calc   Calcium 7.9 8.5 - 10.1 mg/dL L FrStHsLb            WBC count [[id number]] (Abnormal)   Resulted: 01/29/18 1130, Result status: Final result    Ordering provider: Leisa Elkins MD  01/29/18 0910 Resulting lab: [facility]    Specimen Information    Type Source Collected On   Blood  01/29/18 1012          Components       Value Reference Range Flag Lab   WBC 11.1 4.0 - 11.0 10e9/L H FrStHsLb            Phosphorus [[id number]]  Resulted: 01/29/18 0927, Result status: Final result    Ordering provider: Jadiel Justice MD  01/29/18 0000 Resulting lab: [facility]    Specimen Information    Type Source Collected On   Blood  01/29/18 0825          Components       Value Reference Range Flag Lab   Phosphorus 2.7 2.5 - 4.5 mg/dL  FrStHsLb            Prealbumin [[id number]] (Abnormal)  Resulted: 01/29/18 0927, Result status: Final result    Ordering provider: Jadiel Justice MD  01/29/18 0000 Resulting lab: [facility]    Specimen Information    Type Source Collected On   Blood  01/29/18 0825          Components       Value Reference Range Flag Lab   Prealbumin 14 15 - 45 mg/dL L FrStHsLb            Magnesium [[id number]]  Resulted: 01/29/18 0927, Result status: Final result    Ordering provider: Jadiel Justice MD  01/29/18 0000 Resulting lab: [facility]    Specimen Information    Type Source Collected On   Blood  01/29/18 0825          Components       Value Reference Range Flag Lab   Magnesium 2.1 1.6 - 2.3 mg/dL  FrStHsLb            Glucose by meter [[id number]] (Abnormal)  Resulted: 01/29/18 0826, Result status: Final result    Ordering provider: Jadiel Justice MD  01/29/18 0817 Resulting lab: POINT OF CARE TEST, GLUCOSE    Specimen Information    Type Source Collected On     01/29/18 0817          Components       Value Reference Range Flag Lab   Glucose 127 70 - 99 mg/dL H 170            Glucose by meter [[id number]] (Abnormal)  Resulted: 01/29/18 0445, Result status: Final result     Ordering provider: Jadiel Justice MD  01/29/18 0435 Resulting lab: POINT OF CARE TEST, GLUCOSE    Specimen Information    Type Source Collected On     01/29/18 0435          Components       Value Reference Range Flag Lab   Glucose 143 70 - 99 mg/dL H 170            Glucose by meter [[id number]] (Abnormal)  Resulted: 01/29/18 0006, Result status: Final result    Ordering provider: Jadiel Justice MD  01/28/18 2358 Resulting lab: POINT OF CARE TEST, GLUCOSE    Specimen Information    Type Source Collected On     01/28/18 2358          Components       Value Reference Range Flag Lab   Glucose 124 70 - 99 mg/dL H 170            Glucose by meter [[id number]] (Abnormal)  Resulted: 01/28/18 2020, Result status: Final result    Ordering provider: Jadiel Justice MD  01/28/18 2010 Resulting lab: POINT OF CARE TEST, GLUCOSE    Specimen Information    Type Source Collected On     01/28/18 2010          Components       Value Reference Range Flag Lab   Glucose 123 70 - 99 mg/dL H 170            Lactic acid level STAT [[id number]]  Resulted: 01/28/18 1746, Result status: Final result    Ordering provider: Leisa Elkins MD  01/28/18 1707 Resulting lab: [facility]    Specimen Information    Type Source Collected On   Blood  01/28/18 1734          Components       Value Reference Range Flag Lab   Lactic Acid 1.0 0.7 - 2.0 mmol/L  FrStLb            Glucose by meter [[id number]] (Abnormal)  Resulted: 01/28/18 1616, Result status: Final result    Ordering provider: Jadiel Justice MD  01/28/18 1606 Resulting lab: POINT OF CARE TEST, GLUCOSE    Specimen Information    Type Source Collected On     01/28/18 1606          Components       Value Reference Range Flag Lab   Glucose 122 70 - 99 mg/dL H 170            Glucose by meter [[id number]] (Abnormal)  Resulted: 01/28/18 1206, Result status: Final result    Ordering provider: Jadiel Justice MD   01/28/18 1200 Resulting lab: POINT OF CARE TEST, GLUCOSE    Specimen Information    Type Source Collected On     01/28/18 1200          Components       Value Reference Range Flag Lab   Glucose 137 70 - 99 mg/dL H 170            Basic metabolic panel [[id number]] (Abnormal)  Resulted: 01/28/18 1044, Result status: Final result    Ordering provider: Leisa Elkins MD  01/28/18 0000 Resulting lab: [facility]    Specimen Information    Type Source Collected On   Blood  01/28/18 0956          Components       Value Reference Range Flag Lab   Sodium 150 133 - 144 mmol/L H FrStHsLb   Potassium 3.7 3.4 - 5.3 mmol/L  FrStHsLb   Chloride 117 94 - 109 mmol/L H FrStHsLb   Carbon Dioxide 28 20 - 32 mmol/L  FrStHsLb   Anion Gap 5 3 - 14 mmol/L  FrStHsLb   Glucose 165 70 - 99 mg/dL H FrStHsLb   Urea Nitrogen 20 7 - 30 mg/dL  FrStHsLb   Creatinine 0.61 0.66 - 1.25 mg/dL L FrStHsLb   GFR Estimate >90 >60 mL/min/1.7m2  FrStHsLb   Comment:  Non  GFR Calc   GFR Estimate If Black >90 >60 mL/min/1.7m2  FrStHsLb   Comment:  African American GFR Calc   Calcium 8.1 8.5 - 10.1 mg/dL L FrStHsLb            Platelet count [[id number]]  Resulted: 01/28/18 1012, Result status: Final result    Ordering provider: Jadiel Justice MD  01/28/18 0000 Resulting lab: [facility]    Specimen Information    Type Source Collected On   Blood  01/28/18 0956          Components       Value Reference Range Flag Lab   Platelet Count 298 150 - 450 10e9/L  FrStHsLb            Glucose by meter [[id number]] (Abnormal)  Resulted: 01/28/18 0856, Result status: Final result    Ordering provider: Jadiel Justice MD  01/28/18 0853 Resulting lab: POINT OF CARE TEST, GLUCOSE    Specimen Information    Type Source Collected On     01/28/18 0853          Components       Value Reference Range Flag Lab   Glucose 146 70 - 99 mg/dL H 170            Blood culture [[id number]]  Resulted: 01/28/18  0652, Result status: Final result    Ordering provider: Jadiel Justice MD  01/22/18 1222 Resulting lab: [facility]    Specimen Information    Type Source Collected On   Blood  01/22/18 1310   Comment:  Left Arm          Components       Value Reference Range Flag Lab   Specimen Description Blood Left Arm      Culture Micro No growth   75            Blood culture [[id number]]  Resulted: 01/28/18 0652, Result status: Final result    Ordering provider: Jadiel Justice MD  01/22/18 1222 Resulting lab: [facility]    Specimen Information    Type Source Collected On   Blood  01/22/18 1255   Comment:  Right Arm          Components       Value Reference Range Flag Lab   Specimen Description Blood Right Arm      Culture Micro No growth   75            Blood culture [[id number]]  Resulted: 01/28/18 0651, Result status: Final result    Ordering provider: Lynn Pineda MD  01/22/18 0851 Resulting lab: [facility]    Specimen Information    Type Source Collected On   Blood Arm, Right 01/22/18 0848   Comment:  Left Arm          Components       Value Reference Range Flag Lab   Specimen Description Blood Left Arm      Special Requests Aerobic and anaerobic bottles received   FrStHsLb   Culture Micro No growth   75            Blood culture [[id number]]  Resulted: 01/28/18 0651, Result status: Final result    Ordering provider: Lynn Pineda MD  01/22/18 0851 Resulting lab: [facility]    Specimen Information    Type Source Collected On   Blood Arm, Left 01/22/18 0952   Comment:  Right Arm          Components       Value Reference Range Flag Lab   Specimen Description Blood Right Arm      Special Requests Aerobic and anaerobic bottles received   FrStHsLb   Culture Micro No growth   75            Glucose by meter [[id number]] (Abnormal)  Resulted: 01/28/18 0430, Result status:  Final result    Ordering provider: Hermila Hamilton MD  18 0417 Resulting lab: POINT OF CARE TEST, GLUCOSE    Specimen Information    Type Source Collected On     18          Components       Value Reference Range Flag Lab   Glucose 143 70 - 99 mg/dL H 170            Testing Performed By     Lab - Abbreviation Name Director Address Valid Date Range    14 - FrStHsLb [facility] Unknown [address] [place] [address] 05/08/15 1057 - Present    75 - Unknown [facility] Unknown [address] [facility] [address] 01/15/15 1019 - Present    170 - Unknown POINT OF CARE TEST, GLUCOSE Unknown Unknown 10/31/11 1114 - Present            Unresulted Labs     None         ECG/EMG Results      Echocardiogram Complete [[id number]]  Resulted: 18 1550, Result status: In process    Ordering provider: Hermila Hamilton MD  18 151 Performed: 18 1550 - 18 1550    Resulting lab: [facility]             Echo Complete with Lumason [[id number]]  Resulted: 18 1551, Result status: Edited Result - FINAL    Ordering provider: Hermila Hamilton MD  18 151 Resulted by: Kirt Tay MD    Performed: 18 1550 - 18 1623 Resulting lab: RADIOLOGY RESULTS    Narrative:       [id number]  [id number]  [id number]  [id number]^ALFONSO^HERMILA^DEVYN           [facility]  Echocardiography Laboratory  [address] [place] [address]        Name: GUADALUPE JEWELL  MRN: [id number]  : 1950  Study Date: 2018 03:51 PM  Age: 67 yrs  Gender: Male  Patient Location: [facility]  Reason For Study: Abn EKG  Ordering Physician: HERMILA HAMILTON  Referring Physician: Kodi Gonzalez  Performed By: Jose Elias Petit RDCS     BSA: 1.9 m2  Height: 70 in  Weight: 154 lb  HR: 77  BP: 102/70 mmHg  _____________________________________________________________________________  __        Procedure  Complete  Portable Echo Adult. Contrast Lumason.  _____________________________________________________________________________  __        Interpretation Summary     Left ventricular systolic function is moderately reduced.  The visual ejection fraction is estimated at 35-40%.  There is moderate global hypokinesia of the left ventricle.  Septal and inferior segments are more hypokinetic.  There is moderate to severe eccentric left ventricular hypertrophy.  The study was technically difficult. There is no comparison study available.  _____________________________________________________________________________  __        Left Ventricle  The left ventricle is mildly dilated. There is moderate to severe eccentric  left ventricular hypertrophy. Left ventricular systolic function is moderately  reduced. The visual ejection fraction is estimated at 35-40%. There is  moderate global hypokinesia of the left ventricle. Septal and inferior  segments are more hypokinetic.     Right Ventricle  The right ventricle is normal size. Mildly decreased right ventricular  systolic function.     Atria  Normal left atrial size. Right atrial size is normal.     Mitral Valve  The mitral valve leaflets are mildly thickened. There is no mitral  regurgitation noted.        Tricuspid Valve  There is trace tricuspid regurgitation. The right ventricular systolic  pressure is approximated at 20.1 mmHg plus the right atrial pressure. Dilated  IVC (>2.5cm) with no respiratory collapse; right atrial pressure is estimated  at >20mmHg. Right ventricular systolic pressure is elevated, consistent with  mild pulmonary hypertension.     Aortic Valve  There is mild trileaflet aortic sclerosis. No aortic stenosis is present.     Pulmonic Valve  The pulmonic valve is not well visualized.     Vessels  Borderline aortic root dilatation.     Pericardium  There is no pericardial effusion.        Rhythm  Sinus rhythm was  noted.  _____________________________________________________________________________  __  MMode/2D Measurements & Calculations  IVSd: 1.2 cm     LVIDd: 5.8 cm  LVIDs: 5.0 cm  LVPWd: 1.1 cm  FS: 13.0 %  EDV(Teich): 166.1 ml  ESV(Teich): 120.6 ml  LV mass(C)d: 280.6 grams  LV mass(C)dI: 150.2 grams/m2  Ao root diam: 3.8 cm  LA dimension: 3.5 cm  asc Aorta Diam: 3.5 cm  LA/Ao: 0.92  LA Volume (BP): 46.6 ml  LA Volume Index (BP): 24.9 ml/m2  RWT: 0.38           Doppler Measurements & Calculations  MV E max kezia: 52.0 cm/sec  MV A max kezia: 63.7 cm/sec  MV E/A: 0.82  MV dec time: 0.23 sec  TR max kezia: 224.2 cm/sec  TR max P.1 mmHg  E/E' av.7  Lateral E/e': 6.3  Medial E/e': 9.2           _____________________________________________________________________________  __           Report approved by: Aiden Glez 2018 05:01 PM       1    Type Source Collected On     18 1550          View Image (below)              Encounter-Level Documents:     There are no encounter-level documents.      Order-Level Documents:     There are no order-level documents.

## 2018-01-23 NOTE — PROGRESS NOTES
Patient remains on full ventilatory support.    Ventilation Mode: CMV/AC  FiO2 (%): 40 %  Rate Set (breaths/minute): 14 breaths/min  Tidal Volume Set (mL): 550 mL  PEEP (cm H2O): 5 cmH2O  Oxygen Concentration (%): 40 %  Resp: 27     PS not attempted today.  Suctioning moderate amount of sputum from ETT.    Will continue to follow.

## 2018-01-23 NOTE — CONSULTS
NEUROCRITICAL CARE CONSULT NOTE    Reason for Consult:  Clonus of lower extremities        Consult requested by:  Reshma     Consult Team:  Neuro-Critical Care    Patient Name:  Joni Muñoz       Date of Admission:  1/22/2018       Problem List    Active Hospital Problems    Encephalopathy acute    Stroke Risk Factors:  Cocaine use, tobacco use    Past Medical History   Past Medical History:   Diagnosis Date     Allergic rhinitis, cause unspecified      Back pain, chronic      Depressive disorder      Scoliosis (and kyphoscoliosis), idiopathic      Substance abuse      Tobacco use disorder        Past Surgical History   Past Surgical History:   Procedure Laterality Date     BACK SURGERY       HERNIA REPAIR  1969     ORTHOPEDIC SURGERY      L arm        Family History   Family History   Problem Relation Age of Onset     Unknown/Adopted Mother      CEREBROVASCULAR DISEASE Father      DIABETES No family hx of      Coronary Artery Disease No family hx of      Hypertension No family hx of      Hyperlipidemia No family hx of      Breast Cancer No family hx of      Colon Cancer No family hx of      Prostate Cancer No family hx of      Other Cancer No family hx of      Depression No family hx of      Anxiety Disorder No family hx of      MENTAL ILLNESS No family hx of      Substance Abuse No family hx of      Anesthesia Reaction No family hx of      Asthma No family hx of      OSTEOPOROSIS No family hx of      Genetic Disorder No family hx of      Thyroid Disease No family hx of      Obesity No family hx of        Social History   Social History     Social History     Marital status: Single     Spouse name: N/A     Number of children: N/A     Years of education: N/A     Occupational History     Not on file.     Social History Main Topics     Smoking status: Current Every Day Smoker     Packs/day: 0.50     Years: 30.00     Types: Cigarettes     Smokeless tobacco: Current User     Alcohol use No     Drug use: No      Sexual activity: Yes     Partners: Female     Other Topics Concern     Parent/Sibling W/ Cabg, Mi Or Angioplasty Before 65f 55m? Yes      Service No     Blood Transfusions No     Caffeine Concern No     Occupational Exposure No     Hobby Hazards No     Sleep Concern No     Stress Concern No     Weight Concern No     Special Diet No     Back Care Yes     Exercise Yes     Bike Helmet No     Seat Belt Yes     Self-Exams No     Social History Narrative       Allergies   Allergies   Allergen Reactions     Acetaminophen Nausea          Brief summary of hospital stay to date:    68yo man presenting to the hospital from home after being found minimally responsive in the basement of his sister's house where he lives.  He was last seen the day prior to admission sleeping down the basement and minimally responsive.  When checked on again on the day of admission in the morning he was again minimally responsive and they contacted 911.  While being transported to this facility via EMS he was noted that his oxygen saturation was in the 70s, and with minimal response to noxious stimuli.  Of note he was diagnosed with bronchitis on 13 January with a recorded fluid exposure for which she was given Tamiflu and azithromycin with a plan to follow-up in 4 weeks.  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  He was intubated upon arrival to the emergency room and remains intubated today.  He was sedated on propofol up until this morning when the propofol was finally held.  With the propofol off he was noted to have clonus in bilateral lower extremities at the ankles.  His mental status is such that he is not able to follow commands, but responds to auditory stimuli in that he will look in the general direction of the sound.    Present Medications    oxymetazoline  2 spray Both Nostrils BID     chlorhexidine  15 mL Mouth/Throat Q12H     insulin aspart  1-4 Units Subcutaneous Q4H     ipratropium - albuterol 0.5  "mg/2.5 mg/3 mL  3 mL Nebulization Q4H     pantoprazole (PROTONIX) IV PEDS/NICU  40 mg Intravenous Daily     enoxaparin  40 mg Subcutaneous Q24H     cefTRIAXone  2 g Intravenous Q24H     azithromycin  250 mg Intravenous Q24H       NaCl 150 mL/hr at 18 0810     propofol (DIPRIVAN) infusion Stopped (18 0955)       EXAMINATION    Vital Signs  BP (!) 167/99  Temp 99.7  F (37.6  C)  Resp 27  Ht 1.778 m (5' 10\")  Wt 74.7 kg (164 lb 10.9 oz)  SpO2 96%  BMI 23.63 kg/m2    Tmax: Temp (24hrs), Av.2  F (37.9  C), Min:98.1  F (36.7  C), Max:101.1  F (38.4  C)      Intake/Output:   Intake/Output Summary (Last 24 hours) at 18 1455  Last data filed at 18 1400   Gross per 24 hour   Intake             3787 ml   Output             2455 ml   Net             1332 ml       General Examination   General State of Health: healthy looking, not in distress, not in pain  Level of Alertness: awake, lying in bed off propofol, not following commands  Orientation: does not respond to choices about orientation    Nutrition   Active Diet Order      NPO for Medical/Clinical Reasons Except for: Meds, NPO but receiving Tube Feeding    NeuroCritical Neurologic Examination         Cranial Nerves:  Pupils 5 mm, reactive. EOMI. corneal reflex present, cough and gag present           Motor: Appears to be full strength throughout with increased tone of the right lower extremity and normal bulk         Reflexes: Diffusely hyperreflexic with 3+ pectoralis reflex as well as 3+ reflexes throughout except for at the ankle which are 4+ with sustained clonus.  He has Supa sign bilaterally.  He has a questionable palmomental reflex on the left.  Toes are mute bilaterally to Babinski and Oppenheim       Sensory: Intact to noxious stimuli bilaterally              Coordination:   Unable to assess due to patient mental status       Gait: Unable to assess due to patient's medical condition      Cardiovascular:  RRR  Pulmonary:  on " mechanical ventilation  Abdominal:  soft, non-tender, non-distended  Genitourinary: Adequate urine output in last 24 hours  Extremities:  no edema    Laboratory Findings    Data     CMP   Recent Labs  Lab 01/23/18  0610 01/22/18  1310 01/22/18  0848   * 144 140   POTASSIUM 3.4 5.4* 4.4   CHLORIDE 114* 110* 104   CO2 25 23 26   ANIONGAP 8 11 10   GLC 89 100* 92   BUN 24 32* 34*   CR 0.89 1.78*  Canceled, Test credited 1.94*   GFRESTIMATED 86 38*  Canceled, Test credited 35*   GFRESTBLACK >90 46*  Canceled, Test credited 42*   HEATH 7.4* 7.3* 8.2*   PROTTOTAL 6.0* 6.5* 8.0   ALBUMIN 2.2* 2.5* 3.3*   BILITOTAL 0.4 0.6 0.8   ALKPHOS 100 115 153*   * 749* 1225*   * 708* 940*        CBC   Recent Labs  Lab 01/23/18  0610 01/22/18  1310 01/22/18  0848   WBC 20.1* 18.2* 19.0*   RBC 4.39* 4.73 5.38   HGB 13.7 15.0 17.1   HCT 39.5* 43.3 49.3   MCV 90 92 92   MCH 31.2 31.7 31.8   MCHC 34.7 34.6 34.7   RDW 15.5* 15.2* 15.4*    276  Canceled, Test credited 283       INR, PTT   Recent Labs  Lab 01/23/18  0610 01/22/18  0848   INR 1.42* 1.08        Arterial Blood Gas   Recent Labs  Lab 01/22/18  1450 01/22/18  1020   PH 7.40 7.27*   PCO2 39 48*   PO2 85 210*   HCO3 24 22   O2PER 40% VENT 80%       UA    Recent Labs  Lab 01/22/18  1015   COLOR Yellow   APPEARANCE Clear   URINEGLC Negative   URINEBILI Negative   URINEKETONE Negative   SG 1.010   UBLD Negative   URINEPH 6.5   PROTEIN Negative   NITRITE Negative   LEUKEST Negative   RBCU <1   WBCU <1       Micro   Recent Labs  Lab 01/22/18  1548  01/22/18  1310  01/22/18  1015   SDES Nares  < > Blood Left Arm  < > Catheterized Urine   SREQ  --   --   --   --  Specimen received in preservative   CULT  --   --  No growth after 10 hours  < > No growth   < > = values in this interval not displayed.       Radiological Data  Data   Recent Results (from the past 48 hour(s))   XR Chest Port 1 View    Narrative    XR CHEST PORT 1 VW 1/22/2018 8:57 AM     HISTORY:  decreasd loc;       Impression    IMPRESSION: Linear atelectasis or fibrosis at the left lung base.  Additional mild hazy opacity of the left lung base posterior to the  heart could represent pneumonia or aspiration.    LULÚ KAISER MD   XR Chest Port 1 View    Narrative    XR CHEST PORT 1 VW 1/22/2018 9:18 AM    HISTORY: Decreased level of consciousness.    COMPARISON: January 22, 2018.      Impression    IMPRESSION: An endotracheal tube terminates 8.7 cm above the joyce.  There is patchy opacification of the left lung base, worsened compared  to prior exam. No pneumothorax.    BRANNON MONTIEL MD   Head CT w/o contrast    Narrative    CT SCAN OF THE HEAD WITHOUT CONTRAST   1/22/2018 9:35 AM     HISTORY: Altered mental status.    TECHNIQUE:  Axial images of the head and coronal reformations without  IV contrast material.  Radiation dose for this scan was reduced using  automated exposure control, adjustment of the mA and/or kV according  to patient size, or iterative reconstruction technique.    COMPARISON: None.    FINDINGS: There is an old infarct in the right basal ganglia region  and a small cystic area in the genu of the corpus callosum which is  also probably an old infarct. There is also a tiny old right anterior  thalamic lacunar infarct. Mild cerebral atrophy and some minimal  patchy white matter changes are present in both hemispheres. There is  no evidence for intracranial hemorrhage, mass effect, acute infarct,  or skull fracture. Extensive paranasal sinus disease is seen with  air-fluid levels. Some vascular calcifications also noted.      Impression    IMPRESSION:  1. Chronic intraparenchymal brain changes. No evidence for any acute  intracranial process.  2. Extensive paranasal sinus disease with fluid levels. An acute  sinusitis could be present. Clinical correlation suggested.    EMERITA MOORE MD   Chest  XR, 1 view PORTABLE    Narrative    XR CHEST PORT 1 VW 1/22/2018 10:01 AM    HISTORY:  Endotracheal tube placement.    COMPARISON: January 22, 2018.      Impression    IMPRESSION: The endotracheal tube terminates 6.5 cm from the joyce.  Nasogastric tube extends into the stomach beyond the lower margin the  study. Patchy opacification of left lung base as before. No  pneumothorax.    BRANNON MONTIEL MD          ASSESSMENT AND PLAN  67-year-old presented with diffuse encephalopathy of unclear origin at this point in time.  He does have an infectious process going on with respect to his known bronchitis and exposure to flu.  His procalcitonin is markedly elevated suggesting some sort of a pro-inflammatory state.  Mental status today seems to be improved from what is described upon initial presentation.  He looks around the room though does not respond to command.  He appears stuporous at this time.  Diffuse hyperreflexia is interesting involved he has frontal release signs as well as global hyperreflexia.  He does have some known spinal disease though this is at the lumbar level.  The presence of frontal release signs with associated global hyperreflexia may point to a central origin, but the palmomental reflex was very very minimal.  I suspect there is pathology within the cervical spine given this examination and would recommend a cervical MRI while the brain MRI is being obtained by the primary team.    Plan:  1.  Obtain cervical spine MRI while down for brain MRI  2.  Continue with supportive cares for encephalopathy as he appears to be improving, suggest toxic metabolic origin versus hypoxic source      Thank you for involving the Neuro-Critical Care Team in the care of this patient.  We will continue to follow.  Please do not hesitate to contact us for any further questions.    Denny Padilla MD  Vascular Neurology/Neurocritical Care  Text Page - [phone]

## 2018-01-23 NOTE — PLAN OF CARE
Problem: Patient Care Overview  Goal: Plan of Care/Patient Progress Review  OT and PT: Spoke with nursing, pt intubated and off sedation and not following, not appropriate for therapy today.

## 2018-01-23 NOTE — PROGRESS NOTES
Propofol increased for moderate sedation as pt. Has to not have movement for MRI to be accurate and Fentanyl 100 given as pt has to lay flat in Mri and given to keep pt. Comfortable.

## 2018-01-23 NOTE — PROGRESS NOTES
Critical Care Progress Note      01/23/2018    Name: Joni Muñoz MRN#: [id number]   Age: 67 year old YOB: 1950     Hsptl Day# 1  ICU DAY #1    MV DAY #1             Problem List:   Active Problems:    Encephalopathy acute  Severe sepsis  Community acquired pneumonia  Acute hypoxemic/hypercarbic respiratory failure  Elevated LFTs  ECHO EF 35-40% with moderate global LV hypokinesis          Summary/Hospital Course:   67 year old male with a history of substance abuse (methadone- rx and cocaine recreationally) who presents to the emergency department today via EMS for evaluation of altered mental status and hypoxia. The patient was seen in clinic on January 13th and was diagnosed with bronchitis, with followup scheduled in 4 weeks with reported exposure to flu and rx for tamiflu and zpak. Per EMS, Mr. Muñoz was checked on by family yesterday and found to be minimally responsive, so they assumed he was sleeping.  When checked on by family again this morning, he was still minimally responsive so they became concerned and contacted 911. En route, his sats were in the 70s, blood sugar at 96, and sounded congested with wheezing. He was unresponsive with minimal moans to pain. At arrival, he remained unresponsive and was put on 10 L/min of oxygen. He is a methadone clinic patient and has not gone in for the past couple of days.     In ED patient was intubated upon arrival secondary to obtundation and inability to protect airway. ABG showed a acute hypercarbia. LA 2.6 with elevated blood pressures. Labs otherwise noteable for ANTONI with Cr 1.9, shock liver with AST ALT >1000 , WBC 19K. Head ct without acute abnormality and CXR possible LL infiltrate. Patient was bolused 30 ml/kg IVfluid and dosed antibiotics over concern for sepsis and admitted to ICU for further cares.      On initial evaluation in the ICU, Mr. Muñoz is sedated and intubated. I spoke with the patient's niece, who stated the patient has  overdosed on his methadone a few times in the past. He used up 3-4 methadone vials within the past few days. She believes his methadone dose is somewhere between 90-120mg. Additionally, she states Mr. Muñoz drinks a significant amount of caffeine, up to 8 monster drinks a day as well uses cocaine.  She states that he has not been complaining of any chest pain, headache, or neck pain.          Interim History:   1/23 - No acute events overnight. 24hr Tmax 101.5 with gradual decrease to normal (99 this AM). Remains sedated (Propofol 20mcg//kg/min) and intubated on CMV/AC with settings 14/550/5/40, he is overbreathing vent with a rate of 23. Echo performed (1/22) notable for EF 35-40% with moderate global hypokinesia of the LV, and moderate to severe eccentric left ventricular hypertrophy.       Assessment and plan :     Joni Muñoz IS a 67 year old male with a history of substance abuse currently enrolled in a methadone clinic, tobacco use, and chronic back pain admitted on 1/22/2018 for acute encephalopathy and severe sepsis presumed 2/2 community acquired pneumonia.    I have personally reviewed the daily labs, imaging studies, cultures and discussed the case with referring physician and consulting physicians.     My assessment and plan by system for this patient is as follows:    Neurology/Psychiatry:   1. Acute encephalopathy - ddx toxic/metabolic vs infectious vs ingestion/overdose; less likely CVA due to no acute bleed on CT head; less likely meningitis given exam and history findings.   2. Substance abuse - on methadone program (h/o methadone OD), h/o cocaine use; ?overdose   3. CT head suggestive of prior CVAs (right basal ganglia and corpus callosum distribution)   Plan  - Sepsis workup (see ID, below); continue broad-spectrum Abx   - ?Consider MRI brain if encephalopathy fails to resolve   - Minimize propofol sedation     Cardiovascular:   1. Elevated troponin - ddx demand ischemic; no ST elevation on  EKG but Q-waves in leads V1-V2, aVL --> ?old septal infarct  2. ECHO EF 35-40% w/ moderate global hypokinesia of LV and eccentric LV hypertrophy  Plan  - Continue to trend troponin  - Serial lactates   - ?Consider cardiology consult for ECHO findings    Pulmonary/Ventilator Management:   1. Acute hypoxemic/hypercarbic respiratory failure - ddx recent URI w/ encephalopathy; good oxygenation./ventilation mechanics on current vent settings  2. Community acquired pneumonia - no h/o MDRO or respiratory failure; rx'd Z-bing and Tamiflu as outpatient --> ?taking  3. Heavy tobacco use - ?underlying obstructive lung disease (spirometry from 3/26/2015 FEV1/FVC 67)  4. CT suggestive of acute sinusitis   Plan  - Continue mechanical ventilation on current settings, mental status precludes extubation  - Continue empiric antibiotics  - Infectious workup pending (see ID)  - Serial ABGs; will adjust vent settings accordingly  - Albuterol and Duonebs ordered PRN for ?underlying obstructive disease 2/2 heavy tobacco use  - Afrin for 3 days acute sinusitis     GI and Nutrition :   1. Elevated LFTs >1000 - ddx shock liver versus toxic hepatitis (?ingestion/EtOH), low suspicion for underlying AI disease; acetaminophen negative  Plan  - Trend LFTs  - Lipase negative  - Consider RUQ ultrasound if LFTs worsen; w/u more indicative of hepatocellular injury as opposed to obstructive/cholestatic.     Renal/Fluids/Electrolytes:   1. Acute kidney injury - resolved; likely prerenal etiology, fluid resuscitated w/ Cr return to baseline and FENA 1%   2. Electrolyte abnormality - Hypernatremia (free water deficit 1.0L) and Pseudohypocalcemia (corrected Ca2+ 8.8 mg/dL) and hyperosmolality (calculated Osm 316; measured 311 - unlikely unmeasured substance)  3. Anion gap metabolic acidosis - lactate down-trend; ?NAGMA given low AG, delta/delta ~1  4. Volume depletion - resolved  Plan  - monitor function and electrolytes as needed with replacement per ICU  protocols.  - generally avoid nephrotoxic agents such as NSAID, IV contrast unless specifically required  - adjust medications as needed for renal clearance  - follow I/O's as appropriate.    Infectious Disease:   1. Severe sepsis - ddx community acquired pneumonia; less likely urosepsis (UA normal), meningitis (exam and history unremarkable), endocarditis (no vegetation seen on ECHO and no h/o IV drug use)  - BCx2 no growth as of this morning   - UCx collected; pending   - Sputum culture/Gram stain pending   - Resp virus panel pending   - Legionella urine Ag pending   - Influenza A/B Ag negative   - MRSA nasal swab negative  Plan  - Continue Azithromycin and Ceftriaxone  - fup cultures      Endocrine:   1. Stress induced hyperglycemia  Plan  - ICU insulin protocol, goal sugar <180    Hematology/Oncology:   1. Leukocytosis - likely reactive  Plan  - Follow CBCs    MSKL/Rheum:  No active problems     IV/Access:   1. Venous access - R ACF and L ACF (1/22)  2. Arterial access - not indicate at present  Plan  - central access and arterial are not yet required or necessary    ICU Prophylaxis:   1. DVT: Hep Subq  2. VAP: HOB 30 degrees, chlorhexidine rinse  3. Stress Ulcer: PPI  4. Restraints: Nonviolent soft two point restraints required and necessary for patient safety and continued cares and good effect as patient continues to pull at necessary lines, tubes despite education and distraction. Will readdress daily.   5. Wound care - per unit routine   6. Feeding - NPO  7. Family Update: pending  8. Disposition - ICU     Key goals for next 24 hours:   1. Trial off propofol to better assess neurologic status. If evolving neurologic changes, will get brain MRI to assess for anoxic brain injury.   2. Pressure support trial off propofol.          Key Medications:       chlorhexidine  15 mL Mouth/Throat Q12H     insulin aspart  1-4 Units Subcutaneous Q4H     ipratropium - albuterol 0.5 mg/2.5 mg/3 mL  3 mL Nebulization Q4H      pantoprazole (PROTONIX) IV PEDS/NICU  40 mg Intravenous Daily     enoxaparin  40 mg Subcutaneous Q24H     cefTRIAXone  2 g Intravenous Q24H     azithromycin  250 mg Intravenous Q24H       NaCl 150 mL/hr at 01/23/18 0058     propofol (DIPRIVAN) infusion 20 mcg/kg/min (01/22/18 2334)               Physical Examination:   Temp:  [98.1  F (36.7  C)-101.5  F (38.6  C)] 99.1  F (37.3  C)  Heart Rate:  [] 81  Resp:  [11-41] 18  BP: ()/() 124/75  FiO2 (%):  [40 %-100 %] 40 %  SpO2:  [89 %-100 %] 95 %    Intake/Output Summary (Last 24 hours) at 01/23/18 0738  Last data filed at 01/23/18 0600   Gross per 24 hour   Intake           3625.7 ml   Output             2410 ml   Net           1215.7 ml     Wt Readings from Last 4 Encounters:   01/23/18 74.7 kg (164 lb 10.9 oz)   01/13/18 74.6 kg (164 lb 8 oz)   09/28/17 73.5 kg (162 lb)   09/21/17 73.9 kg (163 lb)     BP - Mean:  [] 90  Ventilation Mode: CMV/AC  FiO2 (%): 40 %  Rate Set (breaths/minute): 14 breaths/min  Tidal Volume Set (mL): 550 mL  PEEP (cm H2O): 5 cmH2O  Oxygen Concentration (%): 40 %  Resp: 18    Recent Labs  Lab 01/22/18  1450 01/22/18  1020   PH 7.40 7.27*   PCO2 39 48*   PO2 85 210*   HCO3 24 22   O2PER 40% VENT 80%       GEN: Adult male lying supine in bed, chewing on ET tube ?purposeful, does not tract with eyes. Sedated with propofol.   HEENT: head ncat, pupils 1.5mm symmetric, sclera anicteric, OP patent with ETT in place, trachea midline   PULM: unlabored synchronous with vent, clear anteriorly    CV/COR: RRR S1S2 no gallop,  No rub, no murmur  ABD: soft nontender, hypoactive bowel sounds, no mass  EXT:  No edema, feet cool to touch but 2+ DP bilaterally  NEURO: ? responsive to voice not to painful stimuli,  Does not blink to threat, clonus with ankle jerk, 2+ DTRs bilaterally.   SKIN: no obvious rash  LINES: clean, dry intact         Data:   All data and imaging reviewed     ROUTINE ICU LABS (Last four results)  CMP  Recent  Labs  Lab 01/23/18  0610 01/22/18  1310 01/22/18  0848   * 144 140   POTASSIUM 3.4 5.4* 4.4   CHLORIDE 114* 110* 104   CO2 25 23 26   ANIONGAP 8 11 10   GLC 89 100* 92   BUN 24 32* 34*   CR 0.89 1.78*  Canceled, Test credited 1.94*   GFRESTIMATED 86 38*  Canceled, Test credited 35*   GFRESTBLACK >90 46*  Canceled, Test credited 42*   HEATH 7.4* 7.3* 8.2*   PROTTOTAL 6.0* 6.5* 8.0   ALBUMIN 2.2* 2.5* 3.3*   BILITOTAL 0.4 0.6 0.8   ALKPHOS 100 115 153*   * 749* 1225*   * 708* 940*     CBC  Recent Labs  Lab 01/23/18  0610 01/22/18  1310 01/22/18  0848   WBC 20.1* 18.2* 19.0*   RBC 4.39* 4.73 5.38   HGB 13.7 15.0 17.1   HCT 39.5* 43.3 49.3   MCV 90 92 92   MCH 31.2 31.7 31.8   MCHC 34.7 34.6 34.7   RDW 15.5* 15.2* 15.4*    276  Canceled, Test credited 283     INR  Recent Labs  Lab 01/23/18  0610 01/22/18  0848   INR 1.42* 1.08     Arterial Blood Gas  Recent Labs  Lab 01/22/18  1450 01/22/18  1020   PH 7.40 7.27*   PCO2 39 48*   PO2 85 210*   HCO3 24 22   O2PER 40% VENT 80%       All cultures:    Recent Labs  Lab 01/22/18  1310 01/22/18  1255 01/22/18  1015 01/22/18  0952 01/22/18  0910 01/22/18  0848   CULT No growth after 10 hours No growth after 10 hours PENDING No growth after 18 hours PENDING No growth after 18 hours     Recent Results (from the past 24 hour(s))   XR Chest Port 1 View    Narrative    XR CHEST PORT 1 VW 1/22/2018 8:57 AM     HISTORY: decreasd loc;       Impression    IMPRESSION: Linear atelectasis or fibrosis at the left lung base.  Additional mild hazy opacity of the left lung base posterior to the  heart could represent pneumonia or aspiration.    LULÚ KAISER MD   XR Chest Port 1 View    Narrative    XR CHEST PORT 1 VW 1/22/2018 9:18 AM    HISTORY: Decreased level of consciousness.    COMPARISON: January 22, 2018.      Impression    IMPRESSION: An endotracheal tube terminates 8.7 cm above the joyce.  There is patchy opacification of the left lung base, worsened  compared  to prior exam. No pneumothorax.    BRANNON MONTIEL MD   Head CT w/o contrast    Narrative    CT SCAN OF THE HEAD WITHOUT CONTRAST   1/22/2018 9:35 AM     HISTORY: Altered mental status.    TECHNIQUE:  Axial images of the head and coronal reformations without  IV contrast material.  Radiation dose for this scan was reduced using  automated exposure control, adjustment of the mA and/or kV according  to patient size, or iterative reconstruction technique.    COMPARISON: None.    FINDINGS: There is an old infarct in the right basal ganglia region  and a small cystic area in the genu of the corpus callosum which is  also probably an old infarct. There is also a tiny old right anterior  thalamic lacunar infarct. Mild cerebral atrophy and some minimal  patchy white matter changes are present in both hemispheres. There is  no evidence for intracranial hemorrhage, mass effect, acute infarct,  or skull fracture. Extensive paranasal sinus disease is seen with  air-fluid levels. Some vascular calcifications also noted.      Impression    IMPRESSION:  1. Chronic intraparenchymal brain changes. No evidence for any acute  intracranial process.  2. Extensive paranasal sinus disease with fluid levels. An acute  sinusitis could be present. Clinical correlation suggested.    EMERITA MOORE MD   Chest  XR, 1 view PORTABLE    Narrative    XR CHEST PORT 1 VW 1/22/2018 10:01 AM    HISTORY: Endotracheal tube placement.    COMPARISON: January 22, 2018.      Impression    IMPRESSION: The endotracheal tube terminates 6.5 cm from the joyce.  Nasogastric tube extends into the stomach beyond the lower margin the  study. Patchy opacification of left lung base as before. No  pneumothorax.    BRANNON MONTIEL MD       ECHO  Interpretation Summary  -Left ventricular systolic function is moderately reduced.  -The visual ejection fraction is estimated at 35-40%.  -There is moderate global hypokinesia of the left ventricle.  -Septal and inferior  segments are more hypokinetic.  -There is moderate to severe eccentric left ventricular hypertrophy.  -The study was technically difficult. There is no comparison study available.  Interpretation per Dr. Tay.         Billing: This patient is critically ill: Yes. Total critical care time today  40 min.    DEVONTE Hartman MS4 am serving as scribe for Dr. Justice.

## 2018-01-23 NOTE — PLAN OF CARE
Problem: Individualization  Goal: Patient Individualization  Outcome: No Change  Patient arrived from ED at 1145, remains sedated, on small dose of propofol. Off sedation patient only responds to pain stimuli. Vital signs stable. Patient's niece at bedside for visit and update.

## 2018-01-24 NOTE — PLAN OF CARE
Problem: Patient Care Overview  Goal: Plan of Care/Patient Progress Review  Outcome: No Change  Appears to be the presence of a U wave on telemetry. Will check a phos, mg, and K. Otherwise patient remains unchanged. Opens eyes to voice, withdraws to pain. Does not attempt to follow commands or respond to questions. Dr. Urbina notified of SBP >180 and PRN labetalol ordered.

## 2018-01-24 NOTE — PLAN OF CARE
Problem: Patient Care Overview  Goal: Plan of Care/Patient Progress Review  OT and PT: Per discussion with nurse, patient not appropriate for therapy today. Intubated and not following commands.

## 2018-01-24 NOTE — PROGRESS NOTES
Notified of low grade fever.  Has been ongoing off and on for ~24 hours on chart review.  On CTX.  Cultures sent last night.  So far negative.  AST/ALT elevated but markedly improved.  Will order tylenol for fever.  CTM.

## 2018-01-24 NOTE — PLAN OF CARE
Problem: Ventilation, Mechanical Invasive (Adult)  Goal: Signs and Symptoms of Listed Potential Problems Will be Absent, Minimized or Managed (Ventilation, Mechanical Invasive)  Signs and symptoms of listed potential problems will be absent, minimized or managed by discharge/transition of care (reference Ventilation, Mechanical Invasive (Adult) CPG).   Tolerating mechanical ventilation with propofol gtt infusing. Cough induced with inline suctioning and large return of tan thick pink tinged secretions approx every two hours. VSS. Herman patent with UOP QS. Sin intact. intermittent muscle twitch but able to stimulate cough and grimacing during twitching, no pupil changes or significant hemodynamic changes, Clonus BLE. OGT in place (69 cm lip) bilaterally LIWS approx 400 mL out gastric fluid greenish brown in color. Continuing to monitor

## 2018-01-24 NOTE — PLAN OF CARE
Problem: Patient Care Overview  Goal: Plan of Care/Patient Progress Review  Outcome: No Change  Neuro: PERRLA.  Unable to follow commands.   CV:  SR on the monitor.  BP elevated.   Pulmonary: Remains intubated. Lungs diminished.   GI/: Urine output adequate.   Skin: No major skin issues   Drips: Propofol   Update:

## 2018-01-24 NOTE — PLAN OF CARE
Problem: Ventilation, Mechanical Invasive (Adult)  Goal: Signs and Symptoms of Listed Potential Problems Will be Absent, Minimized or Managed (Ventilation, Mechanical Invasive)  Signs and symptoms of listed potential problems will be absent, minimized or managed by discharge/transition of care (reference Ventilation, Mechanical Invasive (Adult) CPG).   Outcome: No Change  Off propofol from . Opens eyes spontaneously, looks towards stimuli but is not following commands. Clonus present bilaterally. PERRL 4-3. Turned sedation back on due to increasing agitation and restlessness. Vitals stable other than hypertensive while off sedation. Lungs clear, tolerating vent. Moderate thick, creamy yellowish/tan secretions from ETT. NG tube in place, NPO. Family updated over the phone.

## 2018-01-24 NOTE — PROGRESS NOTES
Critical Care Progress Note      01/24/2018    Name: Joni Muñoz MRN#: [id number]   Age: 67 year old YOB: 1950     Hsptl Day# 2  ICU DAY #2    MV DAY #2             Problem List:   Active Problems:    Encephalopathy acute  Severe sepsis  Community acquired pneumonia  Acute hypoxemic/hypercarbic respiratory failure  Elevated LFTs - resolving  Cardiomyopathy         Summary/Hospital Course:   67 year old male with a history of substance abuse (methadone- rx and cocaine recreationally) who presents to the emergency department today via EMS for evaluation of altered mental status and hypoxia. The patient was seen in clinic on January 13th and was diagnosed with bronchitis, with followup scheduled in 4 weeks with reported exposure to flu and rx for tamiflu and zpak. Per EMS, Mr. Muñoz was checked on by family yesterday and found to be minimally responsive, so they assumed he was sleeping.  When checked on by family again this morning, he was still minimally responsive so they became concerned and contacted 911. En route, his sats were in the 70s, blood sugar at 96, and sounded congested with wheezing. He was unresponsive with minimal moans to pain. At arrival, he remained unresponsive and was put on 10 L/min of oxygen. He is a methadone clinic patient and has not gone in for the past couple of days.     In ED patient was intubated upon arrival secondary to obtundation and inability to protect airway. ABG showed a acute hypercarbia. LA 2.6 with elevated blood pressures. Labs otherwise noteable for ANTONI with Cr 1.9, shock liver with AST ALT >1000 , WBC 19K. Head ct without acute abnormality and CXR possible LL infiltrate. Patient was bolused 30 ml/kg IVfluid and dosed antibiotics over concern for sepsis and admitted to ICU for further cares.      On initial evaluation in the ICU, Mr. Muñoz is sedated and intubated. I spoke with the patient's niece, who stated the patient has overdosed on his methadone a  few times in the past. He used up 3-4 methadone vials within the past few days. She believes his methadone dose is somewhere between 90-120mg. Additionally, she states Mr. Muñoz drinks a significant amount of caffeine, up to 8 monster drinks a day as well uses cocaine.  She states that he has not been complaining of any chest pain, headache, or neck pain.     1/23 - obtained MRI brain and C-spine for diffuse hyperreflexia and sustained clonus in ankle. NCC consulted as well. Patient's niece, Nataliia, plans on coming in tomorrow morning; she and her mother (patient's sister) were updated on Mr. Muñoz' condition over the phone.           Interim History:   Sustained hypertension overnight and given PRN Labetalol. Otherwise, vitals stable. Remains sedated on propofol drip. No analgesia currently.     MRI with evidence of anoxic injury, not catastrophic         Assessment and plan :     Joni Muñoz is a 67 year old male with a history of substance abuse currently enrolled in a methadone clinic, tobacco use, and chronic back pain admitted on 1/22/2018 for acute encephalopathy and severe sepsis presumed 2/2 community acquired pneumonia.    I have personally reviewed the daily labs, imaging studies, cultures and discussed the case with referring physician and consulting physicians.     My assessment and plan by system for this patient is as follows:    Neurology/Psychiatry:   1. Acute encephalopathy - SHANELL imaging concerning for ischemic/anoxic injury - significant and impairment unclear .   2. Substance abuse - on methadone program (h/o methadone OD), h/o cocaine use; ?overdose   3. CT head suggestive of prior CVAs (right basal ganglia and corpus callosum distribution) - confirmed on MRI  Plan  - FUP with Neuro CC - ? Prognosis - will update family  - minimize sedation, wean propofol to off  - goal RASS - 0 - consider precedex or prns   - supportive cares   - restart on methadone (1/2 home dose)    Cardiovascular:   1.  NSTEMI type 2  Q-waves in leads V1-V2, aVL --> ?septal infarct, in setting of cocaine use  2. Cardiomyopathy - ECHO EF 35-40% w/ moderate global hypokinesia of LV and eccentric LV hypertrophy  3. HTN- suspect chronic exacerbated by acute illness/withdrawal   Plan  - start coreg 6.125  - prn hydralazine for SBP >180    Pulmonary/Ventilator Management:   1. Acute hypoxemic/hypercarbic respiratory failure - ddx recent URI w/ encephalopathy; good oxygenation./ventilation mechanics on current vent settings  2. Community acquired pneumonia - no h/o MDRO or respiratory failure; rx'd Z-bing and Tamiflu as outpatient --> stable  3. Heavy tobacco use - ?underlying obstructive lung disease (spirometry from 3/26/2015 FEV1/FVC 67)  4. CT suggestive of acute sinusitis   Plan  - continue mechanical ventilation on current settings,   - Continue empiric antibiotics  - Start PST, mental status precludes extubation  - Albuterol and Duonebs ordered PRN for ?underlying obstructive disease 2/2 heavy tobacco use  - Afrin for 1/3 days acute sinusitis     GI and Nutrition :   1. Shock liver ( Elevated LFTs >1000)  - ddx shock liver versus toxic hepatitis (?ingestion/EtOH), low suspicion for underlying AI disease; acetaminophen negative - improved   2. NPO  Plan  - serial LFTs  - Start enteral feedings     Renal/Fluids/Electrolytes:   1. Acute kidney injury - resolved; likely prerenal etiology, fluid resuscitated w/ Cr return to baseline and FENA 1%   2. Electrolyte abnormality - HyperNa (free water deficit 1.0L), kypoK  and Pseudohypocalcemia (corrected Ca2+ 8.8 mg/dL) and hyperosmolality (calculated Osm 316; measured 311 - unlikely unmeasured substance)  3. Anion gap metabolic acidosis - lactate down-trend; ?NAGMA given low AG, delta/delta ~1 - resolved   4. Volume depletion - resolved  Plan  - monitor function and electrolytes as needed with replacement per ICU protocols.  - generally avoid nephrotoxic agents such as NSAID, IV contrast  unless specifically required  - adjust medications as needed for renal clearance  - follow I/O's as appropriate.    Infectious Disease:   1. Severe sepsis - ddx community acquired pneumonia; less likely urosepsis (UA normal), meningitis (exam and history unremarkable), endocarditis (no vegetation seen on ECHO and no h/o IV drug use)  - BCx2 no growth as of this morning sputum with moraxella heavy growth S pending   Plan  - continue ceftriaxone ( complete 10 day empiric course)   - dc azithro (QTC)   - fup cultures      Endocrine:   1. Stress induced hyperglycemia  Plan  - ICU insulin protocol, goal sugar <180    Hematology/Oncology:   1. Leukocytosis - likely reactive  Plan  - Follow CBCs    MSKL/Rheum:  No active problems     IV/Access:   1. Venous access - R ACF and L ACF (1/22)  2. Arterial access - not indicate at present  Plan  - central access and arterial are not yet required or necessary    ICU Prophylaxis:   1. DVT: enoxaparin  2. VAP: HOB 30 degrees, chlorhexidine rinse  3. Stress Ulcer: PPI  4. Restraints: Nonviolent soft two point restraints required and necessary for patient safety and continued cares and good effect as patient continues to pull at necessary lines, tubes despite education and distraction. Will readdress daily.   5. Wound care - per unit routine   6. Feeding - Start enteral feeds   7. Family Update: niece at bedside   8. Disposition - ICU     Key goals for next 24 hours:   1.  Wean propofol   2. Even fluid status   3. FUP with Neuro   4. Replete electrolytes  5. Start enteral feeds  6. SBT for conditioning   7 low dose coreg           Key Medications:       oxymetazoline  2 spray Both Nostrils BID     chlorhexidine  15 mL Mouth/Throat Q12H     insulin aspart  1-4 Units Subcutaneous Q4H     ipratropium - albuterol 0.5 mg/2.5 mg/3 mL  3 mL Nebulization Q4H     pantoprazole (PROTONIX) IV PEDS/NICU  40 mg Intravenous Daily     enoxaparin  40 mg Subcutaneous Q24H     cefTRIAXone  2 g  Intravenous Q24H     azithromycin  250 mg Intravenous Q24H       NaCl 100 mL/hr at 01/24/18 0606     propofol (DIPRIVAN) infusion 40 mcg/kg/min (01/24/18 0807)               Physical Examination:   Temp:  [99.1  F (37.3  C)-100.4  F (38  C)] 99.3  F (37.4  C)  Heart Rate:  [71-98] 75  Resp:  [17-29] 22  BP: (140-187)/() 174/109  FiO2 (%):  [40 %-60 %] 40 %  SpO2:  [93 %-100 %] 100 %    Intake/Output Summary (Last 24 hours) at 01/23/18 0738  Last data filed at 01/23/18 0600   Gross per 24 hour   Intake           3625.7 ml   Output             2410 ml   Net           1215.7 ml     Wt Readings from Last 4 Encounters:   01/24/18 71.9 kg (158 lb 8.2 oz)   01/13/18 74.6 kg (164 lb 8 oz)   09/28/17 73.5 kg (162 lb)   09/21/17 73.9 kg (163 lb)     BP - Mean:  [113-141] 135  Ventilation Mode: CMV/AC  FiO2 (%): 40 %  Rate Set (breaths/minute): 14 breaths/min  Tidal Volume Set (mL): 550 mL  PEEP (cm H2O): 5 cmH2O  Oxygen Concentration (%): 40 %  Resp: 22    Recent Labs  Lab 01/24/18  0535 01/22/18  1450 01/22/18  1020   PH 7.45 7.40 7.27*   PCO2 37 39 48*   PO2 124* 85 210*   HCO3 26 24 22   O2PER 40 40% VENT 80%       GEN: Adult male lying supine in bed, chewing on ET tube ?purposeful, does not tract with eyes. Sedated with propofol.   HEENT: head ncat, pupils 1.5mm symmetric, sclera anicteric, OP patent with ETT in place, trachea midline   PULM: unlabored synchronous with vent, clear anteriorly    CV/COR: RRR S1S2 no gallop,  No rub, no murmur  ABD: soft nontender, hypoactive bowel sounds, no mass  EXT:  No edema, feet cool to touch but 2+ DP bilaterally  NEURO: ? responsive to voice not to painful stimuli,  Does not blink to threat, clonus with ankle jerk, 2+ DTRs bilaterally.   SKIN: no obvious rash  LINES: clean, dry intact         Data:   All data and imaging reviewed     ROUTINE ICU LABS (Last four results)  CMP    Recent Labs  Lab 01/24/18  0612 01/23/18  2327 01/23/18  0610 01/22/18  1310 01/22/18  0848      --  147* 144 140   POTASSIUM 3.6 2.9* 3.4 5.4* 4.4   CHLORIDE 109  --  114* 110* 104   CO2 26  --  25 23 26   ANIONGAP 6  --  8 11 10   *  --  89 100* 92   BUN 11  --  24 32* 34*   CR 0.53*  --  0.89 1.78*  Canceled, Test credited 1.94*   GFRESTIMATED >90  --  86 38*  Canceled, Test credited 35*   GFRESTBLACK >90  --  >90 46*  Canceled, Test credited 42*   HEATH 8.1*  --  7.4* 7.3* 8.2*   MAG  --  2.3  --   --   --    PHOS  --  1.7*  --   --   --    PROTTOTAL 6.6*  --  6.0* 6.5* 8.0   ALBUMIN 2.3*  --  2.2* 2.5* 3.3*   BILITOTAL 0.5  --  0.4 0.6 0.8   ALKPHOS 114  --  100 115 153*   AST 98*  --  245* 749* 1225*   *  --  515* 708* 940*     CBC    Recent Labs  Lab 01/24/18  0612 01/23/18  0610 01/22/18  1310 01/22/18  0848   WBC 19.6* 20.1* 18.2* 19.0*   RBC 4.58 4.39* 4.73 5.38   HGB 14.3 13.7 15.0 17.1   HCT 40.9 39.5* 43.3 49.3   MCV 89 90 92 92   MCH 31.2 31.2 31.7 31.8   MCHC 35.0 34.7 34.6 34.7   RDW 15.5* 15.5* 15.2* 15.4*    245 276  Canceled, Test credited 283     INR    Recent Labs  Lab 01/24/18  0612 01/23/18  0610 01/22/18  0848   INR 1.17* 1.42* 1.08     Arterial Blood Gas    Recent Labs  Lab 01/24/18  0535 01/22/18  1450 01/22/18  1020   PH 7.45 7.40 7.27*   PCO2 37 39 48*   PO2 124* 85 210*   HCO3 26 24 22   O2PER 40 40% VENT 80%       All cultures:    Recent Labs  Lab 01/22/18  1310 01/22/18  1255 01/22/18  1015 01/22/18  0952 01/22/18  0910 01/22/18  0848   CULT No growth after 2 days No growth after 2 days No growth No growth after 2 days Moderate growthNormal rivka  Heavy growthMoraxella (Branhamella) catarrhalis*  Culture in progress No growth after 2 days     Recent Results (from the past 24 hour(s))   MR Cervical Spine w/o & w Contrast    Narrative    MRI CERVICAL SPINE WITHOUT AND WITH CONTRAST  1/23/2018  5:19 PM     HISTORY: Three plus hyperreflexia throughout upper and lower  extremities, and sustained ankle clonus.     TECHNIQUE: Multiplanar, multisequence MRI of  the cervical spine  without and with 8mL Gadavist.    COMPARISON: None.    FINDINGS: No abnormal signal within the visualized spinal cord. No  abnormal intramedullary or leptomeningeal enhancement of the spinal  cord.    Normal cervical lordosis. Anterior posterior alignment of the spine is  within normal limits. Vertebral body height is maintained without  evidence of fracture. There are no destructive osseous lesions. Marked  loss of intervertebral disc space with disc desiccation at all levels  in the cervical spine. There are Modic-type degenerative endplate  changes at C7-T1 with associated STIR hyperintense marrow edema.    The visualized portions of the brainstem and cerebellum are  unremarkable.    Level by level as follows:    C2-C3: Small posterior disc bulge and bilateral facet hypertrophy  without significant spinal canal or neural foraminal narrowing.     C3-C4: Posterior disc bulge and endplate osteophytic spurring  asymmetric on the right abuts the ventral right spinal cord. Bilateral  uncinate spurring and facet hypertrophy results in mild bilateral  neural foraminal narrowing.     C4-C5: Posterior disc bulge and endplate osteophytic spurring causes  mild spinal canal narrowing. Bilateral uncinate spurring and facet  hypertrophy results in moderate right and severe left neural foraminal  narrowing.     C5-C6: Posterior disc bulge and endplate osteophytic spurring without  significant spinal canal narrowing. Right greater than left uncinate  spurring and facet hypertrophy results in severe right and moderate  left neural foraminal narrowing.     C6-C7: Posterior disc bulge without significant spinal canal  narrowing. Bilateral uncinate spurring and facet hypertrophy results  in mild bilateral neural foraminal narrowing.     C7-T1: Posterior disc bulge and endplate osteophytic spurring without  spinal canal narrowing. Right greater than left uncinate spurring and  facet hypertrophy results in moderate  to severe right and mild left  neural foraminal narrowing.     ET tube and enteric tube partially visualized. Visualized sinuses  appear opacified.      Impression    IMPRESSION:   1. No abnormal signal or enhancement of the cervical spinal cord. No  high-grade spinal canal narrowing or evidence of spinal cord edema.  2. Multilevel degenerative changes in the cervical spine as described  above.    AILYN ESPINAL MD   MR Brain w/o & w Contrast    Narrative    MRI BRAIN WITHOUT AND WITH CONTRAST  1/23/2018 5:28 PM    HISTORY: Acute encephalopathy, concern for anoxic brain injury,  sustained clonus on exam.     TECHNIQUE: Multiplanar, multisequence MRI of the brain without and  with 8mL Gadavist.    COMPARISON: Head CT 1/22/2018.    FINDINGS: Areas of restricted diffusion in the deep periventricular  white matter left greater than right in the region of the body of the  lateral ventricles. There is also restricted diffusion in the white  matter surrounding the occipital horns of the lateral ventricles, the  posterior right centrum semiovale, as well as the right parietal lobe  cortex. The areas of restricted diffusion are also associated with T2  hyperintensity.    No evidence of acute intracranial hemorrhage. No mass effect or  midline shift. Chronic lacunar infarct in the right caudate head with  hemosiderin staining suggesting that it may have been a hemorrhagic  infarct. Ventricular size within normal limits without evidence of  hydrocephalus. The basal cisterns remain patent,     No abnormal intracranial enhancement.    ET tube and enteric tube are present. There is near complete  opacification of the paranasal sinuses with an air-fluid layer in the  maxillary sinuses. This is not unexpected in the setting of  intubation.      Impression    IMPRESSION:    1. Areas of ischemia in the deep periventricular white matter and to a  lesser extent the right centrum semiovale and right parietal lobe  cortex. Given the  distribution of infarct, this could be hypoxic  ischemic encephalopathy. No evidence of hemorrhagic transformation of  infarct. No significant mass effect or midline shift. No  hydrocephalus.  2. Chronic lacunar infarct in the right caudate head which may have  been hemorrhagic.  3. Extensive sinus mucosal thickening with air-fluid layers. This is  not unexpected in the setting of intubation.      AILYN ESPINAL MD       ECHO  Interpretation Summary  -Left ventricular systolic function is moderately reduced.  -The visual ejection fraction is estimated at 35-40%.  -There is moderate global hypokinesia of the left ventricle.  -Septal and inferior segments are more hypokinetic.  -There is moderate to severe eccentric left ventricular hypertrophy.  -The study was technically difficult. There is no comparison study available.  Interpretation per Dr. Tay.         Billing: This patient is critically ill: Yes. Total critical care time today 35 min.    DEVONTE Hartman MS4 am serving as scribe for Dr. Justice.

## 2018-01-24 NOTE — PROGRESS NOTES
Patient remains on full ventilatory support all nighrt     Ventilation Mode: CMV/AC  FiO2 (%): 40 %  Rate Set (breaths/minute): 14 breaths/min  Tidal Volume Set (mL): 550 mL  PEEP (cm H2O): 5 cmH2O  Oxygen Concentration (%): 40 %  Resp: 21      Will continue to follow.  1/24/2018  Rosey James

## 2018-01-24 NOTE — PROGRESS NOTES
Neuroscience Intensive Care Progress Note  2018    Joni Muñoz is a 67 year old year old male admitted on 2018 with altered mental status and hypoxic respiratory failure. Currently intubated and sedated on propofol for his agitation. MRI performed yesterday demonstrated no sign of cervical spine disease, but on the MRI of the brain there were bi-hemispheric diffusion restrictions which may be responsible for his diffuse hyperreflexia and clonus of BLE at the ankles. Suspect hypoxia as the cause of these lesions. Mental status is improving and he is following commands per report.     24 hour events:  MRI brain and cervical spine performed    24 Hour Vital Signs Summary:  Temperatures:  Current - Temp: 99.7  F (37.6  C); Max - Temp  Av.8  F (37.7  C)  Min: 98.8  F (37.1  C)  Max: 100.4  F (38  C)  Respiration range: Resp  Av.7  Min: 16  Max: 29  Pulse range: No Data Recorded  Blood pressure range: Systolic (24hrs), Avg:160 , Min:127 , Max:187   ; Diastolic (24hrs), Av, Min:72, Max:113    Pulse oximetry range: SpO2  Av.8 %  Min: 95 %  Max: 100 %    Ventilator Settings  Ventilation Mode: CMV/AC  FiO2 (%): 40 %  Rate Set (breaths/minute): 14 breaths/min  Tidal Volume Set (mL): 550 mL  PEEP (cm H2O): 5 cmH2O  Pressure Support (cm H2O): 5 cmH2O  Oxygen Concentration (%): 40 %  Resp: 21      Intake/Output Summary (Last 24 hours) at 18 1416  Last data filed at 18 1400   Gross per 24 hour   Intake          2549.28 ml   Output             2475 ml   Net            74.28 ml       BP - Mean:  [] 105    Current Medications:    carvedilol  6.25 mg Oral or Feeding Tube BID w/meals     methadone  60 mg Oral Daily     oxymetazoline  2 spray Both Nostrils BID     chlorhexidine  15 mL Mouth/Throat Q12H     insulin aspart  1-4 Units Subcutaneous Q4H     ipratropium - albuterol 0.5 mg/2.5 mg/3 mL  3 mL Nebulization Q4H     pantoprazole (PROTONIX) IV PEDS/NICU  40 mg Intravenous Daily      "enoxaparin  40 mg Subcutaneous Q24H     cefTRIAXone  2 g Intravenous Q24H       PRN Medications:  acetaminophen, hydrALAZINE, labetalol, glucose **OR** dextrose **OR** glucagon, naloxone, naloxone, albuterol, fentaNYL, propofol (DIPRIVAN) infusion **AND** propofol **AND** CK total **AND** Triglycerides, ondansetron **OR** ondansetron, senna-docusate **OR** senna-docusate, potassium chloride, potassium chloride, potassium chloride, potassium chloride with lidocaine, magnesium sulfate, sodium phosphate, sodium phosphate, sodium phosphate    Infusions:    NaCl 100 mL/hr at 01/24/18 0606     propofol (DIPRIVAN) infusion 10 mcg/kg/min (01/24/18 1415)       Allergies   Allergen Reactions     Acetaminophen Nausea       Physical Examination:  /81  Temp 99.7  F (37.6  C)  Resp 21  Ht 1.778 m (5' 10\")  Wt 71.9 kg (158 lb 8.2 oz)  SpO2 98%  BMI 22.74 kg/m2  Sedated. Intubated. Heart RRR. Following commands when off sedation. PERRL, +cough/gag, +corneal reflex, withdraw to noxious stimuli R>L      Labs/Studies:  Recent Labs   Lab Test  01/24/18   1030  01/24/18   0612  01/23/18   2327  01/23/18   0610  01/22/18   1310   NA  143  141   --   147*  144   POTASSIUM  3.3*  3.6  2.9*  3.4  5.4*   CHLORIDE  109  109   --   114*  110*   CO2  24  26   --   25  23   ANIONGAP  10  6   --   8  11   GLC  101*  111*   --   89  100*   BUN  10  11   --   24  32*   CR  0.52*  0.53*   --   0.89  1.78*  Canceled, Test credited   HEATH  7.8*  8.1*   --   7.4*  7.3*   WBC   --   19.6*   --   20.1*  18.2*   RBC   --   4.58   --   4.39*  4.73   HGB   --   14.3   --   13.7  15.0   PLT   --   247   --   245  276  Canceled, Test credited       Recent Labs   Lab Test  01/24/18   0612  01/23/18   0610  01/22/18   0848   INR  1.17*  1.42*  1.08         Recent Labs  Lab 01/24/18  0535 01/22/18  1450 01/22/18  1020   PH 7.45 7.40 7.27*   PCO2 37 39 48*   PO2 124* 85 210*   HCO3 26 24 22   O2PER 40 40% VENT 80%         Plan:   Suspect diffusion " cerebral injury as likely etiology for diffuse hyperreflexia in the absence of cervical spine pathology. Continue with supportive cares as mental status continues to improve. No further interventions from a neurologic perspective at this time. Will likely need PT/OT once extubated as I would suspect some level of disability with the lesions. Neurology will sign off at this time, please call with further questions.     Denny Padilla MD  Vascular Neurology/Neurocritical Care  Text Page - [phone]

## 2018-01-24 NOTE — CONSULTS
"CLINICAL NUTRITION SERVICES  -  ASSESSMENT NOTE      Recommendations Ordered by Registered Dietitian (RD):   Begin TF Promote with Fiber at 10 mL/hr;  After 8 hrs increase to 20 mL/hr.   Then increase by 15 mL every 12 hrs to preliminary goal 50 mL/hr = 1200 kcals, 76 gm pro (1.1 gm/kg), 1000 mL H20, 17 gm fiber, 166 gm CHO  Total (TF + Propofol):  1320 kcals (19 kcal/kg).  Free H20 60 mL every 4 hrs   Malnutrition:   Non-Severe malnutrition  In Context of:  Acute illness or injury  Environmental or social circumstances        REASON FOR ASSESSMENT  Joni Muñoz is a 67 year old male seen by Registered Dietitian for Provider Order - Registered Dietitian to Assess and Order TF per Medical Nutrition protocol      NUTRITION HISTORY  - Unable to obtain nutrition history as pt intubated and no family available.  Per RN, family told her that his diet is mostly food from [employer].  He drinks a significant amount of caffeine, up to 8 Monster drinks per day.  No food allergies/intolerances.  Note pt had been diagnosed with bronchitis on 1/13.      CURRENT NUTRITION ORDERS  Diet Order:     NPO   Was asked to initiate TF via NG tube.    Current Intake/Tolerance:  NPO x 3 days.      PHYSICAL FINDINGS  Observed  Muscle Wasting - temporal, clavicle  Subcutaneous fat loss - arms  Obtained from Chart/Interdisciplinary Team  None noted    ANTHROPOMETRICS  Height: 5' 10\"  Admit Weight:  70.3 kg  Body mass index is 22.28 kg/(m^2).  Weight Status:  Normal BMI  IBW:  75.4 kg  % IBW:  93%  Weight History: Down 4.3 kg (6%) over the past week    Wt Readings from Last 20 Encounters:   01/24/18 71.9 kg (158 lb 8.2 oz)   01/13/18 74.6 kg (164 lb 8 oz)   09/28/17 73.5 kg (162 lb)   09/21/17 73.9 kg (163 lb)   07/12/17 73.5 kg (162 lb)   06/28/17 72.6 kg (160 lb)   02/22/17 72.6 kg (160 lb)   12/14/16 75.8 kg (167 lb)   12/08/16 75.8 kg (167 lb)   09/06/16 72.4 kg (159 lb 9.6 oz)   05/17/16 74.8 kg (165 lb)   12/03/15 76.7 kg (169 lb) "   12/01/15 77.1 kg (170 lb)   03/26/15 79.8 kg (176 lb)   02/11/15 78.9 kg (174 lb)   01/20/15 78.9 kg (174 lb)   01/13/15 81.2 kg (179 lb)   14 77.6 kg (171 lb)   12/15/14 77.6 kg (171 lb)   14 81.6 kg (180 lb)       LABS  Labs reviewed  K 3.3 (L)  Phos 2.3 (L) --> pt received IV replacements  Mg yesterday 2.3 (NL).  LFTs high d/t shock liver - improving    MEDICATIONS  Medications reviewed  NaCl at 100 mL/hr - for fluid delivery  Propofol at 4.5 mL/hr = 120 kcals (lipid) - for sedation    Dosing Weight:  70.3 kg (admit wt)     ASSESSED NUTRITION NEEDS PER APPROVED PRACTICE GUIDELINES:  2/3 estimated needs during 1st week for permissive underfeedin4455-4304 kcals (17-20 kcal/kg), 60-74 gm pro (0.8-1.1 gm/kg)    Estimated Final Energy Needs:  7703-3602 kcals (25-30 Kcal/Kg)  Justification: maintenance  Estimated Final Protein Needs:   grams protein (1.3-1.6 g pro/Kg)  Justification: Repletion and hypercatabolism with critical illness  Estimated Fluid Needs:  0114-1129 mL (1 mL/Kcal)  Justification: maintenance    MALNUTRITION:  % Weight Loss:  > 2% in 1 week (severe malnutrition)  % Intake:  <75% for > 7 days (non-severe malnutrition) --> suspect  Subcutaneous Fat Loss:  Upper arm region mild depletion  Muscle Loss:  Temporal region mild depletion and Clavicle bone region mild depletion  Fluid Retention:  None noted    Malnutrition Diagnosis: Non-Severe malnutrition  In Context of:  Acute illness or injury  Environmental or social circumstances    NUTRITION DIAGNOSIS:  Inadequate protein-energy intake related to intubation as evidenced by NPO status, Propofol meeting 10% preliminary energy needs and 0% protein needs, and TF planned    NUTRITION INTERVENTIONS  Recommendations / Nutrition Prescription  Begin TF Promote with Fiber at 10 mL/hr;  After 8 hrs increase to 20 mL/hr.  Then increase by 15 mL every 12 hrs to preliminary goal 50 mL/hr = 1200 kcals, 76 gm pro (1.1 gm/kg), 1000 mL H20, 17 gm  fiber, 166 gm CHO  Total (TF + Propofol):  1320 kcals (19 kcal/kg).  Free H20 60 mL every 4 hrs      Implementation  Nutrition education: Not appropriate at this time due to patient condition  Collaboration and Referral of Nutrition care - Discussed pt during ICU interdisciplinary rounds this morning.  EN Composition, EN Schedule - Entered TF orders in EPIC as above  Feeding Tube Flush - Ordered std H20 flushes while on IVF    Nutrition Goals  TF preliminary goal Promote with Fiber at 50 mL/hr + Propofol will meet % estimated preliminary needs.      MONITORING AND EVALUATION:  Progress towards goals will be monitored and evaluated per protocol and Practice Guidelines    Bhavya Augustine, RD, LD, CNSC

## 2018-01-25 NOTE — PLAN OF CARE
Problem: Patient Care Overview  Goal: Plan of Care/Patient Progress Review  OT and PT: Patient noted to be intubated and sedated. Nurse completing PROM per their notes. Holding evals today.

## 2018-01-25 NOTE — PROGRESS NOTES
Critical Care Progress Note      01/25/2018    Name: Joni Muñoz MRN#: [id number]   Age: 67 year old YOB: 1950     Hsptl Day# 3  ICU DAY #2    MV DAY #2             Problem List:   Active Problems:    Encephalopathy acute  Severe sepsis  Community acquired pneumonia  Acute hypoxemic/hypercarbic respiratory failure  Elevated LFTs - resolving  Cardiomyopathy         Summary/Hospital Course:   67 year old male with a history of substance abuse (methadone- rx and cocaine recreationally) who presents to the emergency department today via EMS for evaluation of altered mental status and hypoxia. The patient was seen in clinic on January 13th and was diagnosed with bronchitis, with followup scheduled in 4 weeks with reported exposure to flu and rx for tamiflu and zpak. Per EMS, Mr. Muñoz was checked on by family yesterday and found to be minimally responsive, so they assumed he was sleeping.  When checked on by family again this morning, he was still minimally responsive so they became concerned and contacted 911. En route, his sats were in the 70s, blood sugar at 96, and sounded congested with wheezing. He was unresponsive with minimal moans to pain. At arrival, he remained unresponsive and was put on 10 L/min of oxygen. He is a methadone clinic patient and has not gone in for the past couple of days.     In ED patient was intubated upon arrival secondary to obtundation and inability to protect airway. ABG showed a acute hypercarbia. LA 2.6 with elevated blood pressures. Labs otherwise noteable for ANTONI with Cr 1.9, shock liver with AST ALT >1000 , WBC 19K. Head ct without acute abnormality and CXR possible LL infiltrate. Patient was bolused 30 ml/kg IVfluid and dosed antibiotics over concern for sepsis and admitted to ICU for further cares.      On initial evaluation in the ICU, Mr. Muñoz is sedated and intubated. I spoke with the patient's niece, who stated the patient has overdosed on his methadone a  few times in the past. He used up 3-4 methadone vials within the past few days. She believes his methadone dose is somewhere between 90-120mg. Additionally, she states Mr. Muñoz drinks a significant amount of caffeine, up to 8 monster drinks a day as well uses cocaine.  She states that he has not been complaining of any chest pain, headache, or neck pain.     1/23 - obtained MRI brain and C-spine for diffuse hyperreflexia and sustained clonus in ankle. NCC consulted as well. Patient's niece, Nataliia, plans on coming in tomorrow morning; she and her mother (patient's sister) were updated on Mr. Muñoz' condition over the phone.    1/24 - Discussed MRI findings with Nataliia (pt's niece). Pressure support trial completed. Will try again tomorrow for potential extubation.          Interim History:   Restarted on propofol sedation overnight due to over-breathing vent and coughing uncontrollably. Vitals stable, afebrile, BP much improved on Coreg. Minimal vent settings with good oxygenation/ventilation.         Assessment and plan :     Joni Muñoz is a 67 year old male with a history of substance abuse currently enrolled in a methadone clinic, tobacco use, and chronic back pain admitted on 1/22/2018 for acute encephalopathy and severe sepsis presumed 2/2 community acquired pneumonia.    I have personally reviewed the daily labs, imaging studies, cultures and discussed the case with referring physician and consulting physicians.     My assessment and plan by system for this patient is as follows:    Neurology/Psychiatry:   1. Acute encephalopathy - improved - MRI imaging concerning for ischemic/anoxic injury - significant and impairment unclear but based on distribution expect moderate to significant deficits .   2. Substance abuse - on methadone program (h/o methadone OD), h/o cocaine use; suspect overdose    3. 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 Discuss setting of a care conference with family, NCC, and Palliative. (NCC signed-off 1/25 - appreciate recs)  - Palliative consult   - minimize sedation, wean propofol to off today - goal RASS - 0 - consider precedex or prns   - supportive cares   - continue methadone 60mg PO daily (this is 1/2 home dose)    Cardiovascular:   1. NSTEMI type 2 - Q-waves in leads V1-V2, aVL --> ?septal infarct, in setting of cocaine use - stable  2. Cardiomyopathy - ECHO EF 35-40% w/ moderate global hypokinesia of LV and eccentric LV hypertrophy - started on coreg with improvement   3. HTN- suspect chronic exacerbated by acute illness/withdrawal   Plan  - coreg 6.125  - prn hydralazine for SBP >180    Pulmonary/Ventilator Management:   1. Acute hypoxemic/hypercarbic respiratory failure - improving - suspect methadone overdose; good oxygenation./ventilation mechanics on current vent settings  2. Community acquired pneumonia - no h/o MDRO or respiratory failure; rx'd Z-bing and Tamiflu as outpatient --> stable  3. Heavy tobacco use - ?underlying obstructive lung disease (spirometry from 3/26/2015 FEV1/FVC 67)  4. CT suggestive of acute sinusitis -on abx   Plan  - SBT  after methadone dose at 0900; if tolerates, plan to extubate if .    - Continue empiric antibiotics  - Albuterol and Duonebs ordered PRN for ?underlying obstructive disease 2/2 heavy tobacco use   - Afrin for 2/3 days acute sinusitis     GI and Nutrition :   1. Shock liver ( Elevated LFTs >1000) - improved - suspect 2/2 prolonged hypoxemia in setting of methadone OD; low suspicion for AI disease   Plan  - Continue enteral feedings; dietician consulted; at 20mL/hr with goal of 50mL/hr.     Renal/Fluids/Electrolytes:   1. Acute kidney injury - resolved; likely prerenal etiology, fluid resuscitated w/ Cr return to baseline and FENA 1%   2. Electrolyte abnormality - improved - intermittent hypokalemia and hypernatremia, and Pseudohypocalcemia  (corrected Ca2+ 8.8 mg/dL) and hyperosmolality (calculated Osm 316; measured 311 - unlikely unmeasured substance)  3. Anion gap metabolic acidosis - resolved - lactate down-trend; ?NAGMA given low AG, delta/delta ~1  4. Volume depletion - resolved  Plan  - monitor function and electrolytes as needed with replacement per ICU protocols.  - generally avoid nephrotoxic agents such as NSAID, IV contrast unless specifically required  - adjust medications as needed for renal clearance  - follow I/O's as appropriate.    Infectious Disease:   1. Severe sepsis - improved - ddx community acquired pneumonia; less likely urosepsis (UA normal), meningitis (exam and history unremarkable), endocarditis (no vegetation seen on ECHO and no h/o IV drug use)  - BCx2 no growth as of this morning sputum with moraxella heavy growth S pending   Plan  - continue ceftriaxone (complete 7 day course 4/7 )   - fup sensitivities      Endocrine:   1. Stress induced hyperglycemia - stable  Plan  - ICU insulin protocol, goal sugar <180 using SSI    Hematology/Oncology:   1. Leukocytosis - likely reactive - improving (15.8, down from 20.1)  Plan  - Follow CBCs    MSKL/Rheum:  No active problems     IV/Access:   1. Venous access - R ACF and L ACF (1/22)  2. Arterial access - not indicate at present  Plan  - central access and arterial are not yet required or necessary    ICU Prophylaxis:   1. DVT: enoxaparin  2. VAP: HOB 30 degrees, chlorhexidine rinse  3. Stress Ulcer: PPI  4. Restraints: Nonviolent soft two point restraints required and necessary for patient safety and continued cares and good effect as patient continues to pull at necessary lines, tubes despite education and distraction. Will readdress daily.   5. Wound care - per unit routine   6. Feeding - Continue enteral feeds (@ 20mL/hr; goal 50mL/hr per RD)  7. Family Update: pending  8. Disposition - ICU    Key goals for next 24 hours:   1. Wean propofol   2. Even fluid status    3. Attempt  extubation after pressure support trial. Will give 1/2 his regular methadone maintenance dose prior to pressure support trial.   4. Continue enteral feeds           Key Medications:       carvedilol  6.25 mg Oral or Feeding Tube BID w/meals     methadone  60 mg Oral Daily     oxymetazoline  2 spray Both Nostrils BID     chlorhexidine  15 mL Mouth/Throat Q12H     insulin aspart  1-4 Units Subcutaneous Q4H     ipratropium - albuterol 0.5 mg/2.5 mg/3 mL  3 mL Nebulization Q4H     pantoprazole (PROTONIX) IV PEDS/NICU  40 mg Intravenous Daily     enoxaparin  40 mg Subcutaneous Q24H     cefTRIAXone  2 g Intravenous Q24H       NaCl 100 mL/hr at 01/25/18 0542     propofol (DIPRIVAN) infusion 10 mcg/kg/min (01/25/18 0016)               Physical Examination:   Temp:  [98.8  F (37.1  C)-99.9  F (37.7  C)] 99.1  F (37.3  C)  Heart Rate:  [67-89] 73  Resp:  [16-35] 17  BP: (124-165)/() 155/93  FiO2 (%):  [40 %] 40 %  SpO2:  [70 %-98 %] 96 %      Intake/Output Summary (Last 24 hours) at 01/25/18 1138  Last data filed at 01/25/18 1000   Gross per 24 hour   Intake          3251.55 ml   Output             2660 ml   Net           591.55 ml         Wt Readings from Last 4 Encounters:   01/25/18 72 kg (158 lb 11.7 oz)   01/13/18 74.6 kg (164 lb 8 oz)   09/28/17 73.5 kg (162 lb)   09/21/17 73.9 kg (163 lb)     BP - Mean:  [] 123  Ventilation Mode: CMV/AC  FiO2 (%): 40 %  Rate Set (breaths/minute): 14 breaths/min  Tidal Volume Set (mL): 550 mL  PEEP (cm H2O): 5 cmH2O  Pressure Support (cm H2O): 5 cmH2O  Oxygen Concentration (%): 40 %  Resp: 17    Recent Labs  Lab 01/24/18  0535 01/22/18  1450 01/22/18  1020   PH 7.45 7.40 7.27*   PCO2 37 39 48*   PO2 124* 85 210*   HCO3 26 24 22   O2PER 40 40% VENT 80%       GEN: Adult male lying supine in bed, sedated with propofol at 5mcg/kg/min, became slightly agitated with propofol off (chewing on ET tube, raising arms towards tube), intubated  HEENT: head ncat, pupils 2mm symmetric,  sclera anicteric, OP patent with ETT in place, trachea midline   PULM: unlabored synchronous with vent, clear anteriorly    CV/COR: RRR S1S2 no gallop,  No rub, no murmur  ABD: soft, no apparent tenderness to palpation, hypoactive bowel sounds, no mass  EXT:  No edema, feet warm to touch w/ 2+ DP bilaterally  NEURO: Sedated, 2+ reflexes throughout, no ankle clonus, no rigidity in arms or legs, unable to assess coordination or motor.     SKIN: no obvious rash  LINES: clean, dry intact         Data:   All data and imaging reviewed     ROUTINE ICU LABS (Last four results)  CMP    Recent Labs  Lab 01/25/18  0435 01/24/18  1855 01/24/18  1030 01/24/18  0612 01/23/18  2327 01/23/18  0610 01/22/18  1310   NA  --   --  143 141  --  147* 144   POTASSIUM 3.7 3.9 3.3* 3.6 2.9* 3.4 5.4*   CHLORIDE  --   --  109 109  --  114* 110*   CO2  --   --  24 26  --  25 23   ANIONGAP  --   --  10 6  --  8 11   GLC  --   --  101* 111*  --  89 100*   BUN  --   --  10 11  --  24 32*   CR 0.55*  --  0.52* 0.53*  --  0.89 1.78*  Canceled, Test credited   GFRESTIMATED >90  --  >90 >90  --  86 38*  Canceled, Test credited   GFRESTBLACK >90  --  >90 >90  --  >90 46*  Canceled, Test credited   HEATH  --   --  7.8* 8.1*  --  7.4* 7.3*   MAG 2.2  Canceled, Test credited  --   --   --  2.3  --   --    PHOS 2.8  Canceled, Test credited  --  2.3*  --  1.7*  --   --    PROTTOTAL  --   --  6.5* 6.6*  --  6.0* 6.5*   ALBUMIN  --   --  2.4* 2.3*  --  2.2* 2.5*   BILITOTAL  --   --  0.7 0.5  --  0.4 0.6   ALKPHOS  --   --  111 114  --  100 115   AST  --   --  76* 98*  --  245* 749*   ALT  --   --  339* 366*  --  515* 708*     CBC    Recent Labs  Lab 01/25/18  0435 01/24/18  0612 01/23/18  0610 01/22/18  1310   WBC 15.8* 19.6* 20.1* 18.2*   RBC 4.59 4.58 4.39* 4.73   HGB 14.3 14.3 13.7 15.0   HCT 41.2 40.9 39.5* 43.3   MCV 90 89 90 92   MCH 31.2 31.2 31.2 31.7   MCHC 34.7 35.0 34.7 34.6   RDW 15.3* 15.5* 15.5* 15.2*    247 245 276  Canceled, Test  credited     INR    Recent Labs  Lab 01/24/18  0612 01/23/18  0610 01/22/18  0848   INR 1.17* 1.42* 1.08     Arterial Blood Gas    Recent Labs  Lab 01/24/18  0535 01/22/18  1450 01/22/18  1020   PH 7.45 7.40 7.27*   PCO2 37 39 48*   PO2 124* 85 210*   HCO3 26 24 22   O2PER 40 40% VENT 80%       All cultures:    Recent Labs  Lab 01/22/18  1310 01/22/18  1255 01/22/18  1015 01/22/18  0952 01/22/18  0910 01/22/18  0848   CULT No growth after 3 days No growth after 3 days No growth No growth after 3 days Moderate growthNormal rivka  Heavy growthMoraxella (Branhamella) catarrhalis*  Culture in progress No growth after 3 days     No results found for this or any previous visit (from the past 24 hour(s)).    ECHO  Interpretation Summary  -Left ventricular systolic function is moderately reduced.  -The visual ejection fraction is estimated at 35-40%.  -There is moderate global hypokinesia of the left ventricle.  -Septal and inferior segments are more hypokinetic.  -There is moderate to severe eccentric left ventricular hypertrophy.  -The study was technically difficult. There is no comparison study available.  Interpretation per Dr. Tay.         Billing: This patient is critically ill: Yes. Total critical care time today 35min.    DEVONTE Hartman MS4 am serving as scribe for Dr. Justice.

## 2018-01-25 NOTE — PLAN OF CARE
Problem: Ventilation, Mechanical Invasive (Adult)  Goal: Signs and Symptoms of Listed Potential Problems Will be Absent, Minimized or Managed (Ventilation, Mechanical Invasive)  Signs and symptoms of listed potential problems will be absent, minimized or managed by discharge/transition of care (reference Ventilation, Mechanical Invasive (Adult) CPG).   Outcome: Improving  More responsive today. Is currently tolerating no sedation after methadone and BP is now 140's/70s with coreg. Responds to voice, will wiggle toes and squeeze very lightly. Weaned 5/5 for 35 minutes today, discontinued do to RR in 30's otherwise tolerated well. Will try again tomorrow after methadone dose. Lungs clear, white thick secretions from ETT. Large BM this am. TF started at 1630 with a rate of 10 ml/hr. Goal 50/hr, increase to 20 at 0430. Turned and repositioned Q2H, with frequent oral cares. Abdelrahman William was here today and updated.

## 2018-01-25 NOTE — PLAN OF CARE
Problem: Ventilation, Mechanical Invasive (Adult)  Goal: Signs and Symptoms of Listed Potential Problems Will be Absent, Minimized or Managed (Ventilation, Mechanical Invasive)  Signs and symptoms of listed potential problems will be absent, minimized or managed by discharge/transition of care (reference Ventilation, Mechanical Invasive (Adult) CPG).   Neuro: Sedation resumed due to patient over breathing ventilator and coughing uncontrollably. Patient intermittently follows commands. PERRLA.    Cardiac: NSR. BP within limits.  Pulmonary: Vented. Thick tan secretions. Lung sounds diminished/coarse with improvement after suctioning.  GI:  Tolerating TF.  : Herman in place draining clear, yellow urine.  Integumentary: Clean, dry, intact.  Lines: PIVs    Morning labs have been reviewed. Spokesperson niece updated during shift. Continue to monitor and assess.    Alphonso Jay RN, BSN, CCRN      Problem: Restraint for Non-Violent/Non-Self-Destructive Behavior  Goal: Prevent/Manage Potential Problems  Maintain safety of patient and others during period of restraint.  Promote psychological and physical wellbeing.  Prevent injury to skin and involved body parts.  Promote nutrition, hydration, and elimination.   Outcome: No Change  Soft wrist restraints indicated for airway protection. CMS checks and PROM performed.

## 2018-01-25 NOTE — CONSULTS
[facility]    Palliative Care Consultation     Joni Muñoz  MRN# [id number]  Date of Admission:  1/22/2018  Date of Service (when I saw the patient): 01/25/18  Reason for consult: Consulted by Dr. Justice for Goals of care    Assessment & Plan   Joni Muñoz is a 67 year old male with PMH significant for chronic back pain, polysubstance abuse (cocaine) on methadone therapy, past unintentional drug overdose, and recent dx PNA/bronchitis/influenza A who presents with AMS and acute hypoxic respiratory failure. He was intubated on admission and being treated for severe sepsis likely 2/2 PNA. He has now been successfully extubated. 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 We are consulted for goals of care.     Symptoms/Recommendations   Care conference tomorrow at 1430. Pt's sherrie William and her fiance Raymon will be in attendance. Pt's sister Sonia will be patched in via phone. I offered for his other siblings to attend, but family states that they are not available. I will be in attendance, along with ICU team and hopefully NCC.     Support/Coping  -Sherrie William and her filuci Ennis are closest with pt. He is otherwise not  and has no children. He has 4 siblings, Sonia is the sister most involved.     Decisional Support, Goals of Care, Counseling & Coordination  Decisional Capacity Intact?  -No  Health Care Directive on File?  -No  Code Status/Resuscitation Preferences?  -Full     Discussion  Visited Saurav grace AM. He is post-extubation and breathing comfortably on facemask. He is alert, yet lethargic. He makes eye contact with me, but does not track with his eyes. He is able to squeeze with his right hand, but not his left. He is able to spontaneously move his left foot, but not his right. He is unable to meaningfully respond at this time.     I called his niece Nataliia  via phone, along with sister Sonia. Introduced the scope of our practice to Nataliia and Sonia. Discussed our potential roles for symptom management, support/coping, and decisional support (aka goals of care). Sonia is familiar with palliative care, as she met us during one of her hospitalizations.     I updated them on Saurav's condition today. They are open to having a care conference tomorrow at 1630.     Case reviewed with ICU team and bedside RN.     Thank you for involving us in the care of this patient and family. We will continue to follow. Please do not hesitate to contact me with questions or concerns or the on-call provider for our team if evening or weekend.    Vianney ANDERSON, [facility]  Palliative Medicine   Pager [phone]    Attestation:  Total time on the floor involved in the patient's care: 70 minutes  Total time spent in counseling/care coordination: >50%    Chief Complaint   AMS, hypoxia     History is obtained from the staff, family and extensive chart review.     Past Medical History    I have reviewed this patient's medical history and updated it with pertinent information if needed.   Past Medical History:   Diagnosis Date     Allergic rhinitis, cause unspecified      Back pain, chronic      Depressive disorder      Scoliosis (and kyphoscoliosis), idiopathic      Substance abuse      Tobacco use disorder        Past Surgical History   I have reviewed this patient's surgical history and updated it with pertinent information if needed.  Past Surgical History:   Procedure Laterality Date     BACK SURGERY       HERNIA REPAIR  1969     ORTHOPEDIC SURGERY      L arm        Social History   [place]    Family system: Niece Nataliia and her fiance Raymon are closest with pt. He is otherwise not  and has no children. He has 4 siblings, Sonia is the sister most involved.     Self-identified support system: As above     [place]ployment[place]education[place]    Activities/interests: [place]    Use of [employer]  resources: [place]    Episcopalian affiliation: [place]    Involvement in vj community: ND    Impact of illness on patient: Pt remains critically ill in the ICU. He is now extubated. He is likely to have severe disability of his brain injury.     Family History   I have reviewed this patient's family history and updated it with pertinent information if needed.   Family History   Problem Relation Age of Onset     Unknown/Adopted Mother      CEREBROVASCULAR DISEASE Father      DIABETES No family hx of      Coronary Artery Disease No family hx of      Hypertension No family hx of      Hyperlipidemia No family hx of      Breast Cancer No family hx of      Colon Cancer No family hx of      Prostate Cancer No family hx of      Other Cancer No family hx of      Depression No family hx of      Anxiety Disorder No family hx of      MENTAL ILLNESS No family hx of      Substance Abuse No family hx of      Anesthesia Reaction No family hx of      Asthma No family hx of      OSTEOPOROSIS No family hx of      Genetic Disorder No family hx of      Thyroid Disease No family hx of      Obesity No family hx of        Allergies   Allergies   Allergen Reactions     Acetaminophen Nausea       Medications   Current Facility-Administered Medications Ordered in Epic   Medication Dose Route Frequency Last Rate Last Dose     [START ON 1/26/2018] pantoprazole (PROTONIX) suspension 40 mg  40 mg Per Feeding Tube Daily         acetaminophen (TYLENOL) tablet 650 mg  650 mg Oral Q6H PRN   650 mg at 01/24/18 0038     carvedilol (COREG) tablet 6.25 mg  6.25 mg Oral or Feeding Tube BID w/meals   6.25 mg at 01/25/18 0800     methadone (DOLOPHINE-INTENSOL) 10 MG/ML (HIGH CONC) solution 60 mg  60 mg Oral Daily   60 mg at 01/25/18 0919     oxymetazoline (AFRIN) 0.05 % spray 2 spray  2 spray Both Nostrils BID         0.9% sodium chloride infusion   Intravenous Continuous 100 mL/hr at 01/25/18 0542       hydrALAZINE (APRESOLINE) injection 10 mg  10 mg Intravenous  Q4H PRN   10 mg at 01/24/18 0848     labetalol (NORMODYNE/TRANDATE) injection 10-20 mg  10-20 mg Intravenous Q4H PRN   20 mg at 01/24/18 0607     glucose 40 % gel 15-30 g  15-30 g Oral Q15 Min PRN        Or     dextrose 50 % injection 25-50 mL  25-50 mL Intravenous Q15 Min PRN        Or     glucagon injection 1 mg  1 mg Subcutaneous Q15 Min PRN         chlorhexidine (PERIDEX) 0.12 % solution 15 mL  15 mL Mouth/Throat Q12H   15 mL at 01/25/18 0745     naloxone (NARCAN) injection 0.1-0.4 mg  0.1-0.4 mg Intravenous Q2 Min PRN         insulin aspart (NovoLOG) inj (RAPID ACTING)  1-4 Units Subcutaneous Q4H   Stopped at 01/23/18 1639     ipratropium - albuterol 0.5 mg/2.5 mg/3 mL (DUONEB) neb solution 3 mL  3 mL Nebulization Q4H   3 mL at 01/25/18 1142     albuterol neb solution 2.5 mg  2.5 mg Nebulization Q2H PRN         fentaNYL (PF) (SUBLIMAZE) injection  mcg   mcg Intravenous Q1H PRN   100 mcg at 01/25/18 0355     propofol (DIPRIVAN) infusion  5-75 mcg/kg/min Intravenous Continuous 4.5 mL/hr at 01/25/18 0016 10 mcg/kg/min at 01/25/18 0016    And     propofol (DIPRIVAN) infusion 10-20 mg  10-20 mg Intravenous Q30 Min PRN   20 mg at 01/22/18 2329     enoxaparin (LOVENOX) injection 40 mg  40 mg Subcutaneous Q24H   40 mg at 01/24/18 1606     ondansetron (ZOFRAN-ODT) ODT tab 4 mg  4 mg Oral Q6H PRN        Or     ondansetron (ZOFRAN) injection 4 mg  4 mg Intravenous Q6H PRN         senna-docusate (SENOKOT-S;PERICOLACE) 8.6-50 MG per tablet 1 tablet  1 tablet Oral BID PRN        Or     senna-docusate (SENOKOT-S;PERICOLACE) 8.6-50 MG per tablet 2 tablet  2 tablet Oral BID PRN         potassium chloride SA (K-DUR/KLOR-CON M) CR tablet 20-40 mEq  20-40 mEq Oral Q2H PRN         potassium chloride (KLOR-CON) Packet 20-40 mEq  20-40 mEq Oral or Feeding Tube Q2H PRN   20 mEq at 01/24/18 1606     potassium chloride 10 mEq in 100 mL sterile water intermittent infusion (premix)  10 mEq Intravenous Q1H PRN         potassium  chloride 10 mEq in 100 mL intermittent infusion with 10 mg lidocaine  10 mEq Intravenous Q1H PRN         magnesium sulfate 4 g in 100 mL sterile water (premade)  4 g Intravenous Q4H PRN         sodium phosphate 15 mmol in D5W intermittent infusion  15 mmol Intravenous Daily PRN   15 mmol at 01/24/18 1517     sodium phosphate 20 mmol in D5W intermittent infusion  20 mmol Intravenous Q6H PRN   20 mmol at 01/24/18 0144     sodium phosphate 25 mmol in D5W intermittent infusion  25 mmol Intravenous Q8H PRN         cefTRIAXone (ROCEPHIN) 2 g in 20 mL SWFI Premix Syringe  2 g Intravenous Q24H   2 g at 01/25/18 1306     No current Epic-ordered outpatient prescriptions on file.       Review of Systems   The comprehensive review of systems is not completed at this time. He appears comfortable.     Physical Exam   Temp: 99.1  F (37.3  C) Temp src: Bladder BP: 167/89   Heart Rate: 78 Resp: 22 SpO2: 94 % O2 Device: BiPAP/CPAP    Vitals:    01/23/18 0500 01/24/18 0547 01/25/18 0400   Weight: 74.7 kg (164 lb 10.9 oz) 71.9 kg (158 lb 8.2 oz) 72 kg (158 lb 11.7 oz)     CONSTITUTIONAL: Chronically ill man seen lying in ICU bed in NAD, alert yet lethargic. No verbal response. He appears calm and comfortable.  HEENT: NCAT  RESPIRATORY: NL respiratory effort on facemask   NEUROLOGIC: Alert yet lethargic. Able to make eye contact, but no good eye tracking. Able to squeeze with left hand and move left foot spontaneously, unable to squeeze with right hand or move right foot on command     Data   Results for orders placed or performed during the hospital encounter of 01/22/18 (from the past 24 hour(s))   Potassium   Result Value Ref Range    Potassium 3.9 3.4 - 5.3 mmol/L   Glucose by meter   Result Value Ref Range    Glucose 117 (H) 70 - 99 mg/dL   Glucose by meter   Result Value Ref Range    Glucose 98 70 - 99 mg/dL   Glucose by meter   Result Value Ref Range    Glucose 119 (H) 70 - 99 mg/dL   CBC with platelets differential   Result  Value Ref Range    WBC 15.8 (H) 4.0 - 11.0 10e9/L    RBC Count 4.59 4.4 - 5.9 10e12/L    Hemoglobin 14.3 13.3 - 17.7 g/dL    Hematocrit 41.2 40.0 - 53.0 %    MCV 90 78 - 100 fl    MCH 31.2 26.5 - 33.0 pg    MCHC 34.7 31.5 - 36.5 g/dL    RDW 15.3 (H) 10.0 - 15.0 %    Platelet Count 214 150 - 450 10e9/L    Diff Method Automated Method     % Neutrophils 73.1 %    % Lymphocytes 15.0 %    % Monocytes 10.3 %    % Eosinophils 1.1 %    % Basophils 0.2 %    % Immature Granulocytes 0.3 %    Nucleated RBCs 0 0 /100    Absolute Neutrophil 11.5 (H) 1.6 - 8.3 10e9/L    Absolute Lymphocytes 2.4 0.8 - 5.3 10e9/L    Absolute Monocytes 1.6 (H) 0.0 - 1.3 10e9/L    Absolute Eosinophils 0.2 0.0 - 0.7 10e9/L    Absolute Basophils 0.0 0.0 - 0.2 10e9/L    Abs Immature Granulocytes 0.1 0 - 0.4 10e9/L    Absolute Nucleated RBC 0.0    Ammonia   Result Value Ref Range    Ammonia 35 10 - 50 umol/L   Creatinine   Result Value Ref Range    Creatinine 0.55 (L) 0.66 - 1.25 mg/dL    GFR Estimate >90 >60 mL/min/1.7m2    GFR Estimate If Black >90 >60 mL/min/1.7m2   Magnesium   Result Value Ref Range    Magnesium Canceled, Test credited 1.6 - 2.3 mg/dL   Phosphorus   Result Value Ref Range    Phosphorus Canceled, Test credited 2.5 - 4.5 mg/dL   Potassium   Result Value Ref Range    Potassium 3.7 3.4 - 5.3 mmol/L   Magnesium   Result Value Ref Range    Magnesium 2.2 1.6 - 2.3 mg/dL   Phosphorus   Result Value Ref Range    Phosphorus 2.8 2.5 - 4.5 mg/dL   Glucose by meter   Result Value Ref Range    Glucose 122 (H) 70 - 99 mg/dL   Glucose by meter   Result Value Ref Range    Glucose 111 (H) 70 - 99 mg/dL

## 2018-01-25 NOTE — PROGRESS NOTES
Patient Extubated at 12:12pm to Oxymask 10L, without stridor, SpO2 98%. BBS clear diminished. Stable vital sign. Patient tolerated well will continue to follow.    Raul Abernathy RT  1/25/2018

## 2018-01-25 NOTE — PROVIDER NOTIFICATION
Earlier today TF put on hold in anticipation of extubation.  Pt extubated midday and OG came out along with ETT.    A care conference is setup for tomorrow to discuss goals of care.  There is scheduled antiHTN to go down FT this evening, and pt is hypertensive.    Discussed with ; PRN antiHTN orders modified and  also recommended to hold off on NG placement until after care conference.

## 2018-01-25 NOTE — PROGRESS NOTES
[facility] ICU RESPIRATORY NOTE  Date of Admission: 1/22/2018  Date of Intubation (most recent): 1/22/2018  Reason for Mechanical Ventilation:  Acute hypoxemic/hypercarbic respiratory failure    Number of Days on Mechanical Ventilation: 3  Met Criteria for Pressure Support Trial: Yes  Length of Pressure Support Trial: PS 5/5 40% 30 minutes  Reason for Stopping Pressure Support Trial: tachypnic     Significant Events Today: None  Ventilation Mode: CMV/AC  FiO2 (%): 40 %  Rate Set (breaths/minute): 14 breaths/min  Tidal Volume Set (mL): 550 mL  PEEP (cm H2O): 5 cmH2O  Pressure Support (cm H2O): 5 cmH2O  Oxygen Concentration (%): 40 %  Resp: 19    ABG Results:     Recent Labs  Lab 01/24/18  0535 01/22/18  1450 01/22/18  1020   PH 7.45 7.40 7.27*   PCO2 37 39 48*   PO2 124* 85 210*   HCO3 26 24 22   O2PER 40 40% VENT 80%       ETT appearance on chest x-ray: ET tube in good position    Plan:  Continue full mechanical ventilatory support.

## 2018-01-26 NOTE — PROGRESS NOTES
CLINICAL NUTRITION SERVICES - REASSESSMENT NOTE      Future/Additional Recommendations: If unable to take intake orally (and aggressive cares desired), recommend Isosource 1.5 at 60 mL/hr to provide:  2160 kcal (31 kcal/kg), 98 g protein (1.4 g/kg), 253 g CHO, 22 g fiber, 1094 mL H2O   Malnutrition: % Weight Loss:  > 2% in 1 week (severe malnutrition)(1/24)  % Intake:  </= 50% for >/= 5 days (severe malnutrition)  Subcutaneous Fat Loss:  Upper arm region mild depletion (1/24)  Muscle Loss:  Temporal region mild depletion and Clavicle bone region mild depletion (1/24)  Fluid Retention:  None noted    Malnutrition Diagnosis: Severe malnutrition  In Context of:  Acute illness or injury  Environmental or social circumstances        EVALUATION OF PROGRESS TOWARD GOALS   Diet:  NPO  Nutrition Support:  TF was initiated on 1/24 (10 mL/hr), advanced to 20 mL/hr yesterday but then held for extubation.  Remains NPO with SLP nayla pending.  Now day #5 inadequate intake.     Dosing Weight:  70.3 kg (admit wt)      ASSESSED NUTRITION NEEDS PER APPROVED PRACTICE GUIDELINES:  Estimated Final Energy Needs:  4973-8134 kcals (25-30 Kcal/Kg)  Justification: maintenance  Estimated Final Protein Needs:   grams protein (1.3-1.6 g pro/Kg)  Justification: repletion and hypercatabolism with critical illness      NEW FINDINGS:   Patient was extubated on 1/25, Propofol d/c'd and OG pulled with extubation   IVF at 100 mL/hr  BM x 1 today and x 2 yesterday  Plan for SLP nayla today and family conference later this afternoon     Previous Goals (1/24):   TF preliminary goal Promote with Fiber at 50 mL/hr + Propofol will meet % estimated preliminary needs  Evaluation: Not met    Previous Nutrition Diagnosis (1/24):   Inadequate protein-energy intake related to intubation as evidenced by NPO status, Propofol meeting 10% preliminary energy needs and 0% protein needs, and TF planned  Evaluation: No change      MALNUTRITION  % Weight Loss:   > 2% in 1 week (severe malnutrition)(1/24)  % Intake:  </= 50% for >/= 5 days (severe malnutrition)  Subcutaneous Fat Loss:  Upper arm region mild depletion (1/24)  Muscle Loss:  Temporal region mild depletion and Clavicle bone region mild depletion (1/24)  Fluid Retention:  None noted    Malnutrition Diagnosis: Severe malnutrition  In Context of:  Acute illness or injury  Environmental or social circumstances    CURRENT NUTRITION DIAGNOSIS  Inadequate protein-energy intake related to NPO, TF has been discontinued with extubation as evidenced by meeting 0% needs    INTERVENTIONS  Recommendations / Nutrition Prescription  If unable to take intake orally (and aggressive cares desired), recommend Isosource 1.5 at 60 mL/hr to provide:  2160 kcal (31 kcal/kg), 98 g protein (1.4 g/kg), 253 g CHO, 22 g fiber, 1094 mL H2O    Implementation  None     Goals  Patient will receive nutrition within the next 24-48 hours     MONITORING AND EVALUATION:  Progress towards goals will be monitored and evaluated per protocol and Practice Guidelines    Roxie Patrick RD, LD, CNSC   Clinical Dietitian - [facility]

## 2018-01-26 NOTE — PLAN OF CARE
Problem: Goal/Outcome  Goal: Goal Outcome Summary  Outcome: No Change  Pt tolerated pressure support and extubated. VSS, except hypertensive. Labetalol prn and hydralazine prn x1. Tele SR. Fent x2. Pt intermittently follows commands on L side. PERRL.  Palliative conference planned for tomorrow at 16:30. Family updated. Will continue to monitor.

## 2018-01-26 NOTE — PLAN OF CARE
Problem: Patient Care Overview  Goal: Plan of Care/Patient Progress Review  Discharge Planner SLP   Patient plan for discharge: Not stated  Current status: Clinical swallow evaluation completed.  Patient presents with severe oropharyngeal dysphagia, evidenced by delayed swallow response, need for double swallow per minimal trial, and overt Sx of aspiration of secretions/minimal ice chip trials with stridor and breathing changes after trials.  Patient unable to follow basic commands other than verbalizing his first name.  Recommend strict NPO, oral cares with limited moisture if accepted by patient.   Barriers to return to prior living situation: Cognitive and language barriers, weakness.   Recommendations for discharge: TCU, pending progress.   Rationale for recommendations: Recommend SLP services continuation for safe return to oral intake, speech-language intervention.        Entered by: Jessica Timmons 01/26/2018 4:27 PM

## 2018-01-26 NOTE — PROGRESS NOTES
Pt currently on 5Lnc, BS course/diminished t/o. RR20 SPO2 94%. All nebs given as ordered. Will cont to follow.     1/26/2018  Cele Spicer RRT

## 2018-01-26 NOTE — PROGRESS NOTES
Consult dictated. Patient seen by ICU provider earlier today and stable for transfer from unit.  Admitted after found unresponsive by family. Intubated for airway protection. Imaging suggests anoxic brain injury. Also treated for severe sepsis secondary to CAP.   Plan for care conference today at 430pm. Discussed with Vianney of Palliative service. Will discuss goals of care at that time. Anticipate patient will need to discharge to care facility as he is requiring support. Unclear how much rehab potential at this time. Will plan to discuss plan for nutrition; NG pulled at time of extubation with plan for SLP eval; patient with very minimal verbal response and requiring suction. I anticipate he will fail swallow. Need a way to deliver daily methadone & nutrition until longer term plan in place. May require PEG ultimately, but will need NG in the meantime. Nutrition paged in anticipation of resuming TF tonight. If this is restarted will decrease NS to 50/hr. Note new cardiomyopathy. Will discuss with Vianney after conference. Appreciate Palliative assistance.     Maddy Calixto PA-C

## 2018-01-26 NOTE — PROGRESS NOTES
Patient transferring to [facility].  Report called to RUFUS Flores. Attempted to call sister, but no phone number listed in computer.

## 2018-01-26 NOTE — PROGRESS NOTES
Palliative follow up.     Pt is seen sitting up in bed, alert, apparently recognizing family, but I did not witness any meaningful verbal response.     Extensive goals of care conversation with pt's sister Sonia via phone, sherrie William and her fiance Raymon. They are serving as pt's surrogate decision makers, in the absence of a health care directive. Pt has other adult siblings who are intermittently involved for emotional support, but declining being apart of decision making.     Conversation was later joined by Concepcion Mon NP (Neuro)    Discussed pt's hospitalization, including respiratory failure from PNA and potential unintentional methadone overdose, ultimately leading to an anoxic brain injury/CVA. Recovery from this process is largely unknown, but likely to have residual motor deficits, possibly language changes/issues, and residual dysphagia ultimately leading to the need for likely nursing facility care for the remainder of his life. Talked about options moving forward, including a TCU discharge vs hospice care.     Discussed the need to ensure pt can receive methadone. This may require NJ/PEG placement to ensure he gets this safely.     Family ultimately determines that goals remain restorative with plan for TCU/LTACH rehabilitation post discharge. They are agreeable to placing an NJ for methadone administration and short term nutrition.    Continue SLP/PT/OT over the weekend     Will likely anticipate PEG placement early next week, unless swallowing greatly improves over the course of the next few days. This will ensure access for nutrition and methadone adminstration    SW will assist with dispo planning. [facility]. Did discuss that methadone maintenance can be a barrier to placement. Family understands.     Moving forward, did discuss variable and uncertain recovery process and rehabilitation potential. Did express to family that future illness/setbacks are likely.  "Strongly advised DNR/DNI. Nataliia and Raymon were receptive to this and Raymon recalls Saurav saying he would not want \"heroic measures\" at end of life. Sonia felt strongly he should remain full code. Advised ongoing discussion regarding code status and told family we can revisit this early next week.    Will plan to check in with family early next week.     Case reviewed at length with Dr. Denise, Dr. Padilla ([facility]), hospitalist MYLENE Calixto, nursing staff, and SLP.     Total time on the floor involved in the patient's care: 60 minutes  Total time spent in counseling/care coordination: >50%    Vianney ANDERSON, YAMILETH  Palliative Medicine   Pager: [phone]        "

## 2018-01-26 NOTE — PROGRESS NOTES
CLINICAL SWALLOW EVALUATION       01/26/18 1600   General Information   Onset Date 01/22/18   Start of Care Date 01/26/18   Patient Profile Review/OT: Additional Occupational Profile Info See Profile for full history and prior level of function   Patient/Family Goals Statement Unable to state   Swallowing Evaluation Bedside swallow evaluation   Behaviorial Observations Confused;Lethargic   Mode of current nutrition NPO   Respiratory Status Extubated on (date)  (1/25 at 1200)   Comments Patient admitted with acute encephalopathy and sepsis with suspected methadone overdose.  Patient's PMH is significant for methadone use, hx of cocaine use, chronic back pain, and prior CVAs.     Clinical Swallow Evaluation   Oral Musculature unable to assess due to poor participation/comprehension   Mandibular Strength and Mobility intact  (Clenched jaw in response to oral cares)   Additional Documentation Yes   Additional evaluation(s) completed today No   Clinical Swallow Eval: Thin Liquid Texture Trial   Mode of Presentation, Thin Liquids spoon;fed by clinician   Volume of Liquid or Food Presented 2 minimal ice chips   Oral Phase of Swallow Poor AP movement;Premature pharyngeal entry   Pharyngeal Phase of Swallow impaired;repeated swallows;coughing/choking;throat clearing;other (see comments)  (Change in respiration)   Diagnostic Statement Overt Sx of aspiration with secretions/ice chips.    Swallow Eval: Clinical Impressions   Skilled Criteria for Therapy Intervention Skilled criteria met.  Treatment indicated.   Functional Assessment Scale (FAS) 1   Dysphagia Outcome Severity Scale (AMA) Level 1 - AMA   Treatment Diagnosis Severe oropharyngeal dysphagia   Diet texture recommendations NPO   Demonstrates Need for Referral to Another Service dietitian;respiratory therapy;social work   Therapy Frequency daily   Predicted Duration of Therapy Intervention (days/wks) 1 week   Anticipated Discharge Disposition inpatient rehabilitation  facility  (Pending care planning)   Risks and Benefits of Treatment have been explained. Yes   Patient, family and/or staff in agreement with Plan of Care Yes   Clinical Impression Comments Patient presents with severe oropharyngeal dysphagia, evidenced by delayed swallow response, need for double swallow per minimal trial, and overt Sx of aspiration of secretions/minimal ice chip trials with stridor and breathing changes after trials.  Patient unable to follow basic commands other than verbalizing his first name.  Recommend strict NPO, oral cares with limited moisture if accepted by patient.    Total Evaluation Time   Total Evaluation Time (Minutes) 30

## 2018-01-26 NOTE — PLAN OF CARE
Problem: Patient Care Overview  Goal: Plan of Care/Patient Progress Review  Outcome: No Change  Pt had no major changes throughout night. Completely out on right side, weak on the left. Pt can state name but otherwise is unable to speak. TAMIA. Pt hypertensive throughout night, PRN labetalol and hydralazine given. Nasal cannula, 5L. Productive cough, upper airway congestion, NT suctioned 2X. Herman in place, adequate urine output. Palliative care conference scheduled for 1430 today. Will continue to monitor.

## 2018-01-26 NOTE — PROGRESS NOTES
Critical Care Progress Note      01/26/2018    Name: Joni Muñoz MRN#: [id number]   Age: 67 year old YOB: 1950     Hsptl Day# 4  ICU DAY #2    MV DAY #2             Problem List:   Active Problems:    Encephalopathy acute  Severe sepsis  Community acquired pneumonia  Acute hypoxemic/hypercarbic respiratory failure  Elevated LFTs - resolving  Cardiomyopathy         Summary/Hospital Course:   67 year old male with a history of substance abuse (methadone- rx and cocaine recreationally) who presents to the emergency department today via EMS for evaluation of altered mental status and hypoxia. The patient was seen in clinic on January 13th and was diagnosed with bronchitis, with followup scheduled in 4 weeks with reported exposure to flu and rx for tamiflu and zpak. Per EMS, Mr. Muñoz was checked on by family yesterday and found to be minimally responsive, so they assumed he was sleeping.  When checked on by family again this morning, he was still minimally responsive so they became concerned and contacted 911. En route, his sats were in the 70s, blood sugar at 96, and sounded congested with wheezing. He was unresponsive with minimal moans to pain. At arrival, he remained unresponsive and was put on 10 L/min of oxygen. He is a methadone clinic patient and has not gone in for the past couple of days.     In ED patient was intubated upon arrival secondary to obtundation and inability to protect airway. ABG showed a acute hypercarbia. LA 2.6 with elevated blood pressures. Labs otherwise noteable for ANTONI with Cr 1.9, shock liver with AST ALT >1000 , WBC 19K. Head ct without acute abnormality and CXR possible LL infiltrate. Patient was bolused 30 ml/kg IVfluid and dosed antibiotics over concern for sepsis and admitted to ICU for further cares.      On initial evaluation in the ICU, Mr. Muñoz is sedated and intubated. I spoke with the patient's niece, who stated the patient has overdosed on his methadone a  few times in the past. He used up 3-4 methadone vials within the past few days. She believes his methadone dose is somewhere between 90-120mg. Additionally, she states Mr. Muñoz drinks a significant amount of caffeine, up to 8 monster drinks a day as well uses cocaine.  She states that he has not been complaining of any chest pain, headache, or neck pain.     1/23 - obtained MRI brain and C-spine for diffuse hyperreflexia and sustained clonus in ankle. NCC consulted as well. Patient's niece, Nataliia, plans on coming in tomorrow morning; she and her mother (patient's sister) were updated on Mr. Muñoz' condition over the phone.    1/24 - Discussed MRI findings with Nataliia (pt's niece). Pressure support trial completed. Will try again tomorrow for potential extubation.   1/25 - extubated, palliative consulted           Interim History:     - awake alert difficult moving extremities   - no other major events overnight       Assessment and plan :     Joni Muñoz is a 67 year old male with a history of substance abuse currently enrolled in a methadone clinic, tobacco use, and chronic back pain admitted on 1/22/2018 for acute encephalopathy and severe sepsis presumed 2/2 community acquired pneumonia.    I have personally reviewed the daily labs, imaging studies, cultures and discussed the case with referring physician and consulting physicians.     My assessment and plan by system for this patient is as follows:    Neurology/Psychiatry:   1. Acute encephalopathy - improved - MRI imaging concerning for ischemic/anoxic injury - significant and impairment unclear but based on distribution expect moderate to significant deficits .   2. Substance abuse - on methadone program (h/o methadone OD), h/o cocaine use; suspect overdose    3. CT head suggestive of prior CVAs (right basal ganglia and corpus callosum distribution) - confirmed on MRI  Plan  - Update family. Discuss setting of a care conference with family, NCC, and  Palliative. ([facility] signed-off 1/25 - appreciate recs)  - Palliative following    - minimize sedation, wean propofol to off today - goal RASS - 0 - consider precedex or prns   - supportive cares   - continue methadone 60mg PO daily (this is 1/2 home dose)    Cardiovascular:   1. NSTEMI type 2 - Q-waves in leads V1-V2, aVL --> ?septal infarct, in setting of cocaine use - stable  2. Cardiomyopathy - ECHO EF 35-40% w/ moderate global hypokinesia of LV and eccentric LV hypertrophy - started on coreg with improvement   3. HTN- suspect chronic exacerbated by acute illness/withdrawal   Plan  - coreg 6.125  - prn hydralazine for SBP >180    Pulmonary/Ventilator Management:   1. Acute hypoxemic/hypercarbic respiratory failure - improving - suspect methadone overdose; good oxygenation./ventilation mechanics on current vent settings -resolved   2. Community acquired pneumonia - no h/o MDRO or respiratory failure; rx'd Z-bing and Tamiflu as outpatient --> stable  3. Heavy tobacco use - ?underlying obstructive lung disease (spirometry from 3/26/2015 FEV1/FVC 67)  4. CT suggestive of acute sinusitis -on abx   Plan  - SBT  after methadone dose at 0900; if tolerates, plan to extubate if .    - Continue empiric antibiotics  - Albuterol and Duonebs ordered PRN for ?underlying obstructive disease 2/2 heavy tobacco use   - DC Afrin     GI and Nutrition :   1. Shock liver ( Elevated LFTs >1000) - improved - suspect 2/2 prolonged hypoxemia in setting of methadone OD;  Plan  - NPO after extubation  - speech eval       Renal/Fluids/Electrolytes:   1. Acute kidney injury - resolved;   2. Electrolyte abnormality - improved - intermittent hypokalemia and hypernatremia, and Pseudohypocalcemia (corrected Ca2+ 8.8 mg/dL) and hyperosmolality (calculated Osm 316; measured 311 - unlikely unmeasured substance)  3. Anion gap metabolic acidosis - resolved - lactate down-trend; ?NAGMA given low AG, delta/delta ~1  Plan  - monitor function and electrolytes  as needed with replacement per ICU protocols.  - generally avoid nephrotoxic agents such as NSAID, IV contrast unless specifically required  - adjust medications as needed for renal clearance  - follow I/O's as appropriate.    Infectious Disease:   1. Severe sepsis - improved - ddx community acquired pneumonia; less likely urosepsis (UA normal), meningitis (exam and history unremarkable), endocarditis (no vegetation seen on ECHO and no h/o IV drug use)  - BCx2 no growth as of this morning sputum with moraxella heavy growth S pending   Plan  - continue ceftriaxone (complete 7 day course 5/7 )   - fup sensitivities      Endocrine:   1. Stress induced hyperglycemia - stable  Plan  - ICU insulin protocol, goal sugar <180 using SSI    Hematology/Oncology:   1. Leukocytosis - likely reactive - improving (15.8, down from 20.1)  Plan  - Follow CBCs    MSKL/Rheum:  No active problems     IV/Access:   1. Venous access - R ACF and L ACF (1/22)  2. Arterial access - not indicate at present  Plan  - central access and arterial are not yet required or necessary    ICU Prophylaxis:   1. DVT: enoxaparin  2. Asp : HOB 30 degrees,   3. Stress Ulcer: PPI  4. Restraints: not required .   5. Wound care - per unit routine   6. Feeding - NPO   7. Family Update: over phone   8. Disposition - ICU    Key goals for next 24 hours:   1. Swallow eval  2. Family conference with palliative and patient this afternoon  3. Consult hospitalist - anticipate transfer to floor  4. PT/OT          Key Medications:       pantoprazole  40 mg Per Feeding Tube Daily     carvedilol  6.25 mg Oral or Feeding Tube BID w/meals     methadone  60 mg Oral Daily     oxymetazoline  2 spray Both Nostrils BID     chlorhexidine  15 mL Mouth/Throat Q12H     insulin aspart  1-4 Units Subcutaneous Q4H     ipratropium - albuterol 0.5 mg/2.5 mg/3 mL  3 mL Nebulization Q4H     enoxaparin  40 mg Subcutaneous Q24H     cefTRIAXone  2 g Intravenous Q24H       NaCl 100 mL/hr at  01/26/18 0800     propofol (DIPRIVAN) infusion 10 mcg/kg/min (01/25/18 0016)               Physical Examination:   Temp:  [99.1  F (37.3  C)-100.4  F (38  C)] 99.9  F (37.7  C)  Heart Rate:  [75-93] 85  Resp:  [10-27] 20  BP: (136-171)/() 155/88  FiO2 (%):  [40 %] 40 %  SpO2:  [92 %-96 %] 93 %      Intake/Output Summary (Last 24 hours) at 01/26/18 1024  Last data filed at 01/26/18 1000   Gross per 24 hour   Intake             2400 ml   Output             2335 ml   Net               65 ml       Wt Readings from Last 4 Encounters:   01/26/18 67.1 kg (147 lb 14.9 oz)   01/13/18 74.6 kg (164 lb 8 oz)   09/28/17 73.5 kg (162 lb)   09/21/17 73.9 kg (163 lb)     BP - Mean:  [103-134] 112  Ventilation Mode: CPAP/PS  FiO2 (%): 40 %  Rate Set (breaths/minute): 14 breaths/min  Tidal Volume Set (mL): 550 mL  PEEP (cm H2O): 5 cmH2O  Pressure Support (cm H2O): 5 cmH2O  Oxygen Concentration (%): 40 %  Resp: 20    Recent Labs  Lab 01/24/18  0535 01/22/18  1450 01/22/18  1020   PH 7.45 7.40 7.27*   PCO2 37 39 48*   PO2 124* 85 210*   HCO3 26 24 22   O2PER 40 40% VENT 80%       GEN: adult male, NAD , alert   HEENT: head ncat, pupils 2mm symmetric, sclera anicteric, OP patent with ETT in place, trachea midline   PULM: unlabored clear to auscultation bilaterally    CV/COR: RRR S1S2 no gallop,  No rub, no murmur  ABD: soft, no apparent tenderness to palpation, hypoactive bowel sounds, no mass  EXT:  No edema, feet warm to touch w/ 2+ DP bilaterally  NEURO:  Alert, follows, tries to communicate, follows commands but motor difficulty   SKIN: no obvious rash  LINES: clean, dry intact         Data:   All data and imaging reviewed     ROUTINE ICU LABS (Last four results)  CMP    Recent Labs  Lab 01/26/18  0445 01/25/18  0435 01/24/18  1855 01/24/18  1030 01/24/18  0612 01/23/18  2327 01/23/18  0610     --   --  143 141  --  147*   POTASSIUM 3.6 3.7 3.9 3.3* 3.6 2.9* 3.4   CHLORIDE 108  --   --  109 109  --  114*   CO2 25  --   --   24 26  --  25   ANIONGAP 9  --   --  10 6  --  8   *  --   --  101* 111*  --  89   BUN 13  --   --  10 11  --  24   CR 0.55* 0.55*  --  0.52* 0.53*  --  0.89   GFRESTIMATED >90 >90  --  >90 >90  --  86   GFRESTBLACK >90 >90  --  >90 >90  --  >90   HEATH 8.2*  --   --  7.8* 8.1*  --  7.4*   MAG  --  2.2  Canceled, Test credited  --   --   --  2.3  --    PHOS  --  2.8  Canceled, Test credited  --  2.3*  --  1.7*  --    PROTTOTAL 6.6*  --   --  6.5* 6.6*  --  6.0*   ALBUMIN 2.3*  --   --  2.4* 2.3*  --  2.2*   BILITOTAL 0.6  --   --  0.7 0.5  --  0.4   ALKPHOS 127  --   --  111 114  --  100   AST 32  --   --  76* 98*  --  245*   *  --   --  339* 366*  --  515*     CBC    Recent Labs  Lab 01/25/18  0435 01/24/18  0612 01/23/18  0610 01/22/18  1310   WBC 15.8* 19.6* 20.1* 18.2*   RBC 4.59 4.58 4.39* 4.73   HGB 14.3 14.3 13.7 15.0   HCT 41.2 40.9 39.5* 43.3   MCV 90 89 90 92   MCH 31.2 31.2 31.2 31.7   MCHC 34.7 35.0 34.7 34.6   RDW 15.3* 15.5* 15.5* 15.2*    247 245 276  Canceled, Test credited     INR    Recent Labs  Lab 01/24/18  0612 01/23/18  0610 01/22/18  0848   INR 1.17* 1.42* 1.08     Arterial Blood Gas    Recent Labs  Lab 01/24/18  0535 01/22/18  1450 01/22/18  1020   PH 7.45 7.40 7.27*   PCO2 37 39 48*   PO2 124* 85 210*   HCO3 26 24 22   O2PER 40 40% VENT 80%       All cultures:    Recent Labs  Lab 01/22/18  1310 01/22/18  1255 01/22/18  1015 01/22/18  0952 01/22/18  0910 01/22/18  0848   CULT No growth after 4 days No growth after 4 days No growth No growth after 4 days Moderate growthNormal rivka  Heavy growthMoraxella (Branhamella) catarrhalisBeta lactamase positive* No growth after 4 days     No results found for this or any previous visit (from the past 24 hour(s)).    ECHO  Interpretation Summary  -Left ventricular systolic function is moderately reduced.  -The visual ejection fraction is estimated at 35-40%.  -There is moderate global hypokinesia of the left ventricle.  -Septal  and inferior segments are more hypokinetic.  -There is moderate to severe eccentric left ventricular hypertrophy.  -The study was technically difficult. There is no comparison study available.  Interpretation per Dr. Tay.       Billing: This patient is critically ill: No L3

## 2018-01-26 NOTE — PLAN OF CARE
Problem: Patient Care Overview  Goal: Plan of Care/Patient Progress Review  OT and PT: Discussed with RN, pt not appropriate for therapies today.

## 2018-01-26 NOTE — PROGRESS NOTES
ELI    I:  SW received call from ICU staff wondering the parameters for TCU or LTC placement for patients on high dose Methadone (for addiction).  Per Lead SW, depending on which Methadone clinic the patient receives the drug from AND if they allow the Methadone to be picked up by someone other than the patient, there may be a potential for placement into a LTC or TCU (if applicable) facility.  IF PATIENT has restrictions on being the ONLY person who can  the Methadone, placement would be extremely difficult to non existent.  Patient would need to discharge to home in that case.     P:  SW continue to follow, if necessary.    DINAH Awan

## 2018-01-27 NOTE — PROVIDER NOTIFICATION
FYI: Pt has increased resps 36/min and remains tachycardic. Would you like ABG? please advise.    Dr. Dudley aware of increase resps. Ordered ABG's CXR and morphine 1-2 mg IV.RO withdrawal symptoms.

## 2018-01-27 NOTE — PLAN OF CARE
Problem: Patient Care Overview  Goal: Plan of Care/Patient Progress Review  SLP: Attempted to see for swallow treatment, patient had FT placed today and suggested holding tx for today. Not tolerating his own secretions. Will reschedule for 1/28/18.

## 2018-01-27 NOTE — CONSULTS
Consult Date:  01/26/2018      REQUESTING PROVIDER:  ICU.      REASON FOR CONSULTATION:  Transfer of care.      HISTORY OF PRESENT ILLNESS:  Joni Muñoz is a 67-year-old male with a past medical history significant for chronic pain on methadone, polysubstance abuse, depression and tobacco use who presented to the Emergency Department on 01/22/2018 after being found in his home unresponsive by family members.  Per family, they went to check on him the day before presentation and found him to be minimally responsive.  They assumed he was sleeping, so they left him alone.  They returned again the following morning to check on him and found him in a similar state.  They became concerned and contacted 911.  When EMS arrived, the patient's O2 sats were in the 70s and he sounded congested and wheezy.  He overall remained unresponsive.  The patient was intubated in the Emergency Department on arrival for airway protection.  ABG showed hypercarbia and lactate was elevated at 2.6.  Remainder of workup was notable for shock liver, acute kidney injury, leukocytosis and chest x-ray showing possible left-sided infiltrate.  The patient was started on broad spectrum antibiotics and transferred to the ICU for ongoing cares.  There was some question initially whether the patient may have overdosed on his methadone as this has reportedly happened in the past per family.  Upon arrival to the ICU, echocardiogram was obtained, which showed presumably new cardiomyopathy with reduced ejection fraction of 35-40% and moderate global hypokinesis.  Troponin was elevated at 0.64.  This was thought to likely be a type 2 NSTEMI due to demand in the setting of severe sepsis.  The following day, he was noted to have clonus on exam prompting a Neurology consult.  He underwent MRI of the cervical spine and brain.  C-spine imaging was unremarkable.  However, brain MRI showed bihemispheric diffuse restrictions, likely resulting from hypoxia.  In the  following days, the patient did become slightly more responsive, was able to follow some commands and occasionally communicate with the thumbs up signal.  His methadone clinic was contacted and recommended resuming his prior to admission methadone at half dose.  This was started on 01/25.  The same day, the patient was also successfully extubated and palliative care requested to discuss goals of care in light of the patient's anoxic brain injury with significant deficits and unclear prognosis.  As the patient remained stable post-extubation, Hospitalist Service is consulted today for transfer of care.      The patient is presently evaluated in his room on the neuro floor.  During my exam, he does open his eyes.  He tracks when spoken to and does follow some commands.  It is clear that he is trying to speak and unable to do so.  Occasionally, he is able to do a thumbs up for me, but overall, ability to communicate is still quite minimal.  The history is limited.      REVIEW OF SYSTEMS:  Limited in light of the patient's difficulty with communication.      PAST MEDICAL HISTORY:   1.  Chronic back pain.  The patient follows with methadone clinic.  Per report of family has a history of prior overdose.   2.  Polysubstance abuse.  He is a known cocaine user and urine drug screen is notably positive for cocaine.   3.  Tobacco use.   4.  Depression.      PAST SURGICAL HISTORY:   1.  Orthopedic surgery.   2.  Hernia repair.   3.  Back surgery.      ALLERGIES:  ACETAMINOPHEN, NAUSEA.      SOCIAL HISTORY:  The patient is reportedly an everyday smoker and is single.  He lives with his niece.      FAMILY HISTORY:  Reviewed in chart.  Father had a history of cerebrovascular disease.      LABORATORY EVALUATION:  CMP today is notable for albumin of 2.3, total protein 6.6, normal alk phos and AST with an elevated ALT of 156, electrolytes are within normal limits.  CBC from 01/25 shows white count of 15.8, hemoglobin 14.3, hematocrit  41.2, platelets 212 with left shift.        PHYSICAL EXAMINATION:   VITAL SIGNS:  Temperature 99.1, heart rate 95, blood pressure 154/72, respiratory rate is 27, oxygen saturation is 94% on 6 liters.   GENERAL:  The patient awakens to voice, appears comfortable.  He remains alert during my visit.  He does track the person in the room who is talking.   EYES:  Pupils are equal and reactive to light, EOMI.   ENT:  Mucous membranes appear moist.   CARDIOVASCULAR:  Regular rate and rhythm, no murmur.   RESPIRATORY:  Lungs with occasional coarse sounds, otherwise clear.  No increased work of breathing.   ABDOMEN:  Positive bowel sounds, soft, no apparent tenderness with palpation.   EXTREMITIES:  Warm and appear well perfused.  No significant lower extremity edema.  There are +2 dorsalis pedis pulses bilaterally.   NEUROLOGIC:  Again, the patient is alert, clearly trying to communicate but unable to produce any speech during my visit.  He follows some commands including wiggling his toes, squeezing my fingers on the left.   strength is very minimal on the right.      ASSESSMENT:  Joni Muñoz is a 67-year-old male with a history of chronic back pain on methadone, polysubstance abuse, depression and tobacco use who presented to the Emergency Department on 01/22/2018 after being found by family unresponsive.  He was intubated for airway protection and admitted to the care of the intensivists for severe sepsis secondary to pneumonia and encephalopathy.  Hospitalist Service was requested today for transfer of care out of the ICU.   1.  Acute encephalopathy, somewhat improving.  Imaging and overall clinical picture concerning for anoxic brain injury.  He has significant deficits at this time.  He is alert and clearly attempting to communicate, but speech is extremely minimal and he is only able to follow some commands due to motor deficits.  Suspect likely methadone overdose.  Imaging also notes he likely had a history of  prior cerebrovascular accidents.  Neurology previously following and has signed off 01/24.  Recommend continuing supportive cares and therapies.  Suspect some level of disability, though the extent of this is not clear at this time.  Palliative care has been consulted.  The patient discussed extensively with Angela Kim this afternoon who held a care conference with the patient's sister (power of ), niece and niece's fiance.  Overall, family would like to give the patient a chance to rehabilitate and see how he does rather than moving towards a hospice approach at this time.  We discussed code status at length with them today.  The patient's sister would like him to remain a full code for now.  Continue physical therapy, occupational therapy.  Suspect the patient will need acute rehabilitation unit at discharge as he has significant care needs.  Per recommendation of his methadone clinic, a half dose of PTA methadone was resumed while in the ICU and will be continued.  The patient was evaluated by Speech today and failed his swallow study.  In light of need for methadone and anticipated nutritional needs, we will place NG tonight.  We will discuss with the patient's family that he will likely require PEG placement in the future for ongoing nutrition.  We will likely address this on Monday.   2.  Severe sepsis secondary to community-acquired pneumonia.  The patient initially presented with a lactate greater than 2, leukocytosis and signs of end-organ damage.  Suspect community-acquired pneumonia as source in light of infiltrate seen on chest x-ray.  He was started on broad spectrum antibiotics, which were changed to azithromycin and Rocephin once the patient arrived in the ICU.  As above, he was initially intubated for airway protection and has been extubated onto 5-6 liters of supplemental oxygen at this time.  Last fever documented 01/25/2018 at 2200 hours of 100.4.  The patient's blood pressure has been on  the hypertensive side.  Last white blood cell count on 01/25 was 15.8.  Continue ceftriaxone.  Today is day 6 of 7.  Wean oxygen as able.  Repeat CBC in the morning.     3.  Acute hypoxic and hypercarbic respiratory failure in the setting of suspected methadone overdose, community-acquired pneumonia and possible underlying chronic obstructive pulmonary disease.  The patient has been extubated and is maintaining appropriate saturations on 5-6 liters of supplemental oxygen at this time.  He is continuing to require suctioning throughout today.  Continue Rocephin as above.  Scheduled nebulizers q.4 hours.  Wean oxygen as able.  Continue QVAR.  Nursing to suction.   4.  New cardiomyopathy, suspected type 2 non-ST segment elevation myocardial infarction in the setting of sepsis.  An echocardiogram on 01/22/2018 showed a reduced ejection fraction of 35-40% with moderate global left ventricular hypokinesis, thought likely secondary to demand in the setting of the patient's acute illness.  Cardiology consult was considered but postponed until the patient stabilized.  Troponin peak was 0.6.  May consider curbside in Cardiology prior to discharge.  Suspect a workup would be pursued potentially later on when the patient is further stabilized and goals of care are clear.  The patient started on Coreg 6.25 mg b.i.d. by intensivist.  We will continue with hold parameters.   5.  Hypertension.  Continue Coreg as above and p.r.n. hydralazine is also available if needed.   6.  Acute kidney injury, resolved.  Avoid nephrotoxic agents.  BMP in the a.m.   7.  Shock liver, improving.  LFTs significantly elevated initially in the setting of severe sepsis, suspected shock liver.  These have improved with fluid resuscitation and treatment of infection.  A gastrointestinal source is not suspected.  CMP in the morning.     8.  Stress hyperglycemia.  The patient is not a known diabetic.  Blood sugars are under good control today.  Continue q.4  hours sliding scale insulin for now.   9.  Fluids, electrolytes and nutrition.  The patient briefly received tube feeds  through  while in the ICU.  His NG tube was removed when he was extubated with plans for a speech evaluation.  Unfortunately, the patient did not do well with speech and they recommended he continue to be n.p.o.  Discussed with Angela with Palliative Care who has spoken with the family.  For tonight, we will place NG for administration of his methadone.  He will likely need placement of a PEG tube in the coming days.  We will ask Nutrition to reinitiate tube feeds in the morning and for now, we will continue normal saline at 100 mL per hour, cautiously in the setting of his reduced ejection fraction.      CODE STATUS:  The patient is full code.  Again, Palliative Care is following along and this was discussed with the family today at length who elects to continue with current code status.      DISPOSITION:  Anticipate the patient will require an ARU at discharge.  This may be difficult in the setting of his methadone use.  Social Work is following.      The patient was seen and examined with Dr. Chase Vincent who agrees with the above plan.         CHASE VINCENT MD       As dictated by LAZARO RICHARDS PA-C            D: 2018   T: 2018   MT: DARSHAN      Name:     GUADALUPE JEWELL   MRN:      [id number]        Account:       [id number]   :      1950           Consult Date:  2018      Document: [id number]

## 2018-01-27 NOTE — PROGRESS NOTES
[facility]  Hospitalist Progress Note    Assessment & Plan   Joni Muñoz is a 67 year old male with chronic back pain and use of methadone, history of opiate overdose, polysubstance abuse including cocaine, tobacco abuse, depression who was admitted on 1/22/2018 after being found unresponsive by family.  Intubated for airway protection and remained in the intensive care unit through 1/26/18.  Treating for acute hypoxic and hypercarbic respiratory failure, anoxic brain injury, acute encephalopathy, severe sepsis secondary to community acquired versus aspiration pneumonia, new diagnosis cardiomyopathy.  Hospital day #7.    Acute hypoxic and hypercarbic respiratory failure  Severe sepsis secondary to community-acquired pneumonia  Methadone overdose  COPD, probable  Initiated on broad-spectrum antibiotics on admission.  Presented with elevated lactate, leukocytosis and profound endorgan damage.  -Continue ceftriaxone  -Wean oxygen as tolerated.  Currently on 6 L by nasal cannula or mask.  -Continue suctioning throughout the day as needed  -Scheduled nebulizer treatments while awake  -Continue to update family and clarify goals of care.    Acute encephalopathy secondary to anoxic brain injury  Brain imaging reveals probable prior small strokes.  Long-standing history of polysubstance abuse so unclear how much executive functioning will be recoverable.  -Treat underlying infection and continue supportive care    Dysphasia  Unable to safely swallow and needs supplemental nutrition.  Had an NJ tube while in the intensive care unit which was discontinued after extubation.  -NJ tube placement in radiology 1/27/2018  -Isosource 1.5 at 60 mL/hr    Polysubstance abuse  Chronic low back pain  On chronic methadone.  History of overdoses.  Urinary tox screen positive for cocaine on admission.  Showing some withdrawal signs and symptoms on 1/27/18.  -Continue supportive care with methadone maintenance    New  diagnosis cardiomyopathy  Suspected type II non-ST segment elevation MI  Hypertension  In the setting of sepsis and unknown period of anoxia prior to admission.  Peak troponin 0 0.6.  -TTE on 1/22/18 showed reduced EF of 35-40% with moderate global left ventricular hypokinesis.  -Have not obtained cardiology consultation due to unclear goals of care at this time and to allow recovery from respiratory failure  -Continue Coreg twice daily and as needed hydralazine    Stress hyperglycemia  Not known to be diabetic prior to admission  -Continue glucose checks with corrective dose aspart insulin    Acute kidney injury, resolved  Presumed due to hypotension and acute tubular necrosis.  -Monitor trend and avoid nephrotoxic medications    Shock liver, improving  LFTs significantly elevated on admission in the setting of severe sepsis.  -Monitor trend and continue supportive care      Active Diet Order      NPO for Medical/Clinical Reasons Except for: NPO but receiving Tube Feeding    DVT prophylaxis: Pneumatic Compression Devices  Code Status: Full Code.  Long discussion with the patient's nephew, Raymon, on 1/27/18 who indicates the patient would want to be DNR/DNI and would not want further aggressive medical intervention.  Indicates that he would like to consider hospice care at this time.  They will discuss amongst the family today and revisit the care plan going forward.  Overall prognosis is very poor.    Disposition: Expected discharge to TCU versus hospice type care.  Discussed with .    Leisa Elkins MD  [phone] (7am - 6pm)  Text Page  ~~~~~~~~~~~~~~~~~~~~~~~~~~~~~~~~~~~~~~~~~~~~~~~  Interval History   Patient transferred out of intensive care unit yesterday.  Chart reviewed.  Updated the patient's nephew, Raymon, at the bedside.  He indicates the patient would not want aggressive treatment or to live in a compromised physical state where he cannot enjoy his independent life and care for his  dog.    Appears uncomfortable with an increased respiratory rate this morning.  He is also somewhat diaphoretic.  Expediting NG tube placement to continue methadone.    -Data reviewed today: I reviewed all new labs and imaging results over the last 24 hours.     Physical Exam   Temp: 101.1  F (38.4  C) Temp src: Axillary BP: 154/87   Heart Rate: 113 Resp: (!) 34 SpO2: 94 % O2 Device: Oxymask Oxygen Delivery: 6 LPM  Vitals:    01/24/18 0547 01/25/18 0400 01/26/18 0600   Weight: 71.9 kg (158 lb 8.2 oz) 72 kg (158 lb 11.7 oz) 67.1 kg (147 lb 14.9 oz)     Vital Signs with Ranges  Temp:  [98.4  F (36.9  C)-101.1  F (38.4  C)] 101.1  F (38.4  C)  Heart Rate:  [] 113  Resp:  [20-36] 34  BP: (149-183)/() 154/87  SpO2:  [92 %-95 %] 94 %  I/O last 3 completed shifts:  In: 800 [I.V.:800]  Out: 1085 [Urine:1085]    Constitutional: Opens eyes but does not appear to understand conversation, moderate respiratory distress  HEENT: mmm, sclerae anicteric, oxygen mask in place  Respiratory: bilateral crackles and rhonchi, no wheezes  Cardiovascular: Tachycardic, no murmurs  Trace LE edema  GI: soft, non-tender, nondistended  Skin/Integument: warm, dry, no acute rashes  Musc: Not moving extremities  Neuro: Does not follow commands, bilateral clonus of lower extremities present    Medications     NaCl 100 mL/hr at 01/27/18 0241       fluticasone furoate  1 puff Inhalation QPM     DULoxetine  30 mg Oral BID     influenza quadrivalent (PF) vacc age 3 yrs and older  0.5 mL Intramuscular Prior to discharge     pantoprazole  40 mg Per Feeding Tube Daily     carvedilol  6.25 mg Oral or Feeding Tube BID w/meals     methadone  60 mg Oral Daily     insulin aspart  1-4 Units Subcutaneous Q4H     ipratropium - albuterol 0.5 mg/2.5 mg/3 mL  3 mL Nebulization Q4H     enoxaparin  40 mg Subcutaneous Q24H     cefTRIAXone  2 g Intravenous Q24H       Data     Recent Labs  Lab 01/27/18  0840 01/26/18  0445 01/25/18  0435  01/24/18  1030  01/24/18  0612  01/23/18  0610 01/22/18  1310 01/22/18  0848   WBC 13.6*  --  15.8*  --   --  19.6*  --  20.1* 18.2* 19.0*   HGB 15.2  --  14.3  --   --  14.3  --  13.7 15.0 17.1   MCV 87  --  90  --   --  89  --  90 92 92     --  214  --   --  247  --  245 276  Canceled, Test credited 283   INR  --   --   --   --   --  1.17*  --  1.42*  --  1.08   * 142  --   --  143 141  --  147* 144 140   POTASSIUM 3.6 3.6 3.7  < > 3.3* 3.6  < > 3.4 5.4* 4.4   CHLORIDE 113* 108  --   --  109 109  --  114* 110* 104   CO2 23 25  --   --  24 26  --  25 23 26   BUN 16 13  --   --  10 11  --  24 32* 34*   CR 0.61* 0.55* 0.55*  --  0.52* 0.53*  --  0.89 1.78*  Canceled, Test credited 1.94*   ANIONGAP 9 9  --   --  10 6  --  8 11 10   HEATH 8.3* 8.2*  --   --  7.8* 8.1*  --  7.4* 7.3* 8.2*   * 100*  --   --  101* 111*  --  89 100* 92   ALBUMIN 2.5* 2.3*  --   --  2.4* 2.3*  --  2.2* 2.5* 3.3*   PROTTOTAL 6.6* 6.6*  --   --  6.5* 6.6*  --  6.0* 6.5* 8.0   BILITOTAL 0.7 0.6  --   --  0.7 0.5  --  0.4 0.6 0.8   ALKPHOS 138 127  --   --  111 114  --  100 115 153*   * 156*  --   --  339* 366*  --  515* 708* 940*   AST 53* 32  --   --  76* 98*  --  245* 749* 1225*   LIPASE  --   --   --   --   --   --   --   --  48*  --    TROPI  --   --   --   --   --   --   --  0.208* 0.640*  --    < > = values in this interval not displayed.    Imaging:  Recent Results (from the past 24 hour(s))   XR Chest Port 1 View    Narrative    XR CHEST PORT 1 VW  1/27/2018 4:12 AM     INDICATION: Shortness of breath.    COMPARISON: 1/22/2018 at 0956 hours.      Impression    IMPRESSION: Mild atelectasis or infiltrate at the left base behind the  heart. Right lung is clear. Stable heart size near the upper limits of  normal. Endotracheal and enteric tubes have been removed.    KELLEY LEMOS MD   Total time 40 minutes, coordination of care greater than 50% of the time.  Extensive chart review, lengthy discussion with the patient's  family, clarifying feeding plan with nurse, communicating with social work and care coordinator.

## 2018-01-27 NOTE — PLAN OF CARE
Problem: Patient Care Overview  Goal: Plan of Care/Patient Progress Review  Outcome: No Change  Patient here with encephalopathy. A&O to person. Neuros include right droop, expressive aphasia, rt hemiparesis. VSS with bouts of HTN; given prn hydralazine. NPO. Up with A2 + lift. Denies pain. Continue to monitor and follow POC.

## 2018-01-27 NOTE — PROGRESS NOTES
Michael Progress Note  Chart Reviewed, Pt discussed in Interdisciplinary Rounds.   Hospitalist met with pt and pt's nephew today to discuss care plan.  Nephew, Raymon, indicated to hospitalist that they would like to pursue hospice care on behalf of pt.  Raymon indicates that he will discuss with family looking into hospice/comfort care as new plan.  Intervention:   MICHAEL obtained application for [facility] and placed on pts chart for physician to complete per request. Pt does not have the funding to pay privately for hospice care.  SW to fax application once complete to [facility] and follow up with Bhavya in admissions.    Team Members notified:   [place]    Plan: Anticipate moving to comfort care and anticipate discharge to facility based hospice.  Anticipated discharge placement: hospice care in facility. Application being made to [facility]  Follow up plan: Confirm plan and placement with family. Obtain LTC/hospice bed.      DINAH Hernandez  [facility]  Phone: [phone]

## 2018-01-27 NOTE — PROGRESS NOTES
Paged by RN as NG tube could not be placed this evening. Ok to hold evening dose of Coreg. PRNs available. In regards to methadone, pt has been receiving 60 mg, last dose 0900 on 1/25. Discussed case with pharmacist. At this time, will hold dose this evening. Monitor for signs of withdrawal, supportive cares. NG tube to be placed right away in the morning with resumption of PTA oral meds. Order for IR placement of NG tube in place.

## 2018-01-27 NOTE — PLAN OF CARE
Problem: Patient Care Overview  Goal: Plan of Care/Patient Progress Review  OT/PT: Attempted eval; however, pt leaving room for feeding tube placement. RN in agreement to wait with OT/PT at this time and attempt this afternoon as able.

## 2018-01-27 NOTE — PLAN OF CARE
Problem: Patient Care Overview  Goal: Plan of Care/Patient Progress Review  Pt is alert to self only. Pt not able to speak or follow most commands. Pt able to squeeze hands. Pt has labored breathing and respirations are elevated. Pt's SATs on 6L O2  In mid to low 90's. Pt suctioned several times through the NOC with creamy thick sputum. Pt's LS rhonchi in bilateral upper lobes.respirations increased to the mid 30's MD notified and duoneb given. ABG's ordered, CXR and IV morphine. ABG's showed respiratory alkalosis uncompensated and CXR was similar to previous. Pt has been tachycardic through the night Tele ST. NPO. Pt has been turned and repositioned throughout the NOC.Pt does not exhibit nonverbal signs of pain. Plan to have meeting with family to discuss POC and have NG tube placed.

## 2018-01-28 NOTE — PLAN OF CARE
Problem: Patient Care Overview  Goal: Plan of Care/Patient Progress Review  SLP: Per patient's nurse not responding and currently on simple face mask not appropriate.

## 2018-01-28 NOTE — PROGRESS NOTES
Nebs given as ordered. LS are diminished and coarse t/o and pt is on 5L oxymask. Will continue to follow.  Scott Dorsey

## 2018-01-28 NOTE — PROGRESS NOTES
Cross cover    Contacted by RN due to questions regarding fluids.  Patient is receiving tube feeds continuous at 60 ml/hr with free water bolus after medications along with normal saline IV at 100 ml/hr.  Patient with increased secretions.  Hx CHF with reduced EF.  - Will decrease IV fluids to 50 ml/hr  - Monitor volume status    Solo Isaac PA-C

## 2018-01-28 NOTE — PLAN OF CARE
Problem: Patient Care Overview  Goal: Plan of Care/Patient Progress Review  Outcome: No Change  Patient here with encephalopathy. A&O to person. Neuros include right droop, expressive aphasia, rt hemiparesis. VSS with bouts of HTN; given prn hydralazine. NPO/ tube feed at goal rate of 60 mls/hr. Up with A2 + lift. IV morphine given for pain/withdrawal symptoms from missing 2 doses methadone; methadone given once NJ tube was placed. Continue to monitor and follow POC.

## 2018-01-28 NOTE — PLAN OF CARE
Problem: Patient Care Overview  Goal: Plan of Care/Patient Progress Review  Outcome: No Change  Acute encephalopathy. ANDREW orientation, nonverbal this shift, not following commands. Tele NSR. Nonverbal signs indicate no pain. Elevated BP within parameters, sating mid 90s on 5-6L oxymask, temp 101.7 tylenol given, last temp 98.5, otherwise VSS. RR 20-25, shallow breathing, scheduled NEB treatments, freq oral care. Diaphoretic at times.  NPO, TF going at goal rate 60mL/hr with 60mL q4hrs free h20 flushes. BLE +2 edema, ANDREW CMS. R facial droop, RUE/RLE hemiparesis, unable to complete Neuro assessment d/t patient not following commands. T&R q2hrs, A2, Lift. Herman patent, adequate output. Incont of stool. Na+ 150, MD notified and aware. TCU vs LTC/hospice pending, family needs to decide on direction of POC. Nrsg will continue to monitor.

## 2018-01-28 NOTE — PLAN OF CARE
Problem: Patient Care Overview  Goal: Plan of Care/Patient Progress Review  OT/PT: Per discussion with SW, most appropriate to hold therapy evals this date until goals of care determined. Family considering possible hospice/comfort care.

## 2018-01-28 NOTE — PLAN OF CARE
Problem: Patient Care Overview  Goal: Plan of Care/Patient Progress Review  Disoriented to time, place, and situation. Neuros include R droop and expressive aphasia d/t hemiparesis. Pt is unable to speak and follow some of the commands. VSS except for shallow and labored breathing with tachypnea. Suctioned thick creamy secretions.Tele is NSR. NPO with NJ tube feeding. Up with assist of 2, with lift. Incontinent of stool x1 this shift. Does not show any signs of pain. Had a 101 fever decreased with Tylenol PRN. Continue to monitor.

## 2018-01-28 NOTE — PROGRESS NOTES
[facility]  Hospitalist Progress Note    Assessment & Plan   Joni Muñoz is a 67 year old male with chronic back pain and use of methadone, history of opiate overdose, polysubstance abuse including cocaine, tobacco abuse, depression who was admitted on 1/22/2018 after being found unresponsive by family.  Intubated for airway protection and remained in the intensive care unit through 1/26/18.  Treating for acute hypoxic and hypercarbic respiratory failure, anoxic brain injury, acute encephalopathy, severe sepsis secondary to community acquired versus aspiration pneumonia, new diagnosis cardiomyopathy.  Hospital day #8.    Acute hypoxic and hypercarbic respiratory failure  Severe sepsis secondary to community-acquired pneumonia  Methadone overdose  COPD, probable  Initiated on broad-spectrum antibiotics on admission.  Presented with elevated lactate, leukocytosis and profound endorgan damage.  -Continue ceftriaxone. Started broad spectrum antibiotics on admission (1/22) so final dose will be 1/29 to complete 7 day course.   -Wean oxygen as tolerated.  Currently on 6 L by nasal cannula or mask.  -Continue suctioning throughout the day as needed.  -Scheduled nebulizer treatments while awake.  -Continue to update family and clarify goals of care.     Acute encephalopathy secondary to anoxic brain injury  Brain imaging reveals probable prior small strokes.  Long-standing history of polysubstance abuse so unclear how much executive functioning will be recoverable.  -Treat underlying infection and continue supportive care     Dysphasia  Hypernatremia, dehydration  Unable to safely swallow and needs supplemental nutrition.  Had an NJ tube while in the intensive care unit which was discontinued after extubation.  -NJ tube placement in radiology 1/27/2018  -Isosource 1.5 at 60 mL/hr with free water flushes     Polysubstance abuse  Chronic low back pain  On chronic methadone. History of overdoses. Urinary  tox screen positive for cocaine on admission. Showing some withdrawal signs and symptoms on 1/27/18.  -Continue supportive care with methadone maintenance     New diagnosis cardiomyopathy  Suspected type II non-ST segment elevation MI  Hypertension  Sinus tachycardia  In the setting of sepsis and unknown period of anoxia prior to admission.  Peak troponin 0 0.6.  -TTE on 1/22/18 showed reduced EF of 35-40% with moderate global left ventricular hypokinesis.  -Have not obtained cardiology consultation due to unclear goals of care at this time and to allow potential recovery from respiratory failure  -Increase Coreg to 12.5 mg twice daily on 1/28/2018  -as needed hydralazine     Stress hyperglycemia  Not known to be diabetic prior to admission  -Continue glucose checks with corrective dose aspart insulin     Acute kidney injury, resolved  Presumed due to hypotension and acute tubular necrosis.  -Monitor trend and avoid nephrotoxic medications     Shock liver, improving  LFTs significantly elevated on admission in the setting of severe sepsis.  -Monitor trend and continue supportive care    Active Diet Order      NPO for Medical/Clinical Reasons Except for: NPO but receiving Tube Feeding    DVT prophylaxis: Pneumatic Compression Devices  Code Status: Full Code Long discussion with the patient's nephew, Raymon, on 1/27/18 who indicates the patient would want to be DNR/DNI and would not want further aggressive medical intervention.  Indicates that he would like to consider hospice care at this time.  They will discuss amongst the family today and revisit the care plan going forward.  Overall prognosis is very poor as patient continues to have minimal improvement in cognition and has continued respiratory decline. If he were to have a cardiorespiratory arrest, predict an extremely poor chance for successful ACLS and he would most certainly be harmed (pain, suffering) by such interventions.     Disposition: Expected  "discharge to LTC with hospice vs TCU. I recommend comfort care and hospice enrollment but need to confirm with family consensus on Monday.     Leisa Elkins MD  [phone] (7am - 6pm)  Text Page  ~~~~~~~~~~~~~~~~~~~~~~~~~~~~~~~~~~~~~~~~~~~~~~~  Interval History   Sodium increased to 150 today. Unable to keep up with free water needs. Continues to clinically decline. Increased suctioning needs. No family in room at the time of my visit.     I remain very concerned that this patient continues at FULL CODE status, despite his previously stated \"no heroic measures\" goal of care with nephew. He would be unlikely to survive an in-hospital arrest and even less likely to ever be weaned off a ventilator if he were intubated again.      -Data reviewed today: I reviewed all new labs and imaging results over the last 24 hours.     Physical Exam   Temp: 99.8  F (37.7  C) Temp src: Oral BP: (!) 168/99   Heart Rate: 90 Resp: 28 SpO2: 94 % O2 Device: Oxymask Oxygen Delivery: 6 LPM  Vitals:    01/24/18 0547 01/25/18 0400 01/26/18 0600   Weight: 71.9 kg (158 lb 8.2 oz) 72 kg (158 lb 11.7 oz) 67.1 kg (147 lb 14.9 oz)     Vital Signs with Ranges  Temp:  [97.7  F (36.5  C)-101.1  F (38.4  C)] 99.8  F (37.7  C)  Heart Rate:  [] 90  Resp:  [24-40] 28  BP: (132-168)/(81-99) 168/99  SpO2:  [93 %-96 %] 94 %  I/O last 3 completed shifts:  In: 1852 [I.V.:1792]  Out: 1450 [Urine:1450]  Constitutional: Ill-appearing, Has eyes open but does not appear to track or understand conversation, moderate respiratory distress  HEENT: mmm, sclerae anicteric, oxygen mask in place  Respiratory: bilateral crackles and rhonchi, no wheezes  Cardiovascular: Tachycardic, no murmurs  Trace LE edema  GI: soft, non-tender, nondistended  Skin/Integument: diaphoretic, warm, dry, no acute rashes  Musc: Not moving extremities  Neuro: Does not follow commands   Medications     NaCl 50 mL/hr at 01/27/18 1852       DULoxetine  30 mg Per Feeding Tube BID     " methadone  60 mg Per Feeding Tube Daily     fluticasone furoate  1 puff Inhalation QPM     influenza quadrivalent (PF) vacc age 3 yrs and older  0.5 mL Intramuscular Prior to discharge     pantoprazole  40 mg Per Feeding Tube Daily     carvedilol  6.25 mg Oral or Feeding Tube BID w/meals     insulin aspart  1-4 Units Subcutaneous Q4H     ipratropium - albuterol 0.5 mg/2.5 mg/3 mL  3 mL Nebulization Q4H     enoxaparin  40 mg Subcutaneous Q24H     cefTRIAXone  2 g Intravenous Q24H       Data     Recent Labs  Lab 01/27/18  1712 01/27/18  0840 01/26/18  0445 01/25/18  0435  01/24/18  1030 01/24/18  0612  01/23/18  0610 01/22/18  1310 01/22/18  0848   WBC 13.6* 13.6*  --  15.8*  --   --  19.6*  --  20.1* 18.2* 19.0*   HGB 15.3 15.2  --  14.3  --   --  14.3  --  13.7 15.0 17.1   MCV 89 87  --  90  --   --  89  --  90 92 92    272  --  214  --   --  247  --  245 276  Canceled, Test credited 283   INR  --   --   --   --   --   --  1.17*  --  1.42*  --  1.08   NA  --  145* 142  --   --  143 141  --  147* 144 140   POTASSIUM  --  3.6 3.6 3.7  < > 3.3* 3.6  < > 3.4 5.4* 4.4   CHLORIDE  --  113* 108  --   --  109 109  --  114* 110* 104   CO2  --  23 25  --   --  24 26  --  25 23 26   BUN  --  16 13  --   --  10 11  --  24 32* 34*   CR  --  0.61* 0.55* 0.55*  --  0.52* 0.53*  --  0.89 1.78*  Canceled, Test credited 1.94*   ANIONGAP  --  9 9  --   --  10 6  --  8 11 10   HEATH  --  8.3* 8.2*  --   --  7.8* 8.1*  --  7.4* 7.3* 8.2*   GLC  --  105* 100*  --   --  101* 111*  --  89 100* 92   ALBUMIN  --  2.5* 2.3*  --   --  2.4* 2.3*  --  2.2* 2.5* 3.3*   PROTTOTAL  --  6.6* 6.6*  --   --  6.5* 6.6*  --  6.0* 6.5* 8.0   BILITOTAL  --  0.7 0.6  --   --  0.7 0.5  --  0.4 0.6 0.8   ALKPHOS  --  138 127  --   --  111 114  --  100 115 153*   ALT  --  118* 156*  --   --  339* 366*  --  515* 708* 940*   AST  --  53* 32  --   --  76* 98*  --  245* 749* 1225*   LIPASE  --   --   --   --   --   --   --   --   --  48*  --    TROPI  --    --   --   --   --   --   --   --  0.208* 0.640*  --    < > = values in this interval not displayed.    Imaging:  Recent Results (from the past 24 hour(s))   XR Feeding Tube Placement    Narrative    FEEDING TUBE PLACEMENT 1/27/2018 12:16 PM    HISTORY: Nutritional needs.    COMPARISON: None.    FINDINGS: 2.4 minutes of fluoroscopy were utilized for placement of a  feeding tube.  At the final position, the feeding tube tip was located  in the third portion of the duodenum.  8 mL of Omnipaque 240 contrast  was injected to confirm placement.  There were no complications of the  procedure.    SPOT IMAGES OR CINE RUNS: 2      Impression    IMPRESSION: Successful feeding tube placement.    AILYN ESPINAL MD

## 2018-01-28 NOTE — PROVIDER NOTIFICATION
Call to provider on call regarding IVF and TF - pt already unable to tolerate fluids with IVF, fx suctioning needed. PA will adjust IVF down accordingly.

## 2018-01-29 NOTE — PROGRESS NOTES
[facility]  Hospitalist Progress Note    Assessment & Plan   Joni Muoñz is a 67 year old male with chronic back pain and use of methadone, history of opiate overdose, polysubstance abuse including cocaine, tobacco abuse, depression who was admitted on 1/22/2018 after being found unresponsive by family.  Intubated for airway protection and remained in the intensive care unit through 1/26/18.  Treating for acute hypoxic and hypercarbic respiratory failure, anoxic brain injury, acute encephalopathy, severe sepsis secondary to community acquired versus aspiration pneumonia, new diagnosis cardiomyopathy.  Hospital day #9.    Acute hypoxic and hypercarbic respiratory failure  Severe sepsis secondary to community-acquired pneumonia  Methadone overdose  COPD, probable  Initiated on broad-spectrum antibiotics on admission.  Presented with elevated lactate, leukocytosis and profound endorgan damage.  -Ceftriaxone. Started broad spectrum antibiotics on admission (1/22). Final dose 1/29 to complete 7 day course.   -Wean oxygen as tolerated. Currently on 6 L by nasal cannula or mask.  -Continue suctioning throughout the day as needed.  -Scheduled nebulizer treatments while awake.  -Continue to update family and clarify goals of care.      Acute encephalopathy secondary to anoxic brain injury  Brain imaging reveals probable prior small strokes.  Long-standing history of polysubstance abuse so unclear how much executive functioning will be recoverable.  -Treat underlying infection and continue supportive care      Dysphasia  Hypernatremia, dehydration  Unable to safely swallow and needs supplemental nutrition. Had an NJ tube while in the intensive care unit which was discontinued after extubation.  -NJ tube placement in radiology 1/27/2018  -Isosource 1.5 at 60 mL/hr with free water flushes      Polysubstance abuse  Chronic low back pain  On chronic methadone. History of overdoses. Urinary tox screen positive  for cocaine on admission. Showing some withdrawal signs and symptoms on 1/27/18.  -Continue supportive care with methadone maintenance      New diagnosis cardiomyopathy  Suspected type II non-ST segment elevation MI  Hypertension  Sinus tachycardia  In the setting of sepsis and unknown period of anoxia prior to admission.  Peak troponin 0 0.6.  -TTE on 1/22/18 showed reduced EF of 35-40% with moderate global left ventricular hypokinesis.  -Have not obtained cardiology consultation due to unclear goals of care at this time and to allow potential recovery from respiratory failure  -Increased Coreg to 12.5 mg twice daily on 1/28/2018  -as needed hydralazine      Stress hyperglycemia  Not known to be diabetic prior to admission  -Continue glucose checks with corrective dose aspart insulin      Acute kidney injury, resolved  Presumed due to hypotension and acute tubular necrosis.  -Monitor trend and avoid nephrotoxic medications      Shock liver, improving  LFTs significantly elevated on admission in the setting of severe sepsis.  -Monitor trend and continue supportive care    Active Diet Order      NPO for Medical/Clinical Reasons Except for: NPO but receiving Tube Feeding    DVT prophylaxis: Pneumatic Compression Devices  Code Status: Full Code Long discussion with the patient's nephew, Raymon, on 1/27/18 who indicates the patient would want to be DNR/DNI and would not want further aggressive medical intervention.  Indicates that he would like to consider hospice care at this time.  They will discuss amongst the family today and revisit the care plan going forward.  Overall prognosis is very poor as patient continues to have minimal improvement in cognition and has continued respiratory decline. If he were to have a cardiorespiratory arrest, predict an extremely poor chance for successful ACLS and he would most certainly be harmed (pain, suffering) by such interventions.     1/29/2018: Discussed with Palliative Care  (Vianney Kim). There is family disagreement on plans, ranging from taking him home on hospice (cared for by niece & nephew) to LTC with hospice to TCU for rehabilitation (proposed by sister, Sonia). Care conference arranged for 10 AM on 1/30/2018. There is evidence that sister, Sonia, may not be honoring Joni's wishes and is substituting her own judgment to keep him full code. Niece (and significant other) appear to be better decision-makers in this case.     Disposition: Expected discharge to LTC with hospice vs TCU. I recommend comfort care and hospice enrollment but need to confirm with family consensus.     Leisa Elkins MD  [phone] (7am - 6pm)  Text Page  ~~~~~~~~~~~~~~~~~~~~~~~~~~~~~~~~~~~~~~~~~~~~~~~  Interval History   Clinically unchanged. No response to verbal questioning. Have not been able to wean off oxygen. Completing 7 day antibiotic course today.      No family present at the time of my visit. Discussed with SW, palliative care, charge nurse, bedside nurse.     -Data reviewed today: I reviewed all new labs and imaging results over the last 24 hours.    Physical Exam   Temp: 99.7  F (37.6  C) Temp src: Axillary BP: 134/87   Heart Rate: 90 Resp: 20 SpO2: 93 % O2 Device: Oxymask Oxygen Delivery: 5 LPM  Vitals:    01/24/18 0547 01/25/18 0400 01/26/18 0600   Weight: 71.9 kg (158 lb 8.2 oz) 72 kg (158 lb 11.7 oz) 67.1 kg (147 lb 14.9 oz)     Vital Signs with Ranges  Temp:  [97.6  F (36.4  C)-102.8  F (39.3  C)] 99.7  F (37.6  C)  Heart Rate:  [80-92] 90  Resp:  [20-26] 20  BP: (134-182)/() 134/87  SpO2:  [93 %-95 %] 93 %  I/O last 3 completed shifts:  In: 1388 [I.V.:878; NG/GT:510]  Out: 1900 [Urine:1900]  Constitutional: Ill-appearing, Has eyes open but does not appear to track or understand conversation, moderate respiratory distress  HEENT: mmm, sclerae anicteric, oxygen mask in place  Respiratory: bilateral crackles and rhonchi, no wheezes  Cardiovascular: Tachycardic, no  murmurs  Trace LE edema  GI: soft, non-tender, nondistended  Skin/Integument: diaphoretic, warm, dry, no acute rashes  Musc: Not moving extremities  Neuro: Does not follow commands   Medications     NaCl 50 mL/hr at 01/29/18 0817       carvedilol  12.5 mg Oral or Feeding Tube BID w/meals     DULoxetine  30 mg Per Feeding Tube BID     methadone  60 mg Per Feeding Tube Daily     fluticasone furoate  1 puff Inhalation QPM     influenza quadrivalent (PF) vacc age 3 yrs and older  0.5 mL Intramuscular Prior to discharge     pantoprazole  40 mg Per Feeding Tube Daily     insulin aspart  1-4 Units Subcutaneous Q4H     ipratropium - albuterol 0.5 mg/2.5 mg/3 mL  3 mL Nebulization Q4H     enoxaparin  40 mg Subcutaneous Q24H     cefTRIAXone  2 g Intravenous Q24H       Data     Recent Labs  Lab 01/28/18  0956 01/27/18  1712 01/27/18  0840 01/26/18  0445 01/25/18  0435  01/24/18  0612  01/23/18  0610 01/22/18  1310   WBC  --  13.6* 13.6*  --  15.8*  --  19.6*  --  20.1* 18.2*   HGB  --  15.3 15.2  --  14.3  --  14.3  --  13.7 15.0   MCV  --  89 87  --  90  --  89  --  90 92    267 272  --  214  --  247  --  245 276  Canceled, Test credited   INR  --   --   --   --   --   --  1.17*  --  1.42*  --    *  --  145* 142  --   < > 141  --  147* 144   POTASSIUM 3.7  --  3.6 3.6 3.7  < > 3.6  < > 3.4 5.4*   CHLORIDE 117*  --  113* 108  --   < > 109  --  114* 110*   CO2 28  --  23 25  --   < > 26  --  25 23   BUN 20  --  16 13  --   < > 11  --  24 32*   CR 0.61*  --  0.61* 0.55* 0.55*  < > 0.53*  --  0.89 1.78*  Canceled, Test credited   ANIONGAP 5  --  9 9  --   < > 6  --  8 11   HEATH 8.1*  --  8.3* 8.2*  --   < > 8.1*  --  7.4* 7.3*   *  --  105* 100*  --   < > 111*  --  89 100*   ALBUMIN  --   --  2.5* 2.3*  --   < > 2.3*  --  2.2* 2.5*   PROTTOTAL  --   --  6.6* 6.6*  --   < > 6.6*  --  6.0* 6.5*   BILITOTAL  --   --  0.7 0.6  --   < > 0.5  --  0.4 0.6   ALKPHOS  --   --  138 127  --   < > 114  --  100 115   ALT   --   --  118* 156*  --   < > 366*  --  515* 708*   AST  --   --  53* 32  --   < > 98*  --  245* 749*   LIPASE  --   --   --   --   --   --   --   --   --  48*   TROPI  --   --   --   --   --   --   --   --  0.208* 0.640*   < > = values in this interval not displayed.    Imaging:  No results found for this or any previous visit (from the past 24 hour(s)).  TT 40 min, cc 30 min. Repeated discussions about plan of care and medications, labs with bedside nurse, palliative care.

## 2018-01-29 NOTE — PROGRESS NOTES
CM    I:  MICHAEL continues to assist with discharge planning.  MICHAEL unable to reach [facility] Financial counselor to confirm Medicaid benefits for patient.  MICHAEL placed call to [facility] Team 140, Case # [id number], who confirmed MA is active, however is set to close on 1/31/18 due to need for re-verification paperwork that has been pending ([facility] is awaiting a [employer] statement from Dec.-Jan 8th).  Due to MA coverage, patient would not need to discharge to [facility], but could discharge to any LTC facility w/Hospice under Medicare.  MICHAEL will place call to decision maker sister to review discharge plan and also give sister the fax number to [facility] () to provide them with information requested to avoid closure 1/31/18.     P:  Continue to assist with discharge planning as needed.    DINAH Awan    UPDATE@1031:  MICHAEL placed call to sister, Sangeeta, who deferred SW to speak with her daughter, Nataliia.  Sister wants patient brought home with Hospice.  Nataliia stated she is not able to meet with [facility] today, but can meet tomorrow at 10 a.m., if possible.  MICHAEL will place call to Liaison to arrange.  Patient will need hospital bed and lift.  Nataliia stated she will be able to provide 24/7 assistance for patient along with her fiance', Raymon.  Nataliia and sister, Sangeeta, are in agreement NG tube will be removed prior to discharge.  MICHAEL will also place call to Methadone clinic () to confirm someone beyond patient can  his daily dose of Methadone    UPDATE@1200:  MICHAEL was provided an update from Palliative Care NP who stated she has spoken with patient's sister who is now not agreeing with the plan for Home with Hospice, due to concerns about patient becoming a DNR. NP also updated MICHAEL if patient discharges with Hospice, they would be able to address Methadone needs. Palliative NP plans to meet with patient, sister and niece (as well as niece's fiance') tomorrow to discuss options.   MICHAEL was asked to set up meeting with Hospice liaison for 11am.  MICHAEL spoke w/liaison on duty today who will try to arrange for 11 am meeting tomorrow.  Liaison will call MICHAEL back when confirmed.     UPDATE@6402:   Hospice liaison has been confirmed for 1/30 at 11AM. MICHAEL left  for Nataliia balderas, updating her of [facility]'s need for patient [employer] statement from Dec. - Jan.8 asap to keep patient's Medicaid from closing 1/31/18.  MICHAEL provided fax number to [facility] on  or offered to fax the document from [facility] tomorrow when they come in for meeting at 10am.

## 2018-01-29 NOTE — PLAN OF CARE
Problem: Goal/Outcome  Goal: Goal Outcome Summary  SLP: Patient continues to be on simple face mask and not responding to participate in treatment. Will cancel session and f/u one more day.

## 2018-01-29 NOTE — PLAN OF CARE
Problem: Patient Care Overview  Goal: Plan of Care/Patient Progress Review  Disoriented to time, place, and situation. Neuros include R droop and expressive aphasia d/t hemiparesis. Pt is unable to perform some of the commands. BP elevated 182/109. Given PRN Hydralazine. On 5L with oxymask, LS diminished with shallow breathing. Suctioned thick creamy secretions.Tele is NSR. NPO with NJ Tube feeding at 60 ml/hr with 60 mL flush Q4 hrs. Up with assist of 2, with lift. Had a 102.8 fever decreased with Tylenol PRN Last temp was 97.6. . Herman patent. T&R q 2 hrs. Incontinent of stool x1. Continue to monitor.

## 2018-01-29 NOTE — PROGRESS NOTES
Weekend events reviewed with Dr. Elkins. Family was to follow up with Dr. Elkins today in regard to further discussions of goals of care for Saurav. I had also left it with family on Friday that I would reach out to them again today. I called Nataliia and left a voicemail, will wait to see if she returns my call. Called Sonia who did not answer and does not have a voicemail option. Will follow.     Vianney ANDERSON, [facility]  Palliative Medicine   Pager: [phone]

## 2018-01-29 NOTE — PROGRESS NOTES
CLINICAL NUTRITION SERVICES - REASSESSMENT NOTE      Future/Additional Recommendations:   Once IVF d/c'd, need to increase H2O flushes to 175 mL q 4 hours for total fluid (TF + flushes) = 2150 mL/day     Malnutrition dx'd 1/26:   Severe malnutrition  In Context of:  Acute illness or injury  Environmental or social circumstances         EVALUATION OF PROGRESS TOWARD GOALS   Diet:  NPO    Nutrition Support:  ND feeding tube was placed 1/27 and TF resumed: Isosource 1.5 at 60 mL/hr:  2160 kcal (31 kcal/kg), 98 g protein (1.4 g/kg), 253 g CHO, 22 g fiber, 1094 mL H2O.  H2O flushes of 60 mL q 4 hours.     Intake/tolerance: No issues. Last documented BM was 1/25    Dosing Weight:  70.3 kg (admit wt)       ASSESSED NUTRITION NEEDS PER APPROVED PRACTICE GUIDELINES:  Estimated Final Energy Needs:  0689-6757 kcals (25-30 Kcal/Kg)  Justification: maintenance  Estimated Final Protein Needs:   grams protein (1.3-1.6 g pro/Kg)  Justification: repletion and hypercatabolism with critical illness       NEW FINDINGS:   NS IVF @ 50 mL/hr  Hypernatremia continues  Considering hospice    Vitals:    01/22/18 0849 01/22/18 1200 01/23/18 0500 01/24/18 0547   Weight: 74.6 kg (164 lb 7.4 oz) 70.3 kg (154 lb 15.7 oz) 74.7 kg (164 lb 10.9 oz) 71.9 kg (158 lb 8.2 oz)    01/25/18 0400 01/26/18 0600   Weight: 72 kg (158 lb 11.7 oz) 67.1 kg (147 lb 14.9 oz)         Previous Goals (1/26):   Patient will receive nutrition within the next 24-48 hours   Evaluation: Met    Previous Nutrition Diagnosis:   Inadequate protein-energy intake related to NPO, TF has been discontinued with extubation as evidenced by meeting 0% needs  Evaluation: Resolved      CURRENT NUTRITION DIAGNOSIS  No nutrition diagnosis identified at this time      INTERVENTIONS  Recommendations / Nutrition Prescription  Continue current TF  Once IVF d/c'd, increase H2O flushes to 175 ml q 4 hr for total of 2150 mL/day (TF + flushes).    Implementation  No  interventions    Goals  Isosource 1.5 @ 60 mL/hr will continue to meet assessed needs      MONITORING AND EVALUATION:  Progress towards goals will be monitored and evaluated per protocol and Practice Guidelines    Vanna Phillips RD  Pager [phone] (M-F)            [phone] (W/E & Hol)

## 2018-01-29 NOTE — PROGRESS NOTES
[facility]    Palliative Care Progress Note    Joni Muñoz  MRN# [id number]  Date of Admission:  1/22/2018  Date of Service (when I saw the patient): 01/29/2018    Assessment & Plan   Joni Muñoz is a 67 year old male with PMH significant for chronic back pain, polysubstance abuse (cocaine) on methadone maintenance therapy, past unintentional drug overdose, and recent dx PNA/bronchitis/influenza A who presents with AMS and acute hypoxic respiratory failure. He was intubated on admission and being treated for severe sepsis likely 2/2 PNA. He has now been successfully extubated. 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 We are following for goals of care.      Symptoms/Recommendations   -Care conference tomorrow at 1000 with sherrie William, her fiance Raymon, and pt's sister Sonia. Ideally the covering hospitalist is able to attend. I have requested a hospice consultation for after our meeting.      Support/Coping  -Sherrie William and her fiance Raymon are closest with pt. He is otherwise not  and has no children. He has 4 siblings, Sonia is the sister most involved, others are not reachable. One brother Adin visited last week, was offered to participate in a goals of care meeting, he declined. See further discussion below.       Decisional Support, Goals of Care, Counseling & Coordination  Decisional Capacity Intact?  -No  Health Care Directive on File?  -No. In the absence of a HCD, surrogate decision making defaults to adult sibling, Sonia, per next of kin policy. My assessment thus far is that I am concerned that Sonia may not be able to serve as a surrogate decision maker, given her lack of cooperation and emotional instability. See further discussion below.   Code Status/Resuscitation Preferences?  -Remains full code. Again reinforced with family today that I, along with  "medical team, recommend DNR/DNI     Discussion  Pt's niece Nataliia did return my phone call late this AM. Her mother, pt's sister Sonia, was on the phone as well. Our conversation was quite disjointed. Nataliia just spoke with SW and they are talking about bringing Saurav home. They are trying to figure out the logistics. To clarify, I confirmed that their intention would be to initiate hospice and end of life cares at home. Nataliia consented to this, but Sonia was in some disagreement.     I shared my concern with them that Saurav's condition is not improved over the weekend, and he is actually a bit more somnolent. I express concern that Saurav continues to have difficulty managing his own secretions, which could ultimately lead to ongoing aspiration and respiratory failure at any point. Nataliia talks about needing to set up suction equipment at home. I again clarified the purpose of hospice, and that we ultimately step away from treatments that might be distressing to Saurav, but rather focus on his breathing comfort with medication. This ultimately set Sonia off and she became quite emotionally labile. She stated, \"so you're just going to let him choke to death?\" Despite trying to reframe and explain, I felt that Sonia really was not processing or listening to what I had to say.     I ultimately started to feel some major discordance from Nataliia and Sonia as to their understanding of how we will be caring for Saurav, if he comes home. I do not feel comfortable that they have a good understanding of the hospice plan of care.     We talk about code status and I again recommended DNR/DNI, as I did on Friday, and as Dr. Elkins had been reviewing with them over the weekend. Nataliia was in complete agreement, while Sonia completely disagreed. I asked her questions as to why she felt code status should remain full, and she really would not comply with the ongoing dialogue. I told her I felt an obligation to follow what Saurav has told Raymon in " the past, not wanting to have those aggressive measures. As surrogate decision makers, that is our role, to help carry forward his voice. Sonia was quite flighty in our discussion and promptly changed the subject.     As the conversation unfolded, I started to feel that Sonia was not quite understanding/processing the information I was sharing. This is obviously difficult to fully assess over the phone. I understand she has medical issues, the extent of these issues I am not sure. In our conversation, I started to feel that Sonia may not in fact have the emotional capacity to serve as Saurav's surrogate decision maker. I encouraged her to consider deferring to Nataliia, who is able bodied to come to the hospital and work with staff on a care plan. Sonia declined this. I ultimately asked her, then, to come in to meet me and the medical team to further the discussion, as it was becoming more complicated the longer we spoke on the phone. I also have reservations that because she has not actually seen Saurav, she may not fully understand how serious his condition is. After much coaxing, Sonia did agree to come in to the hospital tomorrow at 1000 to meet me and the medical team to further the care discussion. Nataliia and Raymon will be present as well.     Sonia did ultimately share that she is concerned about finances and how hospice will impact this. Saurav does have MA, which was confirmed by family and SW, but he needs to submit a recent bank statement by the end of the month (2 days) in order to maintain this status. SW will work with sherrie William on this. Other options would be hospice at home vs [facility].     Case reviewed at length with Dr. Elkins and unit MICHAEL Calles. As above, I am quite concerned that Sonia does not have the emotional capacity to serve as Saurav's surrogate decision maker. If this is confirmed tomorrow, or if she continues to disagree with Rere, or if she does not show up for the meeting, I  plan to further discuss this with the ethics committee about how we should proceed.     Further discussion points are the pt's methadone and role for NJ.     Thank you for involving us in the care of this patient and family. We will continue to follow. Please do not hesitate to contact me with questions or concerns or the on-call provider for our team if evening or weekend.    Vianney ANDERSON, CNP  Palliative Medicine   Pager [phone]    Attestation:  Total time on the floor involved in the patient's care: 60 minutes  Total time spent in counseling/care coordination: >50%    Interval History   NJ was placed for initiation of TF and methadone administration. Pt remains largely obtunded with no meaningful engagement or participation in rehabilitation over the weekend. Remains on oxymask or O2 supplementation. Hypernatremic.     Medications   Current Facility-Administered Medications Ordered in Epic   Medication Dose Route Frequency Last Rate Last Dose     carvedilol (COREG) tablet 12.5 mg  12.5 mg Oral or Feeding Tube BID w/meals   12.5 mg at 01/29/18 0822     morphine (PF) injection 1-2 mg  1-2 mg Intravenous Q3H PRN   2 mg at 01/27/18 1127     acetaminophen (TYLENOL) solution 650 mg  650 mg Per Feeding Tube Q6H PRN   650 mg at 01/29/18 0822     DULoxetine (CYMBALTA) EC capsule 30 mg  30 mg Per Feeding Tube BID   30 mg at 01/29/18 0822     methadone (DOLOPHINE-INTENSOL) 10 MG/ML (HIGH CONC) solution 60 mg  60 mg Per Feeding Tube Daily   60 mg at 01/29/18 0954     fluticasone furoate (ARNUITY ELLIPTA) 100 MCG/ACT inhalation powder 1 puff  1 puff Inhalation QPM         influenza quadrivalent (PF) vacc age 3 yrs and older (FLUZONE or Flulaval) injection 0.5 mL  0.5 mL Intramuscular Prior to discharge         pantoprazole (PROTONIX) suspension 40 mg  40 mg Per Feeding Tube Daily   40 mg at 01/29/18 0822     labetalol (NORMODYNE/TRANDATE) injection 40 mg  40 mg Intravenous Q4H PRN   40 mg at 01/26/18 0331     0.9%  sodium chloride infusion   Intravenous Continuous 50 mL/hr at 01/29/18 0817       hydrALAZINE (APRESOLINE) injection 10 mg  10 mg Intravenous Q4H PRN   10 mg at 01/29/18 0121     glucose 40 % gel 15-30 g  15-30 g Oral Q15 Min PRN        Or     dextrose 50 % injection 25-50 mL  25-50 mL Intravenous Q15 Min PRN        Or     glucagon injection 1 mg  1 mg Subcutaneous Q15 Min PRN         naloxone (NARCAN) injection 0.1-0.4 mg  0.1-0.4 mg Intravenous Q2 Min PRN         insulin aspart (NovoLOG) inj (RAPID ACTING)  1-4 Units Subcutaneous Q4H   1 Units at 01/29/18 0507     ipratropium - albuterol 0.5 mg/2.5 mg/3 mL (DUONEB) neb solution 3 mL  3 mL Nebulization Q4H   3 mL at 01/29/18 1137     albuterol neb solution 2.5 mg  2.5 mg Nebulization Q2H PRN         enoxaparin (LOVENOX) injection 40 mg  40 mg Subcutaneous Q24H   40 mg at 01/28/18 1707     ondansetron (ZOFRAN-ODT) ODT tab 4 mg  4 mg Oral Q6H PRN        Or     ondansetron (ZOFRAN) injection 4 mg  4 mg Intravenous Q6H PRN         senna-docusate (SENOKOT-S;PERICOLACE) 8.6-50 MG per tablet 1 tablet  1 tablet Oral BID PRN        Or     senna-docusate (SENOKOT-S;PERICOLACE) 8.6-50 MG per tablet 2 tablet  2 tablet Oral BID PRN         potassium chloride SA (K-DUR/KLOR-CON M) CR tablet 20-40 mEq  20-40 mEq Oral Q2H PRN         potassium chloride (KLOR-CON) Packet 20-40 mEq  20-40 mEq Oral or Feeding Tube Q2H PRN   20 mEq at 01/24/18 1606     potassium chloride 10 mEq in 100 mL sterile water intermittent infusion (premix)  10 mEq Intravenous Q1H PRN         potassium chloride 10 mEq in 100 mL intermittent infusion with 10 mg lidocaine  10 mEq Intravenous Q1H PRN         magnesium sulfate 4 g in 100 mL sterile water (premade)  4 g Intravenous Q4H PRN         sodium phosphate 15 mmol in D5W intermittent infusion  15 mmol Intravenous Daily PRN   15 mmol at 01/24/18 1517     sodium phosphate 20 mmol in D5W intermittent infusion  20 mmol Intravenous Q6H PRN   20 mmol at 01/24/18  0144     sodium phosphate 25 mmol in D5W intermittent infusion  25 mmol Intravenous Q8H PRN         cefTRIAXone (ROCEPHIN) 2 g in 20 mL SWFI Premix Syringe  2 g Intravenous Q24H   2 g at 01/28/18 1329     No current [facility]-ordered outpatient prescriptions on file.       Physical Exam   Temp: 99.7  F (37.6  C) Temp src: Axillary BP: 134/87   Heart Rate: 90 Resp: 20 SpO2: 93 % O2 Device: Oxymask Oxygen Delivery: 5 LPM  Vitals:    01/24/18 0547 01/25/18 0400 01/26/18 0600   Weight: 71.9 kg (158 lb 8.2 oz) 72 kg (158 lb 11.7 oz) 67.1 kg (147 lb 14.9 oz)     CONSTITUTIONAL: Chronically ill elderly man seen sleeping in bed in NAD, did not attempt to wake him up. He appears calm and comfortable.  HEENT: NCAT  RESPIRATORY: NL respiratory effort on oxymask. Possible eriods of apnea noted.     Data   Results for orders placed or performed during the hospital encounter of 01/22/18 (from the past 24 hour(s))   Glucose by meter   Result Value Ref Range    Glucose 137 (H) 70 - 99 mg/dL   Glucose by meter   Result Value Ref Range    Glucose 122 (H) 70 - 99 mg/dL   Lactic acid level STAT   Result Value Ref Range    Lactic Acid 1.0 0.7 - 2.0 mmol/L   Glucose by meter   Result Value Ref Range    Glucose 123 (H) 70 - 99 mg/dL   Glucose by meter   Result Value Ref Range    Glucose 124 (H) 70 - 99 mg/dL   Glucose by meter   Result Value Ref Range    Glucose 143 (H) 70 - 99 mg/dL   Glucose by meter   Result Value Ref Range    Glucose 127 (H) 70 - 99 mg/dL   Magnesium   Result Value Ref Range    Magnesium 2.1 1.6 - 2.3 mg/dL   Phosphorus   Result Value Ref Range    Phosphorus 2.7 2.5 - 4.5 mg/dL   Prealbumin   Result Value Ref Range    Prealbumin 14 (L) 15 - 45 mg/dL   Basic metabolic panel   Result Value Ref Range    Sodium 148 (H) 133 - 144 mmol/L    Potassium 3.8 3.4 - 5.3 mmol/L    Chloride 113 (H) 94 - 109 mmol/L    Carbon Dioxide 31 20 - 32 mmol/L    Anion Gap 4 3 - 14 mmol/L    Glucose 131 (H) 70 - 99 mg/dL    Urea Nitrogen 19 7 - 30  mg/dL    Creatinine 0.60 (L) 0.66 - 1.25 mg/dL    GFR Estimate >90 >60 mL/min/1.7m2    GFR Estimate If Black >90 >60 mL/min/1.7m2    Calcium 7.9 (L) 8.5 - 10.1 mg/dL   WBC count   Result Value Ref Range    WBC 11.1 (H) 4.0 - 11.0 10e9/L

## 2018-01-29 NOTE — PLAN OF CARE
Problem: Patient Care Overview  Goal: Plan of Care/Patient Progress Review  Outcome: No Change  Pt is non verbal and is unable to follow commands. VSS on 5L oximask. Turned and repo q 2h. Tube feeing running at 60 with 60 ml free water flush given. Lactic acid fired and came back 1.0. Herman patent. LS dim NS @ 50ml. Tele NSR.

## 2018-01-29 NOTE — PLAN OF CARE
Problem: Patient Care Overview  Goal: Plan of Care/Patient Progress Review  PT/OT: Pt remains lethargic, obtunded. Per chart still non verbal and not following commands. Pt is not appropriate for therapies and per chart family is disjointed, potentially pursing hospice. If pt is to discharge, whether to a facility or home, given current level, would require total assist, lift for mobility, recommend hospital bed as well for positioning. Will complete orders at this time. If pt's mental status improves or able to increase command following for meaningful assessment, please re-order as needed.

## 2018-01-30 NOTE — PROGRESS NOTES
Patient made comfort care at 1100, TF stopped and NG tube pulled at 1215,  per order, IVF stopped, kept IV access for IV Robinal to manage secretions.

## 2018-01-30 NOTE — PLAN OF CARE
Problem: Patient Care Overview  Goal: Plan of Care/Patient Progress Review  Outcome: No Change  Patient is alert, non verbal does shake head yes/no to certain questions but not all. NPO- NG w/tube feeding @ 60ml/h w/flush 60ml q4h. Repositioned q2h. Herman in place. Blood sugar check q4h, no insulin required this shift. 6L oxymask, saturation mid 90s, but writer walked in room with oxygen mask slipped down and saturations 90-91%. Scheduled nebs given, crackles throughout lungs. Patient does make slight movement with arms. Able to squeeze writers hands slightly R>L. Unable to hold arms in air. Unable to lift legs but able to slightly push down on writers hands with feet when asked. Last neuro assessment around 0429, patient alert but not following commands to squeeze hands, or move feet, unable to do full neuro assessment. Patient was moving right arm spontaneously when writer was in room.  Right facial droop continues. Unable to assess vision, wasn't following pen light. Discharge pending meeting with family tomorrow at 11am.

## 2018-01-30 NOTE — PLAN OF CARE
Problem: Patient Care Overview  Goal: Plan of Care/Patient Progress Review  Outcome: No Change  Acute encephalopathy. Alert to self, verbalized no when if had pain with AM assessment, ANDREW orientation rest of the day, nonverbal, following very few commands. Tele NSR, then d/maria esther. Nonverbal signs indicate no pain. Sating low to mid 90s on 6L oxymask, low grade temp, Tylenol given, decreased, otherwise VSS. LS diminished, coarse, RR 20-22, shallow breathing, scheduled NEB treatments, freq oral care. Diaphoretic at times.  NPO, TF going at goal rate 60mL/hr with 60mL q4hrs free h20 flushes. BLE +2 edema, ANDREW CMS. R facial droop, RUE/RLE hemiparesis, unable to complete Neuro assessment d/t patient not following commands. T&R q2hrs, A2, repo sheet, Lift. Herman patent, adequate output. Incont of sm stool. Na+ 148. Discharge plan pending, home/hospice pending, [facility] planning to meet with family tomorrow at 1100, see SW note. Nrsg will continue to monitor.

## 2018-01-30 NOTE — PLAN OF CARE
Speech Language Therapy Discharge Summary    Reason for therapy discharge:    No further expectations of functional progress.  Patient/family request discontinuation of services.    Progress towards therapy goal(s). See goals on Care Plan in Epic electronic health record for goal details.  Goals not met.  Barriers to achieving goals:   Patient transitioning to pallative/hospice care and family requesting patient eat and drink despite aspiration..    Therapy recommendation(s):    No further therapy is recommended.

## 2018-01-30 NOTE — PROGRESS NOTES
CM    I:  SW was asked to attend Palliative meeting w/patient's sister and niece.  Sister stated she has accepted patient's condition and asks to proceed with finding placement for patient with hospice.  We discussed LTC bed vs [facility].  Sister asks for the following referrals to be sent:  #1-[facility][1]; #2-[facility]; #3-[facility] OR [facility].  SW placed call to [facility] who stated they have no male opening until Thursday or Friday 2/1-2/2.  As anticipated discharge date is prior to this, SW placed referrals to SNF's thru DOD.  Updated sister and niece.  Niece also brought patient's most current [place] statement that [facility] reported they are waiting for to continue patient's Medicaid (see SW note from 1/29).  SW made copy of document and faxed to Team 140 @ [phone].  SW attempted to call to Team 140 @  to update them as patient's MA is set to terminate 1/31/18, however, was on hold for 22 minutes, unable to continue on hold.  No VM. [facility] meeting is set for 11AM. Will close SW consult for Hospice.     P:  Continue to follow.    DINAH Awan    UPDATE@1283:  [facility] states they have no openings today and none anticipated for tomorrow. [facility] has no LTC beds on floor 2, but are checking on floor 3 and will call SW back.  [facility] has declined patient in EPIC. MICHAEL updated sister/niece who requested additional referrals be sent to:  [facility] (declined in [facility]); [facility] (No beds available; has a 6 month wait list) and [facility] [place] (no beds per [facility]), which was done DOD.  Family requests no direct [facility]  LTC referrals. (i.e. [facility], [facility])     UPDATE@5304:  Per Kristi in admissions at [facility], their Regional office was needed to review referral as patient has history of cocaine use (but were made aware patient is now unresponsive  and will arrive on Hospice).  Also per Kristi, the Regional office has declined taking patient.  MICHAEL sent additional referrals thru DOD to:  [facility] (per Sugar in admissions they have a LTC opening) and [facility].  Awaiting call backs.     UPDATE@8762:  Sugar continues to work on possible placement for patient at [facility].  MICHAEL confirmed w/bedside RN patient's NG tube has been removed.  Awaiting call back. Sister and niece will need to be updated if patient is accepted.  This facility was not one they requested but is closest to [place] where they reside    UPDATE@4200:  MICHAEL was able to reach Flakita @[facility] Team 140 to update her that requested paperwork was faxed to them earlier today regarding patient's Medicaid.  Per Flakita, due to patient's circumstances at this time (pt in need of SNF placement), she stated she will keep patient's MA open in MMIS (which can be viewed outside of the [facility]), but stated [place] (just within [facility]) on 2/1/18.

## 2018-01-30 NOTE — PROGRESS NOTES
[facility]    Hospitalist Progress Note    Assessment & Plan   Joni Muñoz is a 67 year old male with chronic back pain and use of methadone, history of opiate overdose, polysubstance abuse including cocaine, tobacco abuse, depression who was admitted on 1/22/2018 after being found unresponsive by family.  Intubated for airway protection and remained in the intensive care unit through 1/26/18.  Treating for acute hypoxic and hypercarbic respiratory failure, anoxic brain injury, acute encephalopathy, severe sepsis secondary to community acquired versus aspiration pneumonia, new diagnosis cardiomyopathy.     Acute hypoxic and hypercarbic respiratory failure  Severe sepsis secondary to community-acquired pneumonia  Methadone overdose  COPD, probable  Initiated on broad-spectrum antibiotics on admission.  Presented with elevated lactate, leukocytosis and profound endorgan damage.  - Ceftriaxone. Started broad spectrum antibiotics on admission (1/22). Final dose 1/29 to complete 7 day course.   - Wean oxygen as tolerated. Currently on 5-6 L by nasal cannula or mask.  - Transitioning to hospice and comfort care       Acute encephalopathy secondary to anoxic brain injury  Brain imaging reveals probable prior small strokes.  Long-standing history of polysubstance abuse so unclear how much executive functioning will be recoverable.  - Continue supportive care      Dysphasia  Hypernatremia, dehydration  Unable to safely swallow and needs supplemental nutrition. Had an NJ tube while in the intensive care unit which was discontinued after extubation.  - NJ tube placement in radiology 1/27/2018  - Isosource 1.5 at 60 mL/hr with free water flushes      Polysubstance abuse  Chronic low back pain  On chronic methadone. History of overdoses. Urinary tox screen positive for cocaine on admission. Showing some withdrawal signs and symptoms on 1/27/18.  - Continue supportive care with methadone maintenance      New  diagnosis cardiomyopathy  Suspected type II non-ST segment elevation MI  Hypertension  Sinus tachycardia  In the setting of sepsis and unknown period of anoxia prior to admission.  Peak troponin 0 0.6.  TTE on 1/22/18 showed reduced EF of 35-40% with moderate global left ventricular hypokinesis.  - Will not obtain cardiology consult given the goals for hospice now       Stress hyperglycemia  Not known to be diabetic prior to admission  - Continue glucose checks with corrective dose aspart insulin      Acute kidney injury, resolved  Presumed due to hypotension and acute tubular necrosis.  - Monitor trend and avoid nephrotoxic medications      Shock liver, improving  LFTs significantly elevated on admission in the setting of severe sepsis.    DVT Prophylaxis: Pneumatic Compression Devices  Code Status: DNR/DNI    Disposition: Expected discharge to inpatient hospice once able to find a bed.    Nikhil Araya,   Text Page (7am to 6pm)    Interval History   Patient seen and examined.  Not responding to verbal stimuli.  Transitioning to hospice and comfort cares.  Awaiting bed for inpatient hospice.    -Data reviewed today: I reviewed all new labs and imaging results over the last 24 hours. I personally reviewed no images or EKG's today.    Physical Exam   Temp: 98.2  F (36.8  C) Temp src: Axillary BP: (!) 80/40 Pulse: 83 Heart Rate: 83 Resp: 20 SpO2: 90 % O2 Device: Oxymask Oxygen Delivery: 5 LPM  Vitals:    01/25/18 0400 01/26/18 0600 01/30/18 0537   Weight: 72 kg (158 lb 11.7 oz) 67.1 kg (147 lb 14.9 oz) 67.4 kg (148 lb 9.4 oz)     Vital Signs with Ranges  Temp:  [97.9  F (36.6  C)-99.2  F (37.3  C)] 98.2  F (36.8  C)  Pulse:  [83-88] 83  Heart Rate:  [75-83] 83  Resp:  [20-22] 20  BP: ()/(40-97) 80/40  SpO2:  [90 %-95 %] 90 %  I/O last 3 completed shifts:  In: 650 [I.V.:380; NG/GT:270]  Out: 1300 [Urine:1300]    Constitutional: Unresponsive to verbal stimuli   Respiratory: Clear to auscultation bilaterally,  no crackles or wheezing  Cardiovascular: Regular rate and rhythm,  GI: Normal bowel sounds    Medications       DULoxetine  30 mg Per Feeding Tube BID     methadone  60 mg Per Feeding Tube Daily     fluticasone furoate  1 puff Inhalation QPM       Data     Recent Labs  Lab 01/29/18  1012 01/28/18  0956 01/27/18  1712 01/27/18  0840 01/26/18  0445 01/25/18  0435  01/24/18  0612   WBC 11.1*  --  13.6* 13.6*  --  15.8*  --  19.6*   HGB  --   --  15.3 15.2  --  14.3  --  14.3   MCV  --   --  89 87  --  90  --  89   PLT  --  298 267 272  --  214  --  247   INR  --   --   --   --   --   --   --  1.17*   * 150*  --  145* 142  --   < > 141   POTASSIUM 3.8 3.7  --  3.6 3.6 3.7  < > 3.6   CHLORIDE 113* 117*  --  113* 108  --   < > 109   CO2 31 28  --  23 25  --   < > 26   BUN 19 20  --  16 13  --   < > 11   CR 0.60* 0.61*  --  0.61* 0.55* 0.55*  < > 0.53*   ANIONGAP 4 5  --  9 9  --   < > 6   HEATH 7.9* 8.1*  --  8.3* 8.2*  --   < > 8.1*   * 165*  --  105* 100*  --   < > 111*   ALBUMIN  --   --   --  2.5* 2.3*  --   < > 2.3*   PROTTOTAL  --   --   --  6.6* 6.6*  --   < > 6.6*   BILITOTAL  --   --   --  0.7 0.6  --   < > 0.5   ALKPHOS  --   --   --  138 127  --   < > 114   ALT  --   --   --  118* 156*  --   < > 366*   AST  --   --   --  53* 32  --   < > 98*   < > = values in this interval not displayed.    Imaging:   No results found for this or any previous visit (from the past 24 hour(s)).

## 2018-01-30 NOTE — PROGRESS NOTES
[facility]    Palliative Care Progress Note    Joni Muñoz  MRN# [id number]  Date of Admission:  1/22/2018  Date of Service (when I saw the patient): 01/30/2018    Assessment & Plan   Joni Muñoz is a 67 year old male with PMH significant for chronic back pain, polysubstance abuse (cocaine) on methadone maintenance therapy, past unintentional drug overdose, and recent dx PNA/bronchitis/influenza A who presents with AMS and acute hypoxic respiratory failure. He was intubated on admission and being treated for severe sepsis likely 2/2 PNA. He has now been successfully extubated. 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 We are following for goals of care.      Symptoms/Recommendations   -Transition to comfort measures only, stop checking VS, labs, procedures   -DNR/DNI  -Remove NJ and allow pt to eat and drink for pleasure and comfort, accepting aspiration risk. He likes vanilla ice cream and yogurt per family   -Please do not suction. Use drying agents (robinul, atropine) and repositioning to help with secretions   -Morphine 1-2mg SL every 2hrs PRN for pain, SOB, dyspnea, distress   -Haldol 1-2mg SL every 6hrs PRN for agitation   -APAP suppository PRN for mild pain, fever   -Bisacodyl suppository PRN for constipation   -SW consult for hospice. Family looking at family placement. Merna Rider, [place] hospice liaison, will meet with family at 1100 today   -Methadone plan to be further addressed by hospice (he is otherwise on it for drug addiction therapy). Could consider methadone 20mg SL every 8hrs for total of 60mg per day     Support/Coping  -Niece Nataliia and her fiance Raymon are closest with pt. He is otherwise not  and has no children. He has 4 siblings, Sonia is the sister most involved, others are not reachable. One brother Adin visited last week, was offered  to participate in a goals of care meeting, he declined. Nataliia and Sonia are present today   -Pt and family are [employer], requesting to see Father True for last rights      Decisional Support, Goals of Care, Counseling & Coordination  Decisional Capacity Intact?  -No  Health Care Directive on File?  -No. In the absence of a HCD, surrogate decision making defaults to adult sibling, Sonia, per next of kin policy. My assessment thus far is that I am concerned that Sonia may not be able to serve as a surrogate decision maker, given her emotional instability. Highly recommend face to face meetings with her and close involvement of her daughter, pt's sherrie William   Code Status/Resuscitation Preferences?  -Changed to DNR/DNI subsequent to today's meeting      Discussion  Visited with sherrie William and pt's sister Sonia in private today. Sonia is quite frail and chronically ill. She is very tearful and emotional. We talk about how Saurav appears to be declining, despite maximal nursing care this past week. He has not been able to participate in rehabilitation, and a recovery process from here is becoming more and more unlikely. I share with them that I believe Saurav is dying and that is largely out of our control at this point. Sonia was quite upset, blaming Saurav for his past actions and poor health. I reframed for Sonia that this is not Saurav's fault and he was very critically ill from a respiratory infection, which ultimately led to this current state.     Nataliia confirms that she is unable to care for Saurav at home. At first she talked about going to TCU for rehabilitation. I again reinforced that Saurav is not participating in cares in a meaningful way and he is unlikely to have any rehabilitation potential moving forward. I again recommended comfort cares and hospice. Nataliia was receptive to this and Sonia ultimately was as well.     Meeting was then joined by ivet Calles who helped with the conversation about facility placement.  "    I educated family regarding hospice philosophy and prognostic criteria. Dispelled common myths. Discussed what hospice is (and is not), what services are usually provided (and those that are not, ex \"intermediate care\"), under what circumstances people tend to enroll, and the variety of places people can get hospice care (along with subsequent financial implications). Discussed typical anticipated timing of discharge. They received this information well.    Nataliia recommended that if Saurav's heart stops we allow him to die naturally. Sonia agreed. We thus agree to DNR/DNI at this time.     We talk about removing the NJ and allowing Saurav to eat and drink for pleasure. We talk about avoiding suctioning, as this generally does not promote comfort. We talk about how we assess for pain at end of life, including verbal and nonverbal signs of discomfort.     I did prepare family that even as we embark on discharge planning with hospice, it is possible that Saurav could further decompensate in the hospital and even die here. They were surprised to hear this, yet appeared to accept it.     Case reviewed with Merna Rider, hospice liaison, and unit MICHAEL Calles.     Thank you for involving us in the care of this patient and family. We will sign off at this time. Please do not hesitate to contact me with questions or concerns or the on-call provider for our team if evening or weekend.    Vianney ANDERSON, [facility]  Palliative Medicine   Pager [phone]    Attestation:  Total time on the floor involved in the patient's care: 60 minutes  Total time spent in counseling/care coordination: >50%    Interval History   No acute events overnight. Pt remains somnolent and unable to participate in therapy or have meaningful interaction with nursing staff.     Medications   Current Facility-Administered Medications Ordered in Epic   Medication Dose Route Frequency Last Rate Last Dose     ipratropium - albuterol 0.5 mg/2.5 mg/3 mL (DUONEB) neb " solution 3 mL  3 mL Nebulization Q4H PRN         morphine sulfate HIGH CONCENTRATE (ROXANOL CONCENTRATED) 10 mg/0.5 mL (HIGH CONC) solution 5-10 mg  5-10 mg Sublingual Q2H PRN         acetaminophen (TYLENOL) Suppository 650 mg  650 mg Rectal Q6H PRN         OLANZapine zydis (zyPREXA) ODT half-tab 2.5-5 mg  2.5-5 mg Sublingual Q6H PRN         [START ON 2/2/2018] bisacodyl (DULCOLAX) Suppository 10 mg  10 mg Rectal Once PRN         atropine 1 % ophthalmic solution 1-2 drop  1-2 drop Sublingual Q1H PRN         glycopyrrolate (ROBINUL) injection 0.2-0.4 mg  0.2-0.4 mg Intravenous Q4H PRN         haloperidol (HALDOL) 2 MG/ML (HIGH CONC) solution 1-2 mg  1-2 mg Oral Q6H PRN         acetaminophen (TYLENOL) solution 650 mg  650 mg Per Feeding Tube Q6H PRN   650 mg at 01/30/18 0849     DULoxetine (CYMBALTA) EC capsule 30 mg  30 mg Per Feeding Tube BID   30 mg at 01/30/18 0848     methadone (DOLOPHINE-INTENSOL) 10 MG/ML (HIGH CONC) solution 60 mg  60 mg Per Feeding Tube Daily   60 mg at 01/30/18 0849     fluticasone furoate (ARNUITY ELLIPTA) 100 MCG/ACT inhalation powder 1 puff  1 puff Inhalation QPM         albuterol neb solution 2.5 mg  2.5 mg Nebulization Q2H PRN         senna-docusate (SENOKOT-S;PERICOLACE) 8.6-50 MG per tablet 1 tablet  1 tablet Oral BID PRN        Or     senna-docusate (SENOKOT-S;PERICOLACE) 8.6-50 MG per tablet 2 tablet  2 tablet Oral BID PRN         No current Epic-ordered outpatient prescriptions on file.       Physical Exam   Temp: 98.8  F (37.1  C) Temp src: Axillary BP: (!) 173/97 Pulse: 83 Heart Rate: 83 Resp: 20 SpO2: 94 % O2 Device: Oxymask Oxygen Delivery: 5 LPM  Vitals:    01/25/18 0400 01/26/18 0600 01/30/18 0537   Weight: 72 kg (158 lb 11.7 oz) 67.1 kg (147 lb 14.9 oz) 67.4 kg (148 lb 9.4 oz)     CONSTITUTIONAL: Chronically ill elderly man seen sleeping in bed in NAD, did not attempt to wake him up. He appears calm and comfortable.  HEENT: NCAT  RESPIRATORY: NL respiratory effort on  oxymask    Data   Results for orders placed or performed during the hospital encounter of 01/22/18 (from the past 24 hour(s))   Glucose by meter   Result Value Ref Range    Glucose 135 (H) 70 - 99 mg/dL   Glucose by meter   Result Value Ref Range    Glucose 115 (H) 70 - 99 mg/dL   Glucose by meter   Result Value Ref Range    Glucose 139 (H) 70 - 99 mg/dL   Glucose by meter   Result Value Ref Range    Glucose 125 (H) 70 - 99 mg/dL   Glucose by meter   Result Value Ref Range    Glucose 125 (H) 70 - 99 mg/dL   Glucose by meter   Result Value Ref Range    Glucose 137 (H) 70 - 99 mg/dL

## 2018-01-30 NOTE — PROGRESS NOTES
Hospice: Met with patients sister Sonia and her daughter/patients sherrie William to explain the medicare hospice benefit. Both had met with Angela CARRILLO NP from Palliative who did a wonderful job paving the way for hospice care. Neither Nataliia or Sonia had any questions. We discussed the POLST and Sonia deferred all signatures to Nataliia. The POLST is on the front of the chart for hospitalist signature.   We discussed possible places for discharge which Marli WINKLER Yeimi will be looking into. These include [facility], [facility], [facility], [facility], [facility],  and [facility]. Nataliia signed the hospice consent forms and was given her signed copies along with the hospice handbook and our 24 hr number.   Please see palliative care recommendations for comfort meds. The methadone will be a hospice covered medication and would recommend the methadone intensol 10mg/ml.   Please keep me updated to the facility where patient will be accepted as I will need to order oxygen and set up a time for the  Hospice admission team to complete the admit.   Thank you for the referral. Merna Rider RN, BSN   Hospice Admission nurse  [phone]

## 2018-01-31 NOTE — PLAN OF CARE
Problem: Patient Care Overview  Goal: Plan of Care/Patient Progress Review  A&O to self, mostly nonverbal. Neuros include garbled speech. Pt on comfort cares. Regular diet with supervision while feeding. Requires assist x2. T&R q 2 hrs. Incontinent of stool x1. Herman patent. Denies pain. Plan is to transfer pt to LTC. Continue to monitor.

## 2018-01-31 NOTE — PROGRESS NOTES
Michael Progress Note  Chart Reviewed, Pt discussed in Interdisciplinary Rounds.   Pt and family planning to discharge to LTC with hospice services through [facility] services.    Intervention:   MICHAEL spoke with Pavel regarding referral to the [facility]. They have declined pt due to past drug use even though pt is unresponsive, they don't feel they can care for pt adequately. Pavel faxed last SW per request regarding pt's insurance and will be sending referral on to [facility] for consideration.  Pt's MA is due to close after today and re-application was sent in yesterday.    Plan: LTC placement with hospice services.    Barriers: LTC bed needed. Insurance lapsing  Follow up plan: SW will continue following for placement.  DINAH Hernandez  [facility] Care Transitions  Phone: [phone]    ADDENDUM: Pt has been accepted to [facility] today for admission per Pavel. MICHAEL called and updated pt's sister, Sonia, and she is in agreement with placement. She indicates that she is not able to come to hospsital today and would like pt transported by stretcher to [facility].  Hospitalist notified of placement. Orders for discharge entered into epic.  Discharge order faxed to [facility].  Merna with  Hospice notified of discharge plan.  PCS form completed, faxed and placed on chart.  Medication orders will be devoid of ranges and sent to be filled at hospital pharmacy. MICHAEL called Pavel to verify that liquid meds are acceptable and they are; discharge pharmacy updated.    CC, RN,[facility] all updated.    PAS-RR    D: Per DHS regulation, MICHAEL completed and submitted PAS-RR to [place] [employer] on [employer] Direct Connect via the Senior LinkAge Line.  PAS-RR confirmation # is : [id number].  P: Further questions may be directed to Senior LinkAge Line at #[phone], option #4 for PAS-RR staff.    Addendum: Care Transitions Assistant spoke with pt's sister (Sonia) regarding medicare rights and she states she is going to call her daughter  (Nataliia) to have her appeal pt's discharge. Both Sonia and Nataliia are ill at home. CTA asked that they contact the hospital staff and notify them if they plan to appeal discharge.

## 2018-01-31 NOTE — PLAN OF CARE
Problem: Patient Care Overview  Goal: Plan of Care/Patient Progress Review  Outcome: Adequate for Discharge Date Met: 01/31/18  Alert, slurred, garbled speech, occasionally clear speech. On comfort cares. Herman catheter in place. Morphine given fr pain in legs. Patient transferred to hospice facility this afternoon, transport provided by EMS. IV removed before transport. Patient took his belongings with him.

## 2018-01-31 NOTE — PLAN OF CARE
Problem: Patient Care Overview  Goal: Plan of Care/Patient Progress Review  Outcome: No Change  Alert this shift and conversing.  Knows birthday.  Coughs with all po intake. Poor appetite, likes ice cream. Herman intact, smear of BM this shift.  Skin intact except dry spot left lateral ankle.  Call placed to MD to adjust discharge meds without ranges.  Plan for discharge to hospice at 1730.

## 2018-01-31 NOTE — DISCHARGE SUMMARY
[facility]    Discharge Summary  Hospitalist    Date of Admission:  1/22/2018  Date of Discharge:  1/31/2018  Discharging Provider: Nikhil Araya DO    Discharge Diagnoses   1. Acute hypoxic & hypercarbic respiratory failure  2. Severe sepsis 2/2 CAP  3. Suspected Methadone overdose   4. Probably COPD   5. Acute encephalopathy 2/2 anoxic brain injury  6. Dysphasia  7. Hypernatremia 2/2 dehydration   8. H/o polysubstance abuse  9. Elevated troponin, suspect demand ischemia  10. Newly diagnosed Cardiomyopathy with reduced EF  11. HTN   12. ANTONI   13. Hyperglycemia  14. Shock liver  15. End of life planning with election to go to inpatient hospice     History of Present Illness   Joni Muñoz is an 67 year old male with a history of substance abuse (methadone- rx and cocaine recreationally) who presented to the emergency department via EMS for evaluation of altered mental status and hypoxia. The patient was seen in clinic on January 13th and was diagnosed with bronchitis, with followup scheduled in 4 weeks with reported exposure to flu and rx for tamiflu and zpak. Per EMS, the patient was checked on by family the day prior and found to be minimally responsive, so they assumed he was sleeping.  When checked on by family again this morning, he was still minimally responsive so they became concerned and contacted 911. En route, his sats were in the 70s, blood sugar at 96, and sounded congested with wheezing. He was unresponsive with minimal moans to pain. At arrival, he remained unresponsive and was put on 10 L/min of oxygen.    Hospital Course   Joni Muñoz was admitted on 1/22/2018.  The following problems were addressed during his hospitalization:    Patient was initially admitted for hypoxic and hypercarbic respiratory failure thought to be related to a methadone overdose as well as severe sepsis secondary to CAP.  He was managed as below but it was clear the patient was not improving and  imaging was suggestive of anoxic brain injury.  After discussion with the patient's family the decision was made to move the patient to inpatient hospice and this was arranged for him.      Acute hypoxic and hypercarbic respiratory failure  Severe sepsis secondary to community-acquired pneumonia  Methadone overdose  COPD, probable  Initiated on broad-spectrum antibiotics on admission.  Presented with elevated lactate, leukocytosis and profound endorgan damage.  - Ceftriaxone. Started broad spectrum antibiotics on admission (1/22). Final dose 1/29 to complete 7 day course.   - Wean oxygen as tolerated. Currently on 5-6 L by nasal cannula or mask.  - Transitioning to hospice and comfort care       Acute encephalopathy secondary to anoxic brain injury  Brain imaging reveals probable prior small strokes.  Long-standing history of polysubstance abuse so unclear how much executive functioning will be recoverable.  - Continue supportive care      Dysphasia  Hypernatremia, dehydration  Unable to safely swallow and needs supplemental nutrition. Had an NJ tube while in the intensive care unit which was discontinued after extubation.  - NJ tube placement in radiology 1/27/2018  - Isosource 1.5 at 60 mL/hr with free water flushes      Polysubstance abuse  Chronic low back pain  On chronic methadone. History of overdoses. Urinary tox screen positive for cocaine on admission. Showing some withdrawal signs and symptoms on 1/27/18.  - Continue supportive care with methadone maintenance      New diagnosis cardiomyopathy  Suspected type II non-ST segment elevation MI  Hypertension  Sinus tachycardia  In the setting of sepsis and unknown period of anoxia prior to admission.  Peak troponin 0 0.6.  TTE on 1/22/18 showed reduced EF of 35-40% with moderate global left ventricular hypokinesis.  - Will not obtain cardiology consult given the goals for hospice now       Stress hyperglycemia  Not known to be diabetic prior to admission  -  Continue glucose checks with corrective dose aspart insulin      Acute kidney injury, resolved  Presumed due to hypotension and acute tubular necrosis.  - Monitor trend and avoid nephrotoxic medications      Shock liver, improving  LFTs significantly elevated on admission in the setting of severe sepsis.    Nikhil Araya, DO    Significant Results and Procedures   See below     Pending Results   No pending results  Unresulted Labs Ordered in the Past 30 Days of this Admission     No orders found from 11/23/2017 to 1/23/2018.          Code Status   DNR / DNI       Primary Care Physician   Kodi Gonzalez    Physical Exam            Resp: 18 SpO2: 93 % O2 Device: Oxymask Oxygen Delivery: 4 LPM  Vitals:    01/25/18 0400 01/26/18 0600 01/30/18 0537   Weight: 72 kg (158 lb 11.7 oz) 67.1 kg (147 lb 14.9 oz) 67.4 kg (148 lb 9.4 oz)     Vital Signs with Ranges  Resp:  [18-20] 18  SpO2:  [93 %-95 %] 93 %  I/O last 3 completed shifts:  In: 3177 [I.V.:1179; NG/GT:451]  Out: 1475 [Urine:1475]    Constitutional: Resting comfortably.  Will answer some questions with 1 word answers.  Has difficulty following simple commands  Eyes: Unable to assess extraocular movements but will look towards voice  Respiratory: Clear to auscultation.  No respiratory distress  Cardiovascular: RRR.  No murmurs   GI: Bowel sounds present  Musculoskeletal: Trace lower extremity edema   Neuropsychiatric: Alert and oriented to person but no place.  Knows the year is 2018 but does not know month or date    Discharge Disposition   Discharged to inpatient hospice  Condition at discharge: Stable    Consultations This Hospital Stay   PHARMACY TO DOSE [facility]  PHYSICAL THERAPY ADULT IP CONSULT  OCCUPATIONAL THERAPY ADULT IP CONSULT  NEUROLOGY IP CONSULT  NUTRITION SERVICES ADULT IP CONSULT  PHARMACY IP CONSULT  PALLIATIVE CARE ADULT IP CONSULT  PALLIATIVE CARE ADULT IP CONSULT  SPEECH LANGUAGE PATH ADULT IP CONSULT  SWALLOW EVAL SPEECH PATH AT BEDSIDE  IP CONSULT  SOCIAL WORK IP CONSULT  SPIRITUAL HEALTH SERVICES IP CONSULT    Time Spent on this Encounter   I, Nikhil Araya, personally saw the patient today and spent greater than 30 minutes discharging this patient.    Discharge Orders     General info for SNF   Length of Stay Estimate: Long Term Care  Condition at Discharge: Terminal  Level of care:skilled   Rehabilitation Potential: Poor  Admission H&P remains valid and up-to-date: Yes  Recent Chemotherapy: N/A  Use Nursing Home Standing Orders: Yes     Reason for your hospital stay   Acute hypoxic respiratory failure     Activity - Up ad lety     DNR/DNI       Discharge Medications   Current Discharge Medication List      START taking these medications    Details   morphine sulfate HIGH CONCENTRATE (ROXANOL CONCENTRATED) 10 mg/0.5 mL (HIGH CONC) solution Place 0.05-0.1 mLs (1-2 mg) under the tongue every 2 hours as needed for shortness of breath / dyspnea, moderate to severe pain or other (or dyspnea)  Qty: 30 mL, Refills: 0    Associated Diagnoses: Acute respiratory failure with hypoxia (H)      acetaminophen (TYLENOL) 32 mg/mL solution 20.3 mLs (650 mg) by Per Feeding Tube route every 6 hours as needed for mild pain or fever  Qty: 300 mL    Associated Diagnoses: Acute respiratory failure with hypoxia (H)      acetaminophen (TYLENOL) 650 MG Suppository Place 1 suppository (650 mg) rectally every 6 hours as needed for mild pain or fever (temperature over 100  F )  Qty: 60 suppository    Associated Diagnoses: Acute respiratory failure with hypoxia (H)      haloperidol (HALDOL) 2 MG/ML (HIGH CONC) solution Take 0.5-1 mLs (1-2 mg) by mouth every 6 hours as needed for agitation or other (restlessness, nausea, vomiting)  Qty: 60 mL    Associated Diagnoses: Acute respiratory failure with hypoxia (H)      bisacodyl (DULCOLAX) 10 MG Suppository Place 1 suppository (10 mg) rectally daily as needed for other (for no bowel movement for 72 hours)  Qty: 30 suppository     Associated Diagnoses: Acute respiratory failure with hypoxia (H)         CONTINUE these medications which have CHANGED    Details   methadone (DOLOPHINE-INTENSOL) 10 MG/ML (HIGH CONC) solution 6 mLs (60 mg) by Per Feeding Tube route daily  Qty: 30 mL, Refills: 0    Associated Diagnoses: Acute respiratory failure with hypoxia (H)         CONTINUE these medications which have NOT CHANGED    Details   albuterol (PROAIR HFA) 108 (90 BASE) MCG/ACT Inhaler Inhale 2 puffs into the lungs every 6 hours  Qty: 1 Inhaler, Refills: 5    Associated Diagnoses: Wheezing      beclomethasone (QVAR) 80 MCG/ACT Inhaler Inhale 2 puffs into the lungs 2 times daily  Qty: 1 Inhaler, Refills: 11    Associated Diagnoses: Cough         STOP taking these medications       azithromycin (ZITHROMAX) 500 MG tablet Comments:   Reason for Stopping:         fluticasone (GNP FLUTICASONE PROPIONATE) 50 MCG/ACT spray Comments:   Reason for Stopping:         oseltamivir (TAMIFLU) 75 MG capsule Comments:   Reason for Stopping:         gabapentin (NEURONTIN) 600 MG tablet Comments:   Reason for Stopping:         cyclobenzaprine (FLEXERIL) 10 MG tablet Comments:   Reason for Stopping:         varenicline (CHANTIX STARTING MONTH PAK) 0.5 MG X 11 & 1 MG X 42 tablet Comments:   Reason for Stopping:         zolpidem (AMBIEN) 5 MG tablet Comments:   Reason for Stopping:         DULoxetine (CYMBALTA) 30 MG EC capsule Comments:   Reason for Stopping:             Allergies   Allergies   Allergen Reactions     Acetaminophen Nausea     Data   Most Recent 3 CBC's:  Recent Labs   Lab Test  01/29/18   1012  01/28/18   0956  01/27/18   1712  01/27/18   0840  01/25/18   0435   WBC  11.1*   --   13.6*  13.6*  15.8*   HGB   --    --   15.3  15.2  14.3   MCV   --    --   89  87  90   PLT   --   298  267  272  214      Most Recent 3 BMP's:  Recent Labs   Lab Test  01/29/18   1012  01/28/18   0956  01/27/18   0840   NA  148*  150*  145*   POTASSIUM  3.8  3.7  3.6   CHLORIDE   113*  117*  113*   CO2  31  28  23   BUN  19  20  16   CR  0.60*  0.61*  0.61*   ANIONGAP  4  5  9   HEATH  7.9*  8.1*  8.3*   GLC  131*  165*  105*     Most Recent 2 LFT's:  Recent Labs   Lab Test  01/27/18   0840  01/26/18   0445   AST  53*  32   ALT  118*  156*   ALKPHOS  138  127   BILITOTAL  0.7  0.6     Most Recent INR's and Anticoagulation Dosing History:  Anticoagulation Dose History     Recent Dosing and Labs Latest Ref Rng & Units 1/22/2018 1/23/2018 1/24/2018    INR 0.86 - 1.14 1.08 1.42(H) 1.17(H)        Most Recent 3 Troponin's:  Recent Labs   Lab Test  01/23/18   0610  01/22/18   1310  05/17/16   0924   TROPI  0.208*  0.640*  <0.015  The 99th percentile for upper reference range is 0.045 ug/L.  Troponin values in   the range of 0.045 - 0.120 ug/L may be associated with risks of adverse   clinical events.       Most Recent Cholesterol Panel:  Recent Labs   Lab Test  04/16/13   0847   CHOL  213*   LDL  144*   HDL  53   TRIG  80     Most Recent 6 Bacteria Isolates From Any Culture (See EPIC Reports for Culture Details):  Recent Labs   Lab Test  01/22/18   1310  01/22/18   1255  01/22/18   1015  01/22/18   0952  01/22/18   0910  01/22/18   0848   CULT  No growth  No growth  No growth  No growth  Moderate growth  Normal rivka    Heavy growth  Moraxella (Branhamella) catarrhalis  Beta lactamase positive  *  No growth     Most Recent TSH, T4 and A1c Labs:  Recent Labs   Lab Test  01/22/18   1310   TSH  0.72   A1C  5.6     Results for orders placed or performed during the hospital encounter of 01/22/18   Head CT w/o contrast    Narrative    CT SCAN OF THE HEAD WITHOUT CONTRAST   1/22/2018 9:35 AM     HISTORY: Altered mental status.    TECHNIQUE:  Axial images of the head and coronal reformations without  IV contrast material.  Radiation dose for this scan was reduced using  automated exposure control, adjustment of the mA and/or kV according  to patient size, or iterative reconstruction  technique.    COMPARISON: None.    FINDINGS: There is an old infarct in the right basal ganglia region  and a small cystic area in the genu of the corpus callosum which is  also probably an old infarct. There is also a tiny old right anterior  thalamic lacunar infarct. Mild cerebral atrophy and some minimal  patchy white matter changes are present in both hemispheres. There is  no evidence for intracranial hemorrhage, mass effect, acute infarct,  or skull fracture. Extensive paranasal sinus disease is seen with  air-fluid levels. Some vascular calcifications also noted.      Impression    IMPRESSION:  1. Chronic intraparenchymal brain changes. No evidence for any acute  intracranial process.  2. Extensive paranasal sinus disease with fluid levels. An acute  sinusitis could be present. Clinical correlation suggested.    EMERITA MOORE MD   XR Chest Port 1 View    Narrative    XR CHEST PORT 1 VW 1/22/2018 8:57 AM     HISTORY: decreasd loc;       Impression    IMPRESSION: Linear atelectasis or fibrosis at the left lung base.  Additional mild hazy opacity of the left lung base posterior to the  heart could represent pneumonia or aspiration.    LULÚ KAISER MD   XR Chest Port 1 View    Narrative    XR CHEST PORT 1 VW 1/22/2018 9:18 AM    HISTORY: Decreased level of consciousness.    COMPARISON: January 22, 2018.      Impression    IMPRESSION: An endotracheal tube terminates 8.7 cm above the joyce.  There is patchy opacification of the left lung base, worsened compared  to prior exam. No pneumothorax.    BRANNON MONTIEL MD   Chest  XR, 1 view PORTABLE    Narrative    XR CHEST PORT 1 VW 1/22/2018 10:01 AM    HISTORY: Endotracheal tube placement.    COMPARISON: January 22, 2018.      Impression    IMPRESSION: The endotracheal tube terminates 6.5 cm from the joyce.  Nasogastric tube extends into the stomach beyond the lower margin the  study. Patchy opacification of left lung base as before. No  pneumothorax.    BRANNON  MD TIANA   MR Brain w/o & w Contrast    Narrative    MRI BRAIN WITHOUT AND WITH CONTRAST  1/23/2018 5:28 PM    HISTORY: Acute encephalopathy, concern for anoxic brain injury,  sustained clonus on exam.     TECHNIQUE: Multiplanar, multisequence MRI of the brain without and  with 8mL Gadavist.    COMPARISON: Head CT 1/22/2018.    FINDINGS: Areas of restricted diffusion in the deep periventricular  white matter left greater than right in the region of the body of the  lateral ventricles. There is also restricted diffusion in the white  matter surrounding the occipital horns of the lateral ventricles, the  posterior right centrum semiovale, as well as the right parietal lobe  cortex. The areas of restricted diffusion are also associated with T2  hyperintensity.    No evidence of acute intracranial hemorrhage. No mass effect or  midline shift. Chronic lacunar infarct in the right caudate head with  hemosiderin staining suggesting that it may have been a hemorrhagic  infarct. Ventricular size within normal limits without evidence of  hydrocephalus. The basal cisterns remain patent,     No abnormal intracranial enhancement.    ET tube and enteric tube are present. There is near complete  opacification of the paranasal sinuses with an air-fluid layer in the  maxillary sinuses. This is not unexpected in the setting of  intubation.      Impression    IMPRESSION:    1. Areas of ischemia in the deep periventricular white matter and to a  lesser extent the right centrum semiovale and right parietal lobe  cortex. Given the distribution of infarct, this could be hypoxic  ischemic encephalopathy. No evidence of hemorrhagic transformation of  infarct. No significant mass effect or midline shift. No  hydrocephalus.  2. Chronic lacunar infarct in the right caudate head which may have  been hemorrhagic.  3. Extensive sinus mucosal thickening with air-fluid layers. This is  not unexpected in the setting of intubation.      AILYN ESPINAL,  MD   MR Cervical Spine w/o & w Contrast    Narrative    MRI CERVICAL SPINE WITHOUT AND WITH CONTRAST  1/23/2018  5:19 PM     HISTORY: Three plus hyperreflexia throughout upper and lower  extremities, and sustained ankle clonus.     TECHNIQUE: Multiplanar, multisequence MRI of the cervical spine  without and with 8mL Gadavist.    COMPARISON: None.    FINDINGS: No abnormal signal within the visualized spinal cord. No  abnormal intramedullary or leptomeningeal enhancement of the spinal  cord.    Normal cervical lordosis. Anterior posterior alignment of the spine is  within normal limits. Vertebral body height is maintained without  evidence of fracture. There are no destructive osseous lesions. Marked  loss of intervertebral disc space with disc desiccation at all levels  in the cervical spine. There are Modic-type degenerative endplate  changes at C7-T1 with associated STIR hyperintense marrow edema.    The visualized portions of the brainstem and cerebellum are  unremarkable.    Level by level as follows:    C2-C3: Small posterior disc bulge and bilateral facet hypertrophy  without significant spinal canal or neural foraminal narrowing.     C3-C4: Posterior disc bulge and endplate osteophytic spurring  asymmetric on the right abuts the ventral right spinal cord. Bilateral  uncinate spurring and facet hypertrophy results in mild bilateral  neural foraminal narrowing.     C4-C5: Posterior disc bulge and endplate osteophytic spurring causes  mild spinal canal narrowing. Bilateral uncinate spurring and facet  hypertrophy results in moderate right and severe left neural foraminal  narrowing.     C5-C6: Posterior disc bulge and endplate osteophytic spurring without  significant spinal canal narrowing. Right greater than left uncinate  spurring and facet hypertrophy results in severe right and moderate  left neural foraminal narrowing.     C6-C7: Posterior disc bulge without significant spinal canal  narrowing. Bilateral  uncinate spurring and facet hypertrophy results  in mild bilateral neural foraminal narrowing.     C7-T1: Posterior disc bulge and endplate osteophytic spurring without  spinal canal narrowing. Right greater than left uncinate spurring and  facet hypertrophy results in moderate to severe right and mild left  neural foraminal narrowing.     ET tube and enteric tube partially visualized. Visualized sinuses  appear opacified.      Impression    IMPRESSION:   1. No abnormal signal or enhancement of the cervical spinal cord. No  high-grade spinal canal narrowing or evidence of spinal cord edema.  2. Multilevel degenerative changes in the cervical spine as described  above.    AILYN ESPINAL MD   XR Chest Port 1 View    Narrative    XR CHEST PORT 1 VW  1/27/2018 4:12 AM     INDICATION: Shortness of breath.    COMPARISON: 1/22/2018 at 0956 hours.      Impression    IMPRESSION: Mild atelectasis or infiltrate at the left base behind the  heart. Right lung is clear. Stable heart size near the upper limits of  normal. Endotracheal and enteric tubes have been removed.    KELLEY LEMOS MD   XR Feeding Tube Placement    Narrative    FEEDING TUBE PLACEMENT 1/27/2018 12:16 PM    HISTORY: Nutritional needs.    COMPARISON: None.    FINDINGS: 2.4 minutes of fluoroscopy were utilized for placement of a  feeding tube.  At the final position, the feeding tube tip was located  in the third portion of the duodenum.  8 mL of Omnipaque 240 contrast  was injected to confirm placement.  There were no complications of the  procedure.    SPOT IMAGES OR CINE RUNS: 2      Impression    IMPRESSION: Successful feeding tube placement.    AILYN ESPINAL MD

## 2018-01-31 NOTE — PROVIDER NOTIFICATION
"Call placed to Dr. Araya: \"Please adjust discharge Haldol and Morphine orders so that it is not a range (1mg or 2mg only) hospice can't do ranges, thanks!\"  "

## 2018-01-31 NOTE — PLAN OF CARE
Problem: Patient Care Overview  Goal: Plan of Care/Patient Progress Review  Outcome: No Change  Acute encephalopathy. Made comfort care at 1100, alert at time to self, mostly nonverbal at times, garbled speech. Oxymask at 4L. T&R q2hrs,A2, repo sheet. Freq oral care.  TF d/ed, NG tube pulled, kept IV access for IV Robinal given x1 to manage secretions. More alert this evening, patient tolerated applesauce well at 1930, no episodes of coughing. Herman patent, adequate output. Incont of sm smear BM today. Plans to d/c to LTC,  [facility] will be following patient to facility. Palliative and hospice met with family today. Nrsg will continue to monitor

## 2018-01-31 NOTE — PROGRESS NOTES
Pt is now comfort care, TF d/c'd and feeding tube pulled yesterday.  Will sign off. Please reconsult if our services are needed.    Vanna Phillips RD  Pager [phone] (M-F)            [phone] (W/E & Hol)